# Patient Record
Sex: FEMALE | Race: WHITE | NOT HISPANIC OR LATINO | Employment: UNEMPLOYED | URBAN - METROPOLITAN AREA
[De-identification: names, ages, dates, MRNs, and addresses within clinical notes are randomized per-mention and may not be internally consistent; named-entity substitution may affect disease eponyms.]

---

## 2017-10-25 ENCOUNTER — TELEPHONE (OUTPATIENT)
Dept: FAMILY MEDICINE | Facility: CLINIC | Age: 62
End: 2017-10-25

## 2017-10-25 DIAGNOSIS — Z00.00 ROUTINE GENERAL MEDICAL EXAMINATION AT A HEALTH CARE FACILITY: Primary | ICD-10-CM

## 2017-10-25 NOTE — TELEPHONE ENCOUNTER
----- Message from Marya Torre sent at 10/25/2017  1:29 PM CDT -----  Contact: fyi  Doctor appointment and lab have been scheduled.  Please link lab orders to the lab appointment.  Date of doctor appointment:  12/16/18  Physical or EP:  epp  Date of lab appointment:  12/12/18  Comments:

## 2018-03-26 ENCOUNTER — TELEPHONE (OUTPATIENT)
Dept: FAMILY MEDICINE | Facility: CLINIC | Age: 63
End: 2018-03-26

## 2018-03-26 DIAGNOSIS — Z00.00 ROUTINE GENERAL MEDICAL EXAMINATION AT A HEALTH CARE FACILITY: Primary | ICD-10-CM

## 2018-03-26 NOTE — TELEPHONE ENCOUNTER
----- Message from Jinny Light sent at 3/26/2018  1:55 PM CDT -----  Contact: SELF/  Doctor appointment and lab have been scheduled.  Please link lab orders to the lab appointment.  Date of doctor appointment:  4/18/18  Physical or EP:  PHYS  Date of lab appointment:  4/11/18  Comments:

## 2018-04-11 ENCOUNTER — LAB VISIT (OUTPATIENT)
Dept: LAB | Facility: HOSPITAL | Age: 63
End: 2018-04-11
Payer: COMMERCIAL

## 2018-04-11 DIAGNOSIS — Z00.00 ROUTINE GENERAL MEDICAL EXAMINATION AT A HEALTH CARE FACILITY: ICD-10-CM

## 2018-04-11 LAB
ALBUMIN SERPL BCP-MCNC: 3.9 G/DL
ALP SERPL-CCNC: 80 U/L
ALT SERPL W/O P-5'-P-CCNC: 19 U/L
ANION GAP SERPL CALC-SCNC: 6 MMOL/L
AST SERPL-CCNC: 17 U/L
BASOPHILS # BLD AUTO: 0.06 K/UL
BASOPHILS NFR BLD: 0.9 %
BILIRUB SERPL-MCNC: 0.5 MG/DL
BUN SERPL-MCNC: 11 MG/DL
CALCIUM SERPL-MCNC: 9.1 MG/DL
CHLORIDE SERPL-SCNC: 106 MMOL/L
CHOLEST SERPL-MCNC: 197 MG/DL
CHOLEST/HDLC SERPL: 2.9 {RATIO}
CO2 SERPL-SCNC: 30 MMOL/L
CREAT SERPL-MCNC: 0.8 MG/DL
DIFFERENTIAL METHOD: NORMAL
EOSINOPHIL # BLD AUTO: 0.2 K/UL
EOSINOPHIL NFR BLD: 3 %
ERYTHROCYTE [DISTWIDTH] IN BLOOD BY AUTOMATED COUNT: 13.1 %
EST. GFR  (AFRICAN AMERICAN): >60 ML/MIN/1.73 M^2
EST. GFR  (NON AFRICAN AMERICAN): >60 ML/MIN/1.73 M^2
GLUCOSE SERPL-MCNC: 105 MG/DL
HCT VFR BLD AUTO: 42.7 %
HDLC SERPL-MCNC: 67 MG/DL
HDLC SERPL: 34 %
HGB BLD-MCNC: 13.9 G/DL
LDLC SERPL CALC-MCNC: 107 MG/DL
LYMPHOCYTES # BLD AUTO: 2.8 K/UL
LYMPHOCYTES NFR BLD: 40 %
MCH RBC QN AUTO: 28.1 PG
MCHC RBC AUTO-ENTMCNC: 32.6 G/DL
MCV RBC AUTO: 86 FL
MONOCYTES # BLD AUTO: 0.5 K/UL
MONOCYTES NFR BLD: 7.4 %
NEUTROPHILS # BLD AUTO: 3.4 K/UL
NEUTROPHILS NFR BLD: 48.6 %
NONHDLC SERPL-MCNC: 130 MG/DL
PLATELET # BLD AUTO: 288 K/UL
PMV BLD AUTO: 10.4 FL
POTASSIUM SERPL-SCNC: 4.5 MMOL/L
PROT SERPL-MCNC: 6.6 G/DL
RBC # BLD AUTO: 4.95 M/UL
SODIUM SERPL-SCNC: 142 MMOL/L
TRIGL SERPL-MCNC: 115 MG/DL
WBC # BLD AUTO: 6.92 K/UL

## 2018-04-11 PROCEDURE — 80053 COMPREHEN METABOLIC PANEL: CPT

## 2018-04-11 PROCEDURE — 85025 COMPLETE CBC W/AUTO DIFF WBC: CPT

## 2018-04-11 PROCEDURE — 80061 LIPID PANEL: CPT

## 2018-04-11 PROCEDURE — 36415 COLL VENOUS BLD VENIPUNCTURE: CPT

## 2018-04-18 ENCOUNTER — OFFICE VISIT (OUTPATIENT)
Dept: FAMILY MEDICINE | Facility: CLINIC | Age: 63
End: 2018-04-18
Payer: COMMERCIAL

## 2018-04-18 VITALS
TEMPERATURE: 98 F | WEIGHT: 142.63 LBS | RESPIRATION RATE: 16 BRPM | BODY MASS INDEX: 23.76 KG/M2 | DIASTOLIC BLOOD PRESSURE: 80 MMHG | SYSTOLIC BLOOD PRESSURE: 118 MMHG | HEIGHT: 65 IN

## 2018-04-18 DIAGNOSIS — N95.0 POSTMENOPAUSAL VAGINAL BLEEDING: ICD-10-CM

## 2018-04-18 DIAGNOSIS — L71.9 ROSACEA: ICD-10-CM

## 2018-04-18 DIAGNOSIS — B00.1 FEVER BLISTER: ICD-10-CM

## 2018-04-18 DIAGNOSIS — G89.29 CHRONIC LEFT SHOULDER PAIN: ICD-10-CM

## 2018-04-18 DIAGNOSIS — Z00.00 ROUTINE GENERAL MEDICAL EXAMINATION AT A HEALTH CARE FACILITY: Primary | ICD-10-CM

## 2018-04-18 DIAGNOSIS — M47.819 SERONEGATIVE SPONDYLOARTHROPATHY: ICD-10-CM

## 2018-04-18 DIAGNOSIS — M25.512 CHRONIC LEFT SHOULDER PAIN: ICD-10-CM

## 2018-04-18 PROCEDURE — 99999 PR PBB SHADOW E&M-EST. PATIENT-LVL III: CPT | Mod: PBBFAC,,, | Performed by: FAMILY MEDICINE

## 2018-04-18 PROCEDURE — 99396 PREV VISIT EST AGE 40-64: CPT | Mod: S$GLB,,, | Performed by: FAMILY MEDICINE

## 2018-04-18 RX ORDER — PROGESTERONE 100 MG/1
100 CAPSULE ORAL DAILY
Refills: 0 | COMMUNITY
Start: 2018-01-30 | End: 2019-10-30 | Stop reason: DRUGHIGH

## 2018-04-18 RX ORDER — VALACYCLOVIR HYDROCHLORIDE 1 G/1
TABLET, FILM COATED ORAL
Qty: 4 TABLET | Refills: 3 | Status: SHIPPED | OUTPATIENT
Start: 2018-04-18 | End: 2019-04-25 | Stop reason: SDUPTHER

## 2018-04-18 RX ORDER — FLUTICASONE PROPIONATE 50 MCG
SPRAY, SUSPENSION (ML) NASAL
Refills: 0 | COMMUNITY
Start: 2018-03-13 | End: 2022-04-05 | Stop reason: SINTOL

## 2018-04-18 RX ORDER — METRONIDAZOLE 7.5 MG/G
CREAM TOPICAL 2 TIMES DAILY
COMMUNITY
End: 2021-03-22

## 2018-04-18 NOTE — PATIENT INSTRUCTIONS
Follow with GYN    She follows with     RECOMMENDATIONS FOR FEMALES    Prevent the #1 cause of death- cardiovascular disease (HEART ATTACK & STROKE) by checking for normal blood pressure, cholesterol, sugars, & by not smoking.     VACCINES: Yearly FLU shot, ONE PNEUMONIA shot after 65,  SHINGLES shot after 60    Screening colonoscopy at AGE  50 & every 10 years to check for COLON CANCER,  one of the most common & preventable cancers. Or FIT z12.11, Repeat in 3 years if POLYP found     I recommend  high fiber (5 fresh fruits or vegetables daily), low fat diet and aerobic  exercise (huffing/ puffing/ sweating for 20 min straight at least 4 days a week)    Follow up yearly with fasting lipids, CMP, CBC, TSH prior.   ==============================================================

## 2018-04-18 NOTE — PROGRESS NOTES
Subjective:      Patient ID: Caitlyn Grace is a 62 y.o. female.    Chief Complaint: Annual Exam    Here today for general exam.     She had cholestectomy & appendectomy in  Dec 2017, feeling much better    She follows with nurse practitioner nutritionist Kadi Andersen on the [place], receives labs every 6 months treated with vitamins and supplements.  She states that she feels great    She gets occasional fever blisters and requesting refill of acyclovir    She was treated with Humira for ankylosing spondylitis in the past, but she feels well controlled with minimal lower back pain with treatment by chiropractor, nutritionist    Denies any chest pain, shortness of breath, nausea vomiting constipation diarrhea, blood in stool, heartburn    The 10-year ASCVD risk score (Ravindra BURROWS Jr., et al., 2013) is: 2.4%    Values used to calculate the score:      Age: 62 years      Sex: Female      Is Non- : No      Diabetic: No      Tobacco smoker: No      Systolic Blood Pressure: 118 mmHg      Is BP treated: No      HDL Cholesterol: 67 mg/dL      Total Cholesterol: 197 mg/dL    No results found for: HGBA1C  No results found for: MICALBCREAT  Lab Results   Component Value Date    LDLCALC 107.0 04/11/2018    LDLCALC 123.0 04/08/2014    CHOL 197 04/11/2018    HDL 67 04/11/2018    TRIG 115 04/11/2018       Lab Results   Component Value Date     04/11/2018    K 4.5 04/11/2018     04/11/2018    CO2 30 (H) 04/11/2018     04/11/2018    BUN 11 04/11/2018    CREATININE 0.8 04/11/2018    CALCIUM 9.1 04/11/2018    PROT 6.6 04/11/2018    ALBUMIN 3.9 04/11/2018    BILITOT 0.5 04/11/2018    ALKPHOS 80 04/11/2018    AST 17 04/11/2018    ALT 19 04/11/2018    ANIONGAP 6 (L) 04/11/2018    ESTGFRAFRICA >60 04/11/2018    EGFRNONAA >60 04/11/2018    WBC 6.92 04/11/2018    HGB 13.9 04/11/2018    HCT 42.7 04/11/2018    MCV 86 04/11/2018     04/11/2018    TSH 1.816 05/05/2016         Current  "Outpatient Prescriptions on File Prior to Visit   Medication Sig    acyclovir (ZOVIRAX) 800 MG Tab     MINIVELLE 0.05 mg/24 hr 1 patch twice a week.     Past Medical History:   Diagnosis Date    Abnormal ECG 2016: Anterior T wave inversion. Normal stress echo  Dr Grossman    Allergic rhinitis     ENT DR Meade, flonase bid    Chronic left shoulder pain 2018    Fever blister 2018    Gallbladder sludge 2014    [facility]    Gastric polyps     EGD  Dr Brian    Postmenopausal vaginal bleeding 2018    Endometrial biopsy & tx by GYN Marv    Rosacea 2018    Topical metronidazole    Seronegative spondyloarthropathy 2016    10/2015: Diagnosed.     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 She appears well-developed and well-nourished.   HENT:   Head: Normocephalic and atraumatic.   Right Ear: External ear normal.   Left Ear: External ear normal.   Nose: Nose normal.   Mouth/Throat: Oropharynx is clear and moist.   Eyes: EOM are normal. Pupils are equal, round, and reactive to light.   Neck: Neck supple. No " thyromegaly present.   Cardiovascular: Normal rate, regular rhythm, normal heart sounds and intact distal pulses.    No murmur heard.  Pulmonary/Chest: Effort normal and breath sounds normal. She has no wheezes.   Abdominal: Soft. Bowel sounds are normal. She exhibits no distension and no mass. There is no tenderness. There is no rebound and no guarding.   Musculoskeletal: She exhibits no edema.   Lymphadenopathy:     She has no cervical adenopathy.   Neurological: She is alert and oriented to person, place, and time.   Skin: Skin is warm and dry.   Psychiatric: She has a normal mood and affect. Her behavior is normal.     Assessment:     1. Routine general medical examination at a health care facility    2. Rosacea    3. Seronegative spondyloarthropathy    4. Chronic left shoulder pain    5. Fever blister    6. Postmenopausal vaginal bleeding      Plan:     Orders Placed This Encounter    valACYclovir (VALTREX) 1000 MG tablet       Patient Instructions   Follow with GYN    She follows with     RECOMMENDATIONS FOR FEMALES    Prevent the #1 cause of death- cardiovascular disease (HEART ATTACK & STROKE) by checking for normal blood pressure, cholesterol, sugars, & by not smoking.     VACCINES: Yearly FLU shot, ONE PNEUMONIA shot after 65,  SHINGLES shot after 60    Screening colonoscopy at AGE  50 & every 10 years to check for COLON CANCER,  one of the most common & preventable cancers. Or FIT z12.11, Repeat in 3 years if POLYP found     I recommend  high fiber (5 fresh fruits or vegetables daily), low fat diet and aerobic  exercise (huffing/ puffing/ sweating for 20 min straight at least 4 days a week)    Follow up yearly with fasting lipids, CMP, CBC, TSH prior.   ==============================================================                                  Answers for HPI/ROS submitted by the patient on 4/16/2018   activity change: No  unexpected weight change: No  neck pain: No  hearing loss: No  rhinorrhea:  No  trouble swallowing: No  eye discharge: No  visual disturbance: No  chest tightness: No  wheezing: No  chest pain: No  palpitations: No  blood in stool: No  constipation: No  vomiting: No  diarrhea: No  polydipsia: No  polyuria: No  difficulty urinating: No  hematuria: No  menstrual problem: No  dysuria: No  joint swelling: Yes  arthralgias: Yes  headaches: No  weakness: No  confusion: No  dysphoric mood: No

## 2019-01-22 ENCOUNTER — TELEPHONE (OUTPATIENT)
Dept: FAMILY MEDICINE | Facility: CLINIC | Age: 64
End: 2019-01-22

## 2019-01-22 DIAGNOSIS — Z00.00 ANNUAL PHYSICAL EXAM: Primary | ICD-10-CM

## 2019-01-22 NOTE — TELEPHONE ENCOUNTER
----- Message from Khushboo Pulliam sent at 1/22/2019 11:59 AM CST -----  Doctor appointment and lab have been scheduled.  Please link lab orders to the lab appointment.  Date of doctor appointment:  04/23/19  Physical or EP:  Physical  Date of lab appointment:  04/18/19  Comments:

## 2019-04-04 ENCOUNTER — PATIENT OUTREACH (OUTPATIENT)
Dept: ADMINISTRATIVE | Facility: HOSPITAL | Age: 64
End: 2019-04-04

## 2019-04-15 ENCOUNTER — LAB VISIT (OUTPATIENT)
Dept: LAB | Facility: HOSPITAL | Age: 64
End: 2019-04-15
Attending: FAMILY MEDICINE
Payer: COMMERCIAL

## 2019-04-15 DIAGNOSIS — Z00.00 ANNUAL PHYSICAL EXAM: ICD-10-CM

## 2019-04-15 LAB
ALBUMIN SERPL BCP-MCNC: 4.2 G/DL (ref 3.5–5.2)
ALP SERPL-CCNC: 72 U/L (ref 55–135)
ALT SERPL W/O P-5'-P-CCNC: 13 U/L (ref 10–44)
ANION GAP SERPL CALC-SCNC: 7 MMOL/L (ref 8–16)
AST SERPL-CCNC: 13 U/L (ref 10–40)
BASOPHILS # BLD AUTO: 0.06 K/UL (ref 0–0.2)
BASOPHILS NFR BLD: 0.9 % (ref 0–1.9)
BILIRUB SERPL-MCNC: 0.5 MG/DL (ref 0.1–1)
BUN SERPL-MCNC: 16 MG/DL (ref 8–23)
CALCIUM SERPL-MCNC: 9.9 MG/DL (ref 8.7–10.5)
CHLORIDE SERPL-SCNC: 103 MMOL/L (ref 95–110)
CHOLEST SERPL-MCNC: 208 MG/DL (ref 120–199)
CHOLEST/HDLC SERPL: 4.2 {RATIO} (ref 2–5)
CO2 SERPL-SCNC: 29 MMOL/L (ref 23–29)
CREAT SERPL-MCNC: 0.8 MG/DL (ref 0.5–1.4)
DIFFERENTIAL METHOD: NORMAL
EOSINOPHIL # BLD AUTO: 0.1 K/UL (ref 0–0.5)
EOSINOPHIL NFR BLD: 2.2 % (ref 0–8)
ERYTHROCYTE [DISTWIDTH] IN BLOOD BY AUTOMATED COUNT: 12.6 % (ref 11.5–14.5)
EST. GFR  (AFRICAN AMERICAN): >60 ML/MIN/1.73 M^2
EST. GFR  (NON AFRICAN AMERICAN): >60 ML/MIN/1.73 M^2
GLUCOSE SERPL-MCNC: 97 MG/DL (ref 70–110)
HCT VFR BLD AUTO: 43.7 % (ref 37–48.5)
HDLC SERPL-MCNC: 49 MG/DL (ref 40–75)
HDLC SERPL: 23.6 % (ref 20–50)
HGB BLD-MCNC: 14.2 G/DL (ref 12–16)
LDLC SERPL CALC-MCNC: 134.8 MG/DL (ref 63–159)
LYMPHOCYTES # BLD AUTO: 2.5 K/UL (ref 1–4.8)
LYMPHOCYTES NFR BLD: 38.3 % (ref 18–48)
MCH RBC QN AUTO: 28.4 PG (ref 27–31)
MCHC RBC AUTO-ENTMCNC: 32.5 G/DL (ref 32–36)
MCV RBC AUTO: 87 FL (ref 82–98)
MONOCYTES # BLD AUTO: 0.5 K/UL (ref 0.3–1)
MONOCYTES NFR BLD: 8.1 % (ref 4–15)
NEUTROPHILS # BLD AUTO: 3.2 K/UL (ref 1.8–7.7)
NEUTROPHILS NFR BLD: 50.3 % (ref 38–73)
NONHDLC SERPL-MCNC: 159 MG/DL
PLATELET # BLD AUTO: 293 K/UL (ref 150–350)
PMV BLD AUTO: 10.1 FL (ref 9.2–12.9)
POTASSIUM SERPL-SCNC: 4.4 MMOL/L (ref 3.5–5.1)
PROT SERPL-MCNC: 7.1 G/DL (ref 6–8.4)
RBC # BLD AUTO: 5 M/UL (ref 4–5.4)
SODIUM SERPL-SCNC: 139 MMOL/L (ref 136–145)
TRIGL SERPL-MCNC: 121 MG/DL (ref 30–150)
WBC # BLD AUTO: 6.42 K/UL (ref 3.9–12.7)

## 2019-04-15 PROCEDURE — 80061 LIPID PANEL: CPT

## 2019-04-15 PROCEDURE — 80053 COMPREHEN METABOLIC PANEL: CPT

## 2019-04-15 PROCEDURE — 36415 COLL VENOUS BLD VENIPUNCTURE: CPT

## 2019-04-15 PROCEDURE — 85025 COMPLETE CBC W/AUTO DIFF WBC: CPT

## 2019-04-25 ENCOUNTER — OFFICE VISIT (OUTPATIENT)
Dept: FAMILY MEDICINE | Facility: CLINIC | Age: 64
End: 2019-04-25
Payer: COMMERCIAL

## 2019-04-25 VITALS
DIASTOLIC BLOOD PRESSURE: 74 MMHG | SYSTOLIC BLOOD PRESSURE: 110 MMHG | WEIGHT: 140.44 LBS | HEIGHT: 64 IN | BODY MASS INDEX: 23.98 KG/M2 | HEART RATE: 69 BPM | TEMPERATURE: 98 F

## 2019-04-25 DIAGNOSIS — M47.819 SERONEGATIVE SPONDYLOARTHROPATHY: ICD-10-CM

## 2019-04-25 DIAGNOSIS — J30.1 SEASONAL ALLERGIC RHINITIS DUE TO POLLEN: ICD-10-CM

## 2019-04-25 DIAGNOSIS — N95.0 POSTMENOPAUSAL VAGINAL BLEEDING: ICD-10-CM

## 2019-04-25 DIAGNOSIS — Z00.00 ANNUAL PHYSICAL EXAM: Primary | ICD-10-CM

## 2019-04-25 DIAGNOSIS — M21.611 BUNION OF RIGHT FOOT: ICD-10-CM

## 2019-04-25 DIAGNOSIS — M54.9 MID BACK PAIN: ICD-10-CM

## 2019-04-25 DIAGNOSIS — B00.1 FEVER BLISTER: ICD-10-CM

## 2019-04-25 PROCEDURE — 99396 PR PREVENTIVE VISIT,EST,40-64: ICD-10-PCS | Mod: S$GLB,,, | Performed by: FAMILY MEDICINE

## 2019-04-25 PROCEDURE — 99999 PR PBB SHADOW E&M-EST. PATIENT-LVL III: CPT | Mod: PBBFAC,,, | Performed by: FAMILY MEDICINE

## 2019-04-25 PROCEDURE — 99999 PR PBB SHADOW E&M-EST. PATIENT-LVL III: ICD-10-PCS | Mod: PBBFAC,,, | Performed by: FAMILY MEDICINE

## 2019-04-25 PROCEDURE — 99396 PREV VISIT EST AGE 40-64: CPT | Mod: S$GLB,,, | Performed by: FAMILY MEDICINE

## 2019-04-25 RX ORDER — AZELASTINE 1 MG/ML
1 SPRAY, METERED NASAL 2 TIMES DAILY
Qty: 30 ML | Refills: 12 | Status: SHIPPED | OUTPATIENT
Start: 2019-04-25 | End: 2020-04-27 | Stop reason: SDUPTHER

## 2019-04-25 RX ORDER — VALACYCLOVIR HYDROCHLORIDE 1 G/1
TABLET, FILM COATED ORAL
Qty: 12 TABLET | Refills: 3 | Status: SHIPPED | OUTPATIENT
Start: 2019-04-25 | End: 2022-04-05 | Stop reason: SDUPTHER

## 2019-04-25 NOTE — PATIENT INSTRUCTIONS
Nasal sprays - flonase in am, astelin in pm    Can add Claritin nightly (can make you tired)    If still with symptoms, let me know & I can send Singulair to try    ==================  You can call the [facility], PHYSICAL THERAPY center for an appointment    [address] - not far from the [address] [place] [address], right past [facility]  [phone]    They will help diagnose the cause of your FOOT  problem.     Let me know if your symptoms persist  ==================    Follow with GYN for female health & cancer prevention    ==============================  RECOMMENDATIONS FOR FEMALES  ==============================  Your #1 MEDICINE is DAILY EXERCISE - 15-20 minutes of huffing & puffing EVERY DAY.     Prevent the #1 cause of death- cardiovascular disease (HEART ATTACK & STROKE) by checking for normal blood pressure, cholesterol, sugars, & by not smoking.     VACCINES: Yearly FLU shot, PNEUMONIA shot after 65,  SHINGLES shot after 50    Screening colonoscopy at AGE  50 & every 10 years to check for COLON CANCER,  one of the most common & preventable cancers (Or FIT kit yearly) Repeat in 3 years if POLYP found     I recommend  high fiber (5 fresh fruits or vegetables daily), low fat diet and aerobic  exercise (huffing/ puffing/ sweating for 20 min straight at least 4 days a week)    Follow up yearly with mammogram, fasting lipids, CMP, CBC prior.   ==============================================================

## 2019-04-25 NOTE — PROGRESS NOTES
Subjective:      Patient ID: Caitlyn Grace is a 63 y.o. female.    Chief Complaint: Annual Exam; Medication Refill (valtrex); and Back Pain    Here today for general exam.     She uses Valtrex prn fever blister & is requesting refills.     She has history of seronegative spondyloarthropathy & has flare ups of back pain. About a month ago she had significant left-sided mid back pain. Never had pain like this before.  It reoccurred a few times since but has resolved in the past 2 weeks.  She did not get evaluated    .  No history of kidney stone, no blood in urine she has a long history of sinusitis, allergies.  Recently with more sinus postnasal drip, sore throat, ears crackling, occasional ringing in ears.  She sometimes feels like she is hearing under water.  She saw ENT doctor hip come in the past to give steroid injection and antibiotics, but she was to avoid this.  She states that audiologic testing showed hearing loss, fluid in the ear.  He treated with Flonase once daily, uses nasal saline.  She does not like to take antihistamine regularly.  Years ago she saw allergist and said you're allergic to everything.  Never prescribed Astelin or Singulair before    Her right foot has pain at throat great toe, mild swelling, better with specific Fit Flops, but it's more tender recently.     She follows with GYN for postmenopausal hormone therapy.     She follows with nurse practitioner nutritionist Kadi Andersen on the [facility] labs every 6 months treated with vitamins  D, iron, hormones , thyroid and supplements.    Denies any chest pain, shortness of breath, nausea vomiting constipation diarrhea, blood in stool, heartburn      Current Outpatient Medications:     fluticasone (FLONASE) 50 mcg/actuation nasal spray, , Disp: , Rfl: 0    MINIVELLE 0.05 mg/24 hr, 1 patch twice a week., Disp: , Rfl: 0    NATURE-THROID 65 mg Tab, Take 65 mg by mouth once daily., Disp: , Rfl: 1    progesterone (PROMETRIUM) 100  MG capsule, Take 100 mg by mouth once daily. , Disp: , Rfl: 0    azelastine (ASTELIN) 137 mcg (0.1 %) nasal spray, 1 spray (137 mcg total) by Nasal route 2 (two) times daily., Disp: 30 mL, Rfl: 12    metronidazole 0.75% (METROCREAM) 0.75 % Crea, Apply topically 2 (two) times daily., Disp: , Rfl:     valACYclovir (VALTREX) 1000 MG tablet, 1 po bid x 2 d at onset of fever blister, Disp: 12 tablet, Rfl: 3    No results found for: HGBA1C  No results found for: MICALBCREAT  Lab Results   Component Value Date    LDLCALC 134.8 04/15/2019    LDLCALC 107.0 04/11/2018    CHOL 208 (H) 04/15/2019    HDL 49 04/15/2019    TRIG 121 04/15/2019       Lab Results   Component Value Date     04/15/2019    K 4.4 04/15/2019     04/15/2019    CO2 29 04/15/2019    GLU 97 04/15/2019    BUN 16 04/15/2019    CREATININE 0.8 04/15/2019    CALCIUM 9.9 04/15/2019    PROT 7.1 04/15/2019    ALBUMIN 4.2 04/15/2019    BILITOT 0.5 04/15/2019    ALKPHOS 72 04/15/2019    AST 13 04/15/2019    ALT 13 04/15/2019    ANIONGAP 7 (L) 04/15/2019    ESTGFRAFRICA >60 04/15/2019    EGFRNONAA >60 04/15/2019    WBC 6.42 04/15/2019    HGB 14.2 04/15/2019    HGB 13.9 04/11/2018    HCT 43.7 04/15/2019    MCV 87 04/15/2019     04/15/2019    TSH 1.816 05/05/2016    HEPCAB Negative 05/05/2016       Past Medical History:   Diagnosis Date    Abnormal ECG 05/02/2016 5/2/2016: Anterior T wave inversion. Normal stress echo 2016 Dr Grossman    Allergic rhinitis     ENT DR Meade, flonase bid    Bunion of right foot 4/25/2019    MTP    Chronic left shoulder pain 4/18/2018    Fever blister 4/18/2018    Gallbladder sludge April 2014    general surgeon Dr Maxx Paige at [facility]    Gastric polyps     EGD 2015 Dr Bulat    Postmenopausal vaginal bleeding 4/18/2018    Endometrial biopsy & tx by GYN Marv Smith 4/18/2018    Topical metronidazole    Seasonal allergic rhinitis due to pollen 4/25/2019    flonase daily, allergy testing in past,  "GEORGE Esposito    Seronegative spondyloarthropathy 2016    10/2015: Diagnosed.     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,  , exercises on glider, 5 children, ETOH socially, colonoscopy with diverticulosis per pt , GYN Marv     Family History   Problem Relation Age of Onset    Cancer Father         pancreatic,  81    Diabetes Father     Heart failure Father     Cancer Mother 80        CLL    Alzheimer's disease Mother 83    Atrial fibrillation Mother     Hypertension Mother     Fibromyalgia Daughter      Vitals:    19 1259   BP: 110/74   Pulse: 69   Temp: 98.2 °F (36.8 °C)   TempSrc: Oral   Weight: 63.7 kg (140 lb 6.9 oz)   Height: 5' 4" (1.626 m)   PainSc: 0-No pain     Objective:   Physical Exam   Constitutional: She is oriented to person, place, and time. She appears well-developed and well-nourished.   HENT:   Head: Normocephalic and atraumatic.   Right Ear: External ear normal. Tympanic membrane is retracted.   Left Ear: External ear normal. Tympanic membrane is retracted.   Nose: Nose normal.   Mouth/Throat: Oropharynx is clear and moist.   Eyes: Pupils are equal, round, and reactive to light. EOM are normal.   Neck: Neck supple. No thyromegaly present.   Cardiovascular: Normal rate, regular rhythm, normal heart sounds and intact distal pulses.   No murmur heard.  Pulmonary/Chest: Effort normal and breath sounds normal. She has no wheezes.   Abdominal: Soft. Bowel sounds are normal. She exhibits no distension and no mass. There is no tenderness. There is no rebound and no guarding.   Musculoskeletal: She exhibits no edema.   r foot MTP join mildly swollen, mild erythema, no warmth, full flexion, normal gait   Lymphadenopathy:     She " has no cervical adenopathy.   Neurological: She is alert and oriented to person, place, and time.   Skin: Skin is warm and dry.   Psychiatric: She has a normal mood and affect. Her behavior is normal.     Assessment:     1. Annual physical exam    2. Fever blister    3. Seronegative spondyloarthropathy    4. Postmenopausal vaginal bleeding    5. Seasonal allergic rhinitis due to pollen    6. Mid back pain    7. Bunion of right foot      Plan:     Orders Placed This Encounter    azelastine (ASTELIN) 137 mcg (0.1 %) nasal spray    valACYclovir (VALTREX) 1000 MG tablet     Let me know if back pain recurs and consider checking for kidney stone, evaluate if this is muscle strain or #3    Patient Instructions   Nasal sprays - flonase in am, astelin in pm    Can add Claritin nightly (can make you tired)    If still with symptoms, let me know & I can send Singulair to try    ==================  You can call the [facility], PHYSICAL THERAPY center for an appointment    [address] [facility] - not far from the [address] [place] [address], right past [facility]  [phone]    They will help diagnose the cause of your FOOT  problem.     Let me know if your symptoms persist  ==================    Follow with GYN for female health & cancer prevention    ==============================  RECOMMENDATIONS FOR FEMALES  ==============================  Your #1 MEDICINE is DAILY EXERCISE - 15-20 minutes of huffing & puffing EVERY DAY.     Prevent the #1 cause of death- cardiovascular disease (HEART ATTACK & STROKE) by checking for normal blood pressure, cholesterol, sugars, & by not smoking.     VACCINES: Yearly FLU shot, PNEUMONIA shot after 65,  SHINGLES shot after 50    Screening colonoscopy at AGE  50 & every 10 years to check for COLON CANCER,  one of the most common & preventable cancers (Or FIT kit yearly) Repeat in 3 years if POLYP found     I recommend  high fiber (5 fresh fruits or vegetables daily), low fat diet and  aerobic  exercise (huffing/ puffing/ sweating for 20 min straight at least 4 days a week)    Follow up yearly with mammogram, fasting lipids, CMP, CBC prior.   ==============================================================                                  Answers for HPI/ROS submitted by the patient on 4/25/2019   activity change: No  unexpected weight change: No  neck pain: Yes  hearing loss: Yes  rhinorrhea: Yes  trouble swallowing: No  eye discharge: No  visual disturbance: No  chest tightness: No  wheezing: No  chest pain: No  palpitations: No  blood in stool: No  constipation: No  vomiting: No  diarrhea: No  polydipsia: No  polyuria: No  difficulty urinating: No  hematuria: No  menstrual problem: No  dysuria: No  joint swelling: No  arthralgias: Yes  headaches: No  weakness: No  confusion: Yes  dysphoric mood: No

## 2019-10-30 ENCOUNTER — LAB VISIT (OUTPATIENT)
Dept: LAB | Facility: HOSPITAL | Age: 64
End: 2019-10-30
Attending: FAMILY MEDICINE
Payer: COMMERCIAL

## 2019-10-30 ENCOUNTER — OFFICE VISIT (OUTPATIENT)
Dept: PRIMARY CARE CLINIC | Facility: CLINIC | Age: 64
End: 2019-10-30
Payer: COMMERCIAL

## 2019-10-30 DIAGNOSIS — Z01.810 PREOP CARDIOVASCULAR EXAM: ICD-10-CM

## 2019-10-30 DIAGNOSIS — Z01.810 PREOP CARDIOVASCULAR EXAM: Primary | ICD-10-CM

## 2019-10-30 DIAGNOSIS — M21.611 BUNION OF GREAT TOE OF RIGHT FOOT: ICD-10-CM

## 2019-10-30 LAB
ALBUMIN SERPL BCP-MCNC: 4.2 G/DL (ref 3.5–5.2)
ALP SERPL-CCNC: 84 U/L (ref 55–135)
ALT SERPL W/O P-5'-P-CCNC: 16 U/L (ref 10–44)
ANION GAP SERPL CALC-SCNC: 8 MMOL/L (ref 8–16)
AST SERPL-CCNC: 16 U/L (ref 10–40)
BASOPHILS # BLD AUTO: 0.05 K/UL (ref 0–0.2)
BASOPHILS NFR BLD: 0.7 % (ref 0–1.9)
BILIRUB SERPL-MCNC: 0.6 MG/DL (ref 0.1–1)
BUN SERPL-MCNC: 13 MG/DL (ref 8–23)
CALCIUM SERPL-MCNC: 9.1 MG/DL (ref 8.7–10.5)
CHLORIDE SERPL-SCNC: 102 MMOL/L (ref 95–110)
CO2 SERPL-SCNC: 29 MMOL/L (ref 23–29)
CREAT SERPL-MCNC: 0.8 MG/DL (ref 0.5–1.4)
DIFFERENTIAL METHOD: ABNORMAL
EOSINOPHIL # BLD AUTO: 0.1 K/UL (ref 0–0.5)
EOSINOPHIL NFR BLD: 1.3 % (ref 0–8)
ERYTHROCYTE [DISTWIDTH] IN BLOOD BY AUTOMATED COUNT: 13.1 % (ref 11.5–14.5)
EST. GFR  (AFRICAN AMERICAN): >60 ML/MIN/1.73 M^2
EST. GFR  (NON AFRICAN AMERICAN): >60 ML/MIN/1.73 M^2
GLUCOSE SERPL-MCNC: 101 MG/DL (ref 70–110)
HCT VFR BLD AUTO: 43.4 % (ref 37–48.5)
HGB BLD-MCNC: 13.4 G/DL (ref 12–16)
IMM GRANULOCYTES # BLD AUTO: 0.01 K/UL (ref 0–0.04)
IMM GRANULOCYTES NFR BLD AUTO: 0.1 % (ref 0–0.5)
LYMPHOCYTES # BLD AUTO: 1.7 K/UL (ref 1–4.8)
LYMPHOCYTES NFR BLD: 25.1 % (ref 18–48)
MCH RBC QN AUTO: 28 PG (ref 27–31)
MCHC RBC AUTO-ENTMCNC: 30.9 G/DL (ref 32–36)
MCV RBC AUTO: 91 FL (ref 82–98)
MONOCYTES # BLD AUTO: 0.5 K/UL (ref 0.3–1)
MONOCYTES NFR BLD: 7.5 % (ref 4–15)
NEUTROPHILS # BLD AUTO: 4.5 K/UL (ref 1.8–7.7)
NEUTROPHILS NFR BLD: 65.3 % (ref 38–73)
NRBC BLD-RTO: 0 /100 WBC
PLATELET # BLD AUTO: 298 K/UL (ref 150–350)
PMV BLD AUTO: 10.7 FL (ref 9.2–12.9)
POTASSIUM SERPL-SCNC: 4 MMOL/L (ref 3.5–5.1)
PROT SERPL-MCNC: 7 G/DL (ref 6–8.4)
RBC # BLD AUTO: 4.79 M/UL (ref 4–5.4)
SODIUM SERPL-SCNC: 139 MMOL/L (ref 136–145)
WBC # BLD AUTO: 6.82 K/UL (ref 3.9–12.7)

## 2019-10-30 PROCEDURE — 93005 ELECTROCARDIOGRAM TRACING: CPT | Mod: S$GLB,,, | Performed by: FAMILY MEDICINE

## 2019-10-30 PROCEDURE — 36415 COLL VENOUS BLD VENIPUNCTURE: CPT | Mod: PN

## 2019-10-30 PROCEDURE — 93005 EKG 12-LEAD: ICD-10-PCS | Mod: S$GLB,,, | Performed by: FAMILY MEDICINE

## 2019-10-30 PROCEDURE — 93010 ELECTROCARDIOGRAM REPORT: CPT | Mod: S$GLB,,, | Performed by: INTERNAL MEDICINE

## 2019-10-30 PROCEDURE — 85025 COMPLETE CBC W/AUTO DIFF WBC: CPT

## 2019-10-30 PROCEDURE — 99999 PR PBB SHADOW E&M-EST. PATIENT-LVL III: CPT | Mod: PBBFAC,,, | Performed by: FAMILY MEDICINE

## 2019-10-30 PROCEDURE — 99999 PR PBB SHADOW E&M-EST. PATIENT-LVL III: ICD-10-PCS | Mod: PBBFAC,,, | Performed by: FAMILY MEDICINE

## 2019-10-30 PROCEDURE — 93010 EKG 12-LEAD: ICD-10-PCS | Mod: S$GLB,,, | Performed by: INTERNAL MEDICINE

## 2019-10-30 PROCEDURE — 80053 COMPREHEN METABOLIC PANEL: CPT

## 2019-10-30 PROCEDURE — 99214 OFFICE O/P EST MOD 30 MIN: CPT | Mod: S$GLB,,, | Performed by: FAMILY MEDICINE

## 2019-10-30 PROCEDURE — 99214 PR OFFICE/OUTPT VISIT, EST, LEVL IV, 30-39 MIN: ICD-10-PCS | Mod: S$GLB,,, | Performed by: FAMILY MEDICINE

## 2019-10-30 RX ORDER — PROGESTERONE 200 MG/1
100 CAPSULE ORAL DAILY
Refills: 1 | Status: ON HOLD | COMMUNITY
Start: 2019-08-21 | End: 2024-02-28 | Stop reason: HOSPADM

## 2019-10-30 NOTE — PROGRESS NOTES
Subjective:      Patient ID: Caitlyn Grace is a 63 y.o. female.    Chief Complaint: Pre-op Exam (right foot, big toe)    Here today for surgery R great toe, bunion with her Podiatrist . Can't exercise due to pain.  She denies any problems with anesthesia in the past, although Percocet did make her nauseated.  She has a high pain threshold.  For several years she has had pain and swelling of the right great toe metatarsophalangeal joint, there is some arthritis, bunion.  She was able to tolerate wearing certain flip flops, but now it's too painful & is ready for surgical intervention.       Current Outpatient Medications:     azelastine (ASTELIN) 137 mcg (0.1 %) nasal spray, 1 spray (137 mcg total) by Nasal route 2 (two) times daily., Disp: 30 mL, Rfl: 12    fluticasone (FLONASE) 50 mcg/actuation nasal spray, , Disp: , Rfl: 0    metronidazole 0.75% (METROCREAM) 0.75 % Crea, Apply topically 2 (two) times daily., Disp: , Rfl:     MINIVELLE 0.05 mg/24 hr, 1 patch twice a week., Disp: , Rfl: 0    NATURE-THROID 97.5 mg Tab, Take 97.5 mg by mouth once daily., Disp: , Rfl: 1    progesterone (PROMETRIUM) 200 MG capsule, Take 200 mg by mouth once daily., Disp: , Rfl: 1    valACYclovir (VALTREX) 1000 MG tablet, 1 po bid x 2 d at onset of fever blister, Disp: 12 tablet, Rfl: 3    No results found for: HGBA1C  No results found for: MICALBCREAT  Lab Results   Component Value Date    LDLCALC 134.8 04/15/2019    LDLCALC 107.0 04/11/2018    CHOL 208 (H) 04/15/2019    HDL 49 04/15/2019    TRIG 121 04/15/2019       Lab Results   Component Value Date     10/30/2019    K 4.0 10/30/2019     10/30/2019    CO2 29 10/30/2019     10/30/2019    BUN 13 10/30/2019    CREATININE 0.8 10/30/2019    CALCIUM 9.1 10/30/2019    PROT 7.0 10/30/2019    ALBUMIN 4.2 10/30/2019    BILITOT 0.6 10/30/2019    ALKPHOS 84 10/30/2019    AST 16 10/30/2019    ALT 16 10/30/2019    ANIONGAP 8 10/30/2019    ESTGFRAFRICA >60.0  "10/30/2019    EGFRNONAA >60.0 10/30/2019    WBC 6.82 10/30/2019    HGB 13.4 10/30/2019    HGB 14.2 04/15/2019    HCT 43.4 10/30/2019    MCV 91 10/30/2019     10/30/2019    TSH 1.816 2016    HEPCAB Negative 2016       Lab Results   Component Value Date    FSH 27 05/15/2008    ESTRADIOL 111 2008         Past Medical History:   Diagnosis Date    Abnormal ECG 2016: Anterior T wave inversion. Normal stress echo  Dr Grossman    Allergic rhinitis     ENT DR Meade, flonase bid    Bunion of right foot 2019    MTP    Chronic left shoulder pain 2018    Fever blister 2018    Gallbladder sludge 2014    [facility]x [facility]    Gastric polyps     EGD  Dr Brian    Postmenopausal vaginal bleeding 2018    Endometrial biopsy & tx by GYN Marv Smith 2018    Topical metronidazole    Seasonal allergic rhinitis due to pollen 2019    flonase daily, allergy testing in past, ENT Vaibhav    Seronegative spondyloarthropathy 2016    10/2015: Diagnosed.     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,  , exercises on glider, 5 children, ETOH socially, colonoscopy with diverticulosis per pt , GYN Marv     Family History   Problem Relation Age of Onset    Cancer Father         pancreatic,  81    Diabetes Father     Heart failure Father     Cancer Mother 80        CLL    Alzheimer's disease Mother 83    Atrial fibrillation Mother     Hypertension Mother     Fibromyalgia Daughter      Vitals:    10/30/19 1021   BP: (P) 105/70   Pulse: (P) 60   Temp: (P) 98 °F (36.7 °C)   Weight: (P) 62.7 kg (138 lb 3.7 oz)   Height: (P) 5' 4" (1.626 m)     Objective: "   Physical Exam   Constitutional: She is oriented to person, place, and time. She appears well-developed and well-nourished.   HENT:   Head: Normocephalic and atraumatic.   Right Ear: External ear normal.   Left Ear: External ear normal.   Nose: Nose normal.   Mouth/Throat: Oropharynx is clear and moist.   Eyes: Pupils are equal, round, and reactive to light. EOM are normal.   Neck: Neck supple. No thyromegaly present.   Cardiovascular: Normal rate, regular rhythm, normal heart sounds and intact distal pulses.   No murmur heard.  Pulmonary/Chest: Effort normal and breath sounds normal. She has no wheezes.   Abdominal: Soft. Bowel sounds are normal. She exhibits no distension and no mass. There is no tenderness. There is no rebound and no guarding.   Musculoskeletal: She exhibits no edema.   Swollen enlarged R 1st MTP joint, tender to palpation,  2 + pulses, Less than 2 second capillary refill     Lymphadenopathy:     She has no cervical adenopathy.   Neurological: She is alert and oriented to person, place, and time.   Skin: Skin is warm and dry.   Psychiatric: She has a normal mood and affect. Her behavior is normal.        EKG - NSR  Assessment:     1. Preop cardiovascular exam    2. Bunion of great toe of right foot      Plan:     Orders Placed This Encounter    CBC auto differential    Comprehensive metabolic panel    EKG 12-lead     ---------------------------  EKG - NSR    CBC BMP - normal    STOP ALL NSAIDS ( like Advil (Ibuprofen)  , Alleve (naproxen) , Mobic (meloxicam), Celebrex, Aspirin, Plavix, Omega Fatty Acids/ Fish Oil )  One week prior to surgery so you don't bleed excessively    She is cardiovascularly cleared for bunion surgery    There are no Patient Instructions on file for this visit.

## 2019-10-31 ENCOUNTER — TELEPHONE (OUTPATIENT)
Dept: PRIMARY CARE CLINIC | Facility: CLINIC | Age: 64
End: 2019-10-31

## 2019-10-31 NOTE — TELEPHONE ENCOUNTER
Preop clearance and supporting documentation faxed to [facility] [phone].  Fax confirm rec'd.  Pt advised.

## 2019-12-20 DIAGNOSIS — Z12.39 BREAST CANCER SCREENING: ICD-10-CM

## 2020-04-27 RX ORDER — AZELASTINE 1 MG/ML
1 SPRAY, METERED NASAL 2 TIMES DAILY
Qty: 30 ML | Refills: 2 | Status: SHIPPED | OUTPATIENT
Start: 2020-04-27 | End: 2022-04-05 | Stop reason: SINTOL

## 2020-10-09 ENCOUNTER — PATIENT MESSAGE (OUTPATIENT)
Dept: ADMINISTRATIVE | Facility: HOSPITAL | Age: 65
End: 2020-10-09

## 2020-12-23 ENCOUNTER — PATIENT OUTREACH (OUTPATIENT)
Dept: ADMINISTRATIVE | Facility: HOSPITAL | Age: 65
End: 2020-12-23

## 2021-01-04 ENCOUNTER — PATIENT MESSAGE (OUTPATIENT)
Dept: ADMINISTRATIVE | Facility: HOSPITAL | Age: 66
End: 2021-01-04

## 2021-01-06 ENCOUNTER — TELEPHONE (OUTPATIENT)
Dept: PRIMARY CARE CLINIC | Facility: CLINIC | Age: 66
End: 2021-01-06

## 2021-03-12 ENCOUNTER — HOSPITAL ENCOUNTER (OUTPATIENT)
Dept: RADIOLOGY | Facility: HOSPITAL | Age: 66
Discharge: HOME | End: 2021-03-12
Attending: INTERNAL MEDICINE
Payer: COMMERCIAL

## 2021-03-12 ENCOUNTER — OFFICE VISIT (OUTPATIENT)
Dept: PRIMARY CARE CLINIC | Facility: CLINIC | Age: 66
End: 2021-03-12
Payer: COMMERCIAL

## 2021-03-12 VITALS
OXYGEN SATURATION: 98 % | SYSTOLIC BLOOD PRESSURE: 140 MMHG | DIASTOLIC BLOOD PRESSURE: 92 MMHG | TEMPERATURE: 98 F | HEART RATE: 91 BPM | WEIGHT: 142.63 LBS | HEIGHT: 64 IN | BODY MASS INDEX: 24.35 KG/M2

## 2021-03-12 DIAGNOSIS — R07.9 CHEST PAIN, UNSPECIFIED TYPE: Primary | ICD-10-CM

## 2021-03-12 DIAGNOSIS — R03.0 ELEVATED BLOOD-PRESSURE READING WITHOUT DIAGNOSIS OF HYPERTENSION: ICD-10-CM

## 2021-03-12 DIAGNOSIS — M54.6 ACUTE MIDLINE THORACIC BACK PAIN: ICD-10-CM

## 2021-03-12 DIAGNOSIS — R52 GENERALIZED BODY ACHES: ICD-10-CM

## 2021-03-12 DIAGNOSIS — R07.9 CHEST PAIN, UNSPECIFIED TYPE: ICD-10-CM

## 2021-03-12 DIAGNOSIS — Z86.16 HISTORY OF COVID-19: ICD-10-CM

## 2021-03-12 LAB
BILIRUB UR QL STRIP: NEGATIVE
CLARITY UR REFRACT.AUTO: CLEAR
COLOR UR AUTO: NORMAL
GLUCOSE UR QL STRIP: NEGATIVE
HGB UR QL STRIP: NEGATIVE
KETONES UR QL STRIP: NEGATIVE
LEUKOCYTE ESTERASE UR QL STRIP: NEGATIVE
NITRITE UR QL STRIP: NEGATIVE
PH UR STRIP: 6 [PH] (ref 5–8)
PROT UR QL STRIP: NEGATIVE
SP GR UR STRIP: 1 (ref 1–1.03)
URN SPEC COLLECT METH UR: NORMAL

## 2021-03-12 PROCEDURE — 99214 OFFICE O/P EST MOD 30 MIN: CPT | Mod: S$GLB,,, | Performed by: INTERNAL MEDICINE

## 2021-03-12 PROCEDURE — 71046 X-RAY EXAM CHEST 2 VIEWS: CPT | Mod: 26,,, | Performed by: RADIOLOGY

## 2021-03-12 PROCEDURE — 71046 XR CHEST PA AND LATERAL: ICD-10-PCS | Mod: 26,,, | Performed by: RADIOLOGY

## 2021-03-12 PROCEDURE — 93005 EKG 12-LEAD: ICD-10-PCS | Mod: S$GLB,,, | Performed by: INTERNAL MEDICINE

## 2021-03-12 PROCEDURE — 1125F PR PAIN SEVERITY QUANTIFIED, PAIN PRESENT: ICD-10-PCS | Mod: S$GLB,,, | Performed by: INTERNAL MEDICINE

## 2021-03-12 PROCEDURE — 99999 PR PBB SHADOW E&M-EST. PATIENT-LVL IV: CPT | Mod: PBBFAC,,, | Performed by: INTERNAL MEDICINE

## 2021-03-12 PROCEDURE — 72070 XR THORACIC SPINE AP LATERAL: ICD-10-PCS | Mod: 26,,, | Performed by: RADIOLOGY

## 2021-03-12 PROCEDURE — 93010 ELECTROCARDIOGRAM REPORT: CPT | Mod: S$GLB,,, | Performed by: INTERNAL MEDICINE

## 2021-03-12 PROCEDURE — 3008F BODY MASS INDEX DOCD: CPT | Mod: CPTII,S$GLB,, | Performed by: INTERNAL MEDICINE

## 2021-03-12 PROCEDURE — 93010 EKG 12-LEAD: ICD-10-PCS | Mod: S$GLB,,, | Performed by: INTERNAL MEDICINE

## 2021-03-12 PROCEDURE — 93005 ELECTROCARDIOGRAM TRACING: CPT | Mod: S$GLB,,, | Performed by: INTERNAL MEDICINE

## 2021-03-12 PROCEDURE — 71046 X-RAY EXAM CHEST 2 VIEWS: CPT | Mod: TC,PN

## 2021-03-12 PROCEDURE — 99999 PR PBB SHADOW E&M-EST. PATIENT-LVL IV: ICD-10-PCS | Mod: PBBFAC,,, | Performed by: INTERNAL MEDICINE

## 2021-03-12 PROCEDURE — 72070 X-RAY EXAM THORAC SPINE 2VWS: CPT | Mod: 26,,, | Performed by: RADIOLOGY

## 2021-03-12 PROCEDURE — 99214 PR OFFICE/OUTPT VISIT, EST, LEVL IV, 30-39 MIN: ICD-10-PCS | Mod: S$GLB,,, | Performed by: INTERNAL MEDICINE

## 2021-03-12 PROCEDURE — 81003 URINALYSIS AUTO W/O SCOPE: CPT | Performed by: INTERNAL MEDICINE

## 2021-03-12 PROCEDURE — 3008F PR BODY MASS INDEX (BMI) DOCUMENTED: ICD-10-PCS | Mod: CPTII,S$GLB,, | Performed by: INTERNAL MEDICINE

## 2021-03-12 PROCEDURE — 72070 X-RAY EXAM THORAC SPINE 2VWS: CPT | Mod: TC,PN

## 2021-03-12 PROCEDURE — 1125F AMNT PAIN NOTED PAIN PRSNT: CPT | Mod: S$GLB,,, | Performed by: INTERNAL MEDICINE

## 2021-03-17 ENCOUNTER — TELEPHONE (OUTPATIENT)
Dept: PRIMARY CARE CLINIC | Facility: CLINIC | Age: 66
End: 2021-03-17

## 2021-03-17 DIAGNOSIS — U07.1 COVID-19 VIRUS INFECTION: ICD-10-CM

## 2021-03-17 DIAGNOSIS — R07.9 CHEST PAIN, UNSPECIFIED TYPE: Primary | ICD-10-CM

## 2021-03-17 DIAGNOSIS — R94.31 ABNORMAL EKG: ICD-10-CM

## 2021-03-22 ENCOUNTER — OFFICE VISIT (OUTPATIENT)
Dept: CARDIOLOGY | Facility: CLINIC | Age: 66
End: 2021-03-22
Payer: COMMERCIAL

## 2021-03-22 VITALS
SYSTOLIC BLOOD PRESSURE: 130 MMHG | BODY MASS INDEX: 24.31 KG/M2 | DIASTOLIC BLOOD PRESSURE: 86 MMHG | HEART RATE: 76 BPM | OXYGEN SATURATION: 97 % | WEIGHT: 141.63 LBS

## 2021-03-22 DIAGNOSIS — R94.31 ABNORMAL EKG: ICD-10-CM

## 2021-03-22 DIAGNOSIS — R07.9 CHEST PAIN, UNSPECIFIED TYPE: ICD-10-CM

## 2021-03-22 DIAGNOSIS — U07.1 COVID-19 VIRUS INFECTION: ICD-10-CM

## 2021-03-22 PROCEDURE — 3008F BODY MASS INDEX DOCD: CPT | Mod: CPTII,S$GLB,, | Performed by: INTERNAL MEDICINE

## 2021-03-22 PROCEDURE — 99203 PR OFFICE/OUTPT VISIT, NEW, LEVL III, 30-44 MIN: ICD-10-PCS | Mod: S$GLB,,, | Performed by: INTERNAL MEDICINE

## 2021-03-22 PROCEDURE — 99203 OFFICE O/P NEW LOW 30 MIN: CPT | Mod: S$GLB,,, | Performed by: INTERNAL MEDICINE

## 2021-03-22 PROCEDURE — 99999 PR PBB SHADOW E&M-EST. PATIENT-LVL IV: CPT | Mod: PBBFAC,,, | Performed by: INTERNAL MEDICINE

## 2021-03-22 PROCEDURE — 1126F AMNT PAIN NOTED NONE PRSNT: CPT | Mod: S$GLB,,, | Performed by: INTERNAL MEDICINE

## 2021-03-22 PROCEDURE — 1126F PR PAIN SEVERITY QUANTIFIED, NO PAIN PRESENT: ICD-10-PCS | Mod: S$GLB,,, | Performed by: INTERNAL MEDICINE

## 2021-03-22 PROCEDURE — 99999 PR PBB SHADOW E&M-EST. PATIENT-LVL IV: ICD-10-PCS | Mod: PBBFAC,,, | Performed by: INTERNAL MEDICINE

## 2021-03-22 PROCEDURE — 3008F PR BODY MASS INDEX (BMI) DOCUMENTED: ICD-10-PCS | Mod: CPTII,S$GLB,, | Performed by: INTERNAL MEDICINE

## 2021-03-22 RX ORDER — ESTRADIOL 0.07 MG/D
1 FILM, EXTENDED RELEASE TRANSDERMAL WEEKLY
Status: ON HOLD | COMMUNITY
End: 2024-02-28 | Stop reason: HOSPADM

## 2021-03-23 ENCOUNTER — PATIENT MESSAGE (OUTPATIENT)
Dept: CARDIOLOGY | Facility: CLINIC | Age: 66
End: 2021-03-23

## 2021-03-23 ENCOUNTER — PATIENT MESSAGE (OUTPATIENT)
Dept: PRIMARY CARE CLINIC | Facility: CLINIC | Age: 66
End: 2021-03-23

## 2021-04-16 ENCOUNTER — PATIENT OUTREACH (OUTPATIENT)
Dept: ADMINISTRATIVE | Facility: HOSPITAL | Age: 66
End: 2021-04-16

## 2021-04-16 ENCOUNTER — PATIENT MESSAGE (OUTPATIENT)
Dept: RESEARCH | Facility: HOSPITAL | Age: 66
End: 2021-04-16

## 2022-02-10 LAB — BCS RECOMMENDATION EXT: NORMAL

## 2022-03-09 ENCOUNTER — TELEPHONE (OUTPATIENT)
Dept: PRIMARY CARE CLINIC | Facility: CLINIC | Age: 67
End: 2022-03-09
Payer: COMMERCIAL

## 2022-03-09 DIAGNOSIS — Z00.00 ROUTINE GENERAL MEDICAL EXAMINATION AT A HEALTH CARE FACILITY: Primary | ICD-10-CM

## 2022-03-09 NOTE — TELEPHONE ENCOUNTER
----- Message from Aby Koenig sent at 3/9/2022 10:48 AM CST -----  Contact: Pt [phone]  Pt called and stated that her daughter and son in law tested positive for covid and the Pt is haa a stuffy nose and headaches and she would like to know if she should come in for her appt tomorrow. She said that it feels like she has a bad sinus infection.     Please call and advise

## 2022-03-09 NOTE — TELEPHONE ENCOUNTER
Orders in and linked. Patient was scheduled with Dr. Crawford rescheduled with Dr. Shahid due to Covid concerns.

## 2022-03-29 ENCOUNTER — LAB VISIT (OUTPATIENT)
Dept: LAB | Facility: HOSPITAL | Age: 67
End: 2022-03-29
Attending: UROLOGY
Payer: COMMERCIAL

## 2022-03-29 DIAGNOSIS — Z00.00 ROUTINE GENERAL MEDICAL EXAMINATION AT A HEALTH CARE FACILITY: ICD-10-CM

## 2022-03-29 LAB
ALBUMIN SERPL BCP-MCNC: 4.1 G/DL (ref 3.5–5.2)
ALP SERPL-CCNC: 82 U/L (ref 55–135)
ALT SERPL W/O P-5'-P-CCNC: 22 U/L (ref 10–44)
ANION GAP SERPL CALC-SCNC: 7 MMOL/L (ref 8–16)
AST SERPL-CCNC: 16 U/L (ref 10–40)
BASOPHILS # BLD AUTO: 0.07 K/UL (ref 0–0.2)
BASOPHILS NFR BLD: 1 % (ref 0–1.9)
BILIRUB SERPL-MCNC: 0.4 MG/DL (ref 0.1–1)
BUN SERPL-MCNC: 15 MG/DL (ref 8–23)
CALCIUM SERPL-MCNC: 9.4 MG/DL (ref 8.7–10.5)
CHLORIDE SERPL-SCNC: 101 MMOL/L (ref 95–110)
CHOLEST SERPL-MCNC: 210 MG/DL (ref 120–199)
CHOLEST/HDLC SERPL: 4.1 {RATIO} (ref 2–5)
CO2 SERPL-SCNC: 29 MMOL/L (ref 23–29)
CREAT SERPL-MCNC: 0.8 MG/DL (ref 0.5–1.4)
DIFFERENTIAL METHOD: ABNORMAL
EOSINOPHIL # BLD AUTO: 0.2 K/UL (ref 0–0.5)
EOSINOPHIL NFR BLD: 3.2 % (ref 0–8)
ERYTHROCYTE [DISTWIDTH] IN BLOOD BY AUTOMATED COUNT: 12.2 % (ref 11.5–14.5)
EST. GFR  (AFRICAN AMERICAN): >60 ML/MIN/1.73 M^2
EST. GFR  (NON AFRICAN AMERICAN): >60 ML/MIN/1.73 M^2
GLUCOSE SERPL-MCNC: 103 MG/DL (ref 70–110)
HCT VFR BLD AUTO: 43.9 % (ref 37–48.5)
HDLC SERPL-MCNC: 51 MG/DL (ref 40–75)
HDLC SERPL: 24.3 % (ref 20–50)
HGB BLD-MCNC: 13.9 G/DL (ref 12–16)
IMM GRANULOCYTES # BLD AUTO: 0.02 K/UL (ref 0–0.04)
IMM GRANULOCYTES NFR BLD AUTO: 0.3 % (ref 0–0.5)
LDLC SERPL CALC-MCNC: 138 MG/DL (ref 63–159)
LYMPHOCYTES # BLD AUTO: 2.6 K/UL (ref 1–4.8)
LYMPHOCYTES NFR BLD: 36.6 % (ref 18–48)
MCH RBC QN AUTO: 28.6 PG (ref 27–31)
MCHC RBC AUTO-ENTMCNC: 31.7 G/DL (ref 32–36)
MCV RBC AUTO: 90 FL (ref 82–98)
MONOCYTES # BLD AUTO: 0.5 K/UL (ref 0.3–1)
MONOCYTES NFR BLD: 6.8 % (ref 4–15)
NEUTROPHILS # BLD AUTO: 3.7 K/UL (ref 1.8–7.7)
NEUTROPHILS NFR BLD: 52.1 % (ref 38–73)
NONHDLC SERPL-MCNC: 159 MG/DL
NRBC BLD-RTO: 0 /100 WBC
PLATELET # BLD AUTO: 353 K/UL (ref 150–450)
PMV BLD AUTO: 10.7 FL (ref 9.2–12.9)
POTASSIUM SERPL-SCNC: 3.9 MMOL/L (ref 3.5–5.1)
PROT SERPL-MCNC: 7.2 G/DL (ref 6–8.4)
RBC # BLD AUTO: 4.86 M/UL (ref 4–5.4)
SODIUM SERPL-SCNC: 137 MMOL/L (ref 136–145)
TRIGL SERPL-MCNC: 105 MG/DL (ref 30–150)
WBC # BLD AUTO: 7.11 K/UL (ref 3.9–12.7)

## 2022-03-29 PROCEDURE — 80061 LIPID PANEL: CPT | Performed by: FAMILY MEDICINE

## 2022-03-29 PROCEDURE — 80053 COMPREHEN METABOLIC PANEL: CPT | Performed by: FAMILY MEDICINE

## 2022-03-29 PROCEDURE — 85025 COMPLETE CBC W/AUTO DIFF WBC: CPT | Performed by: FAMILY MEDICINE

## 2022-03-29 PROCEDURE — 36415 COLL VENOUS BLD VENIPUNCTURE: CPT | Performed by: FAMILY MEDICINE

## 2022-04-05 ENCOUNTER — PATIENT OUTREACH (OUTPATIENT)
Dept: ADMINISTRATIVE | Facility: HOSPITAL | Age: 67
End: 2022-04-05
Payer: COMMERCIAL

## 2022-04-05 ENCOUNTER — OFFICE VISIT (OUTPATIENT)
Dept: PRIMARY CARE CLINIC | Facility: CLINIC | Age: 67
End: 2022-04-05
Payer: COMMERCIAL

## 2022-04-05 VITALS
WEIGHT: 143.06 LBS | HEART RATE: 75 BPM | BODY MASS INDEX: 24.42 KG/M2 | OXYGEN SATURATION: 98 % | HEIGHT: 64 IN | SYSTOLIC BLOOD PRESSURE: 120 MMHG | DIASTOLIC BLOOD PRESSURE: 80 MMHG | TEMPERATURE: 98 F

## 2022-04-05 DIAGNOSIS — Z79.890 HORMONE REPLACEMENT THERAPY: ICD-10-CM

## 2022-04-05 DIAGNOSIS — M79.642 BILATERAL HAND PAIN: ICD-10-CM

## 2022-04-05 DIAGNOSIS — K25.4 CHRONIC GASTRIC ULCER WITH HEMORRHAGE: ICD-10-CM

## 2022-04-05 DIAGNOSIS — Z00.00 ROUTINE GENERAL MEDICAL EXAMINATION AT A HEALTH CARE FACILITY: Primary | ICD-10-CM

## 2022-04-05 DIAGNOSIS — B00.1 FEVER BLISTER: ICD-10-CM

## 2022-04-05 DIAGNOSIS — M79.641 BILATERAL HAND PAIN: ICD-10-CM

## 2022-04-05 DIAGNOSIS — E02 SUBCLINICAL IODINE-DEFICIENCY HYPOTHYROIDISM: ICD-10-CM

## 2022-04-05 PROCEDURE — 3008F PR BODY MASS INDEX (BMI) DOCUMENTED: ICD-10-PCS | Mod: CPTII,S$GLB,, | Performed by: FAMILY MEDICINE

## 2022-04-05 PROCEDURE — 3079F PR MOST RECENT DIASTOLIC BLOOD PRESSURE 80-89 MM HG: ICD-10-PCS | Mod: CPTII,S$GLB,, | Performed by: FAMILY MEDICINE

## 2022-04-05 PROCEDURE — 1159F PR MEDICATION LIST DOCUMENTED IN MEDICAL RECORD: ICD-10-PCS | Mod: CPTII,S$GLB,, | Performed by: FAMILY MEDICINE

## 2022-04-05 PROCEDURE — 3079F DIAST BP 80-89 MM HG: CPT | Mod: CPTII,S$GLB,, | Performed by: FAMILY MEDICINE

## 2022-04-05 PROCEDURE — 3008F BODY MASS INDEX DOCD: CPT | Mod: CPTII,S$GLB,, | Performed by: FAMILY MEDICINE

## 2022-04-05 PROCEDURE — 99396 PR PREVENTIVE VISIT,EST,40-64: ICD-10-PCS | Mod: S$GLB,,, | Performed by: FAMILY MEDICINE

## 2022-04-05 PROCEDURE — 1159F MED LIST DOCD IN RCRD: CPT | Mod: CPTII,S$GLB,, | Performed by: FAMILY MEDICINE

## 2022-04-05 PROCEDURE — 3074F SYST BP LT 130 MM HG: CPT | Mod: CPTII,S$GLB,, | Performed by: FAMILY MEDICINE

## 2022-04-05 PROCEDURE — 1160F RVW MEDS BY RX/DR IN RCRD: CPT | Mod: CPTII,S$GLB,, | Performed by: FAMILY MEDICINE

## 2022-04-05 PROCEDURE — 99999 PR PBB SHADOW E&M-EST. PATIENT-LVL IV: CPT | Mod: PBBFAC,,, | Performed by: FAMILY MEDICINE

## 2022-04-05 PROCEDURE — 99999 PR PBB SHADOW E&M-EST. PATIENT-LVL IV: ICD-10-PCS | Mod: PBBFAC,,, | Performed by: FAMILY MEDICINE

## 2022-04-05 PROCEDURE — 99396 PREV VISIT EST AGE 40-64: CPT | Mod: S$GLB,,, | Performed by: FAMILY MEDICINE

## 2022-04-05 PROCEDURE — 1160F PR REVIEW ALL MEDS BY PRESCRIBER/CLIN PHARMACIST DOCUMENTED: ICD-10-PCS | Mod: CPTII,S$GLB,, | Performed by: FAMILY MEDICINE

## 2022-04-05 PROCEDURE — 3074F PR MOST RECENT SYSTOLIC BLOOD PRESSURE < 130 MM HG: ICD-10-PCS | Mod: CPTII,S$GLB,, | Performed by: FAMILY MEDICINE

## 2022-04-05 RX ORDER — VALACYCLOVIR HYDROCHLORIDE 1 G/1
TABLET, FILM COATED ORAL
Qty: 12 TABLET | Refills: 3 | Status: SHIPPED | OUTPATIENT
Start: 2022-04-05 | End: 2024-02-26

## 2022-04-05 NOTE — PROGRESS NOTES
"Subjective:      Patient ID: Caitlyn Grace is a 66 y.o. female.    Chief Complaint: Annual Exam    Here today for general exam.     She requests refill valtrex for fever blister flare ups.     She has bilateral hand pain , no specific swelling for years & even stopped crocheting bc of pain. Not better even 7 month later. Some nodules on distal hand joints. GM with arthritis. No family hx of Rheumatoid. Pain does not change throughout day. Not worse in am. Can ache when I grasp, I feel it down my thumb into wrist. Some numbness & tingling into L fingertips when chopping. I do awaken with R hand numb in am.      Mammogram 2022 q yr [facility], sees  Dr Kadi Andersen functional med HRT patch. She noted anemia on her lab work. She was prescribed Hemantinic Iron elemental 200 qd since Nov (4 months), but stopped due to darkened stool. her labs today do not show anemia     Rarely takes NSAIDS - only 3 times in the past year for R leg pain when sleeping (occurred after COVID).     She does have hx  gastric ulcer in the past due to NSAIDS for L shoulder & sacroiliitis. Not nauseated, not vomiting, no blood in stool, but it is dark.     Had cholecystectomy &  appendectomy with removal of "something nearby also with scarring", about 5 years ago. I dont' have those results. No hx of abd surgeyr.     Denies any chest pain, shortness of breath, nausea vomiting constipation diarrhea,  heartburn    Current Outpatient Medications   Medication Instructions    beta-carotene,A,-vits C,E/mins (OCUVITE ORAL) Oral    estradioL (VIVELLE-DOT) 0.075 mg/24 hr 1 patch, Transdermal, Weekly    progesterone (PROMETRIUM) 200 mg, Oral, Daily    thyroid,pork (ARMOUR THYROID ORAL) 90 mg, Oral, Daily    valACYclovir (VALTREX) 1000 MG tablet 1 po bid x 2 d at onset of fever blister       No results found for: HGBA1C  No results found for: MICALBCREAT  Lab Results   Component Value Date    LDLCALC 138.0 03/29/2022    LDLCALC 134.8 04/15/2019    CHOL " 210 (H) 03/29/2022    HDL 51 03/29/2022    TRIG 105 03/29/2022       Lab Results   Component Value Date     03/29/2022    K 3.9 03/29/2022     03/29/2022    CO2 29 03/29/2022     03/29/2022    BUN 15 03/29/2022    CREATININE 0.8 03/29/2022    CALCIUM 9.4 03/29/2022    PROT 7.2 03/29/2022    ALBUMIN 4.1 03/29/2022    BILITOT 0.4 03/29/2022    ALKPHOS 82 03/29/2022    AST 16 03/29/2022    ALT 22 03/29/2022    ANIONGAP 7 (L) 03/29/2022    ESTGFRAFRICA >60.0 03/29/2022    EGFRNONAA >60.0 03/29/2022    WBC 7.11 03/29/2022    HGB 13.9 03/29/2022    HGB 13.9 03/12/2021    HCT 43.9 03/29/2022    MCV 90 03/29/2022     03/29/2022    TSH 1.816 05/05/2016    HEPCAB Negative 05/05/2016       Lab Results   Component Value Date    FSH 27 05/15/2008    ESTRADIOL 111 05/20/2008         Past Medical History:   Diagnosis Date    Abnormal ECG 05/02/2016 5/2/2016: Anterior T wave inversion. Normal stress echo 2016 Dr Grossman    Allergic rhinitis     ENT DR Meade, flonase bid    Bilateral hand pain 4/5/2022    Bunion of right foot 04/25/2019    MTP R 1st, [place]    Chronic gastric ulcer with hemorrhage 4/5/2022    Due to NSAIDS for L shoudler , sacroiliitis    Chronic left shoulder pain 4/18/2018    Fever blister 4/18/2018    Gallbladder sludge April 2014    general surgeon Dr [facility]x [facility] at [facility]    Gastric polyps     EGD 2015 Dr Brian    Hormone replacement therapy 4/5/2022    Postmenopausal vaginal bleeding 4/18/2018    Endometrial biopsy & tx by GYN Mavr Smith 4/18/2018    Topical metronidazole    Seasonal allergic rhinitis due to pollen 4/25/2019    flonase daily, allergy testing in past, ENT Vaibhav    Seronegative spondyloarthropathy 5/2/2016    10/2015: Diagnosed.   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,  , exercises on glider, 5 children, ETOH socially, colonoscopy with diverticulosis per pt 2010, GYN Marv     Family History   Problem Relation Age of Onset    Cancer Father         pancreatic,  81    Diabetes Father     Heart failure Father     Cancer Mother 80        CLL    Alzheimer's disease Mother 83    Atrial fibrillation Mother     Hypertension Mother     Fibromyalgia Daughter      Vitals:    22 0920 22 0945   BP: (!) 140/96 120/80   Pulse: 75    Temp: 97.7 °F (36.5 °C)    SpO2: 98%    Weight: 64.9 kg (143 lb 1.3 oz)    Height: 5' 4" (1.626 m)    PainSc:   4      Objective:   Physical Exam  Constitutional:       Appearance: She is well-developed.   HENT:      Head: Normocephalic and atraumatic.      Nose:      Comments: Wearing mask due to current COVID 19 pandemic, Nose & mouth exam deferred  Eyes:      Pupils: Pupils are equal, round, and reactive to light.   Neck:      Thyroid: No thyromegaly.   Cardiovascular:      Rate and Rhythm: Normal rate and regular rhythm.      Heart sounds: Normal heart sounds. No murmur heard.  Pulmonary:      Effort: Pulmonary effort is normal.      Breath sounds: Normal breath sounds. No wheezing.   Abdominal:      General: Bowel sounds are normal. There is no distension.      Palpations: Abdomen is soft. There is no mass.      Tenderness: There is no abdominal tenderness. There is no guarding or rebound.   Genitourinary:     Rectum: Normal. Guaiac result negative.   Musculoskeletal:      Cervical back: Neck supple.      Comments: no swelling, no erythema, no rash  no thenar or hypothenar atrophy  normal sensation thenar, hypothenar regions  2+ pulses equal bilateral, < 2 second capillary refill   5/5 hand , hand extension, thumbs up and finger extension against resistance , no numbness, tingling, neg " Tinels     Lymphadenopathy:      Cervical: No cervical adenopathy.   Skin:     General: Skin is warm and dry.   Neurological:      Mental Status: She is alert and oriented to person, place, and time.   Psychiatric:         Behavior: Behavior normal.       Assessment:     1. Routine general medical examination at a health care facility    2. Subclinical iodine-deficiency hypothyroidism    3. Hormone replacement therapy    4. Chronic gastric ulcer with hemorrhage    5. Fever blister    6. Bilateral hand pain      Plan:     Orders Placed This Encounter    valACYclovir (VALTREX) 1000 MG tablet     Follow with GYN for female health & cancer prevention    BP recheck nml    Try one tylenol daily    Try carpal tunnel splints at night.     If not better, let me know & consider referral to hand doctor    ==============================  RECOMMENDATIONS FOR FEMALES  ==============================  Your #1 MEDICINE is DAILY EXERCISE - 15-20 minutes of huffing & puffing EVERY DAY.     Prevent the #1 cause of death- cardiovascular disease (HEART ATTACK & STROKE) by checking for normal blood pressure, cholesterol, sugars, & by not smoking.     VACCINES: Yearly FLU shot, PNEUMONIA shot after 65,  SHINGLES shot after 50    COLON CANCER screening colonoscopy starting at 44 yo &  every 10 years (or Cologuard kit every 3 yrs) , repeat test sooner if POLYP is found    I recommend  high fiber (5 fresh fruits or vegetables daily), low fat diet and aerobic  exercise (huffing/ puffing/ sweating for 20 min straight at least 4 days a week)    Follow up yearly with mammogram, fasting lipids, CMP, CBC prior.   ==============================================================      Patient Instructions     Please call   [facility], [phone]  Erum Leonardo [place]ente or [place]  in [place]  They [place] [facility] [place]       ==============================================  FOR ELEVATED BLOOD  PRESSURE -------------    Blood pressure stable < 140/90    Try to stop these things that can elevate blood pressure: hormone medication, salt, caffeine, energy drinks, diet pills, sudafed, alcohol , taking NSAIDS daily (advil, alleve, ibuprofen, naproxen)     DECREASE ALCOHOL CONSUMPTION    DECREASE SALT (fast foods, frozen, canned, processed foods, ham, turkey, fried foods, chips, crackers, etc) & drink 8 glasses of water a day with minimal caffeine ( 1 cup a day)   ==============================================

## 2022-04-05 NOTE — PROGRESS NOTES
Patient due for the following    HIV Screening     Shingles Vaccine (1 of 2)    TETANUS VACCINE     Influenza Vaccine (1)    COVID-19 Vaccine (3 - Booster for Pfizer series)      Received mammogram records.  Scanned to media.  updated    Immunizations: reviewed and updated  Care Everywhere: triggered  Care Teams: up to date  Outreach: none needed

## 2022-04-05 NOTE — PATIENT INSTRUCTIONS
Please call   [facility], [phone]  Dr Baker, Erum, Jero or [place]  in [place] [employer]  They are [place] [facility] [place]       ==============================================  FOR ELEVATED BLOOD PRESSURE -------------    Blood pressure stable < 140/90    Try to stop these things that can elevate blood pressure: hormone medication, salt, caffeine, energy drinks, diet pills, sudafed, alcohol , taking NSAIDS daily (advil, alleve, ibuprofen, naproxen)     DECREASE ALCOHOL CONSUMPTION    DECREASE SALT (fast foods, frozen, canned, processed foods, ham, turkey, fried foods, chips, crackers, etc) & drink 8 glasses of water a day with minimal caffeine ( 1 cup a day)   ==============================================

## 2023-03-01 DIAGNOSIS — Z12.31 OTHER SCREENING MAMMOGRAM: ICD-10-CM

## 2023-03-06 ENCOUNTER — PATIENT MESSAGE (OUTPATIENT)
Dept: ADMINISTRATIVE | Facility: HOSPITAL | Age: 68
End: 2023-03-06
Payer: COMMERCIAL

## 2023-05-11 LAB
BCS RECOMMENDATION EXT: NORMAL
BMD RECOMMENDATION EXT: NORMAL

## 2023-06-06 ENCOUNTER — PATIENT OUTREACH (OUTPATIENT)
Dept: ADMINISTRATIVE | Facility: HOSPITAL | Age: 68
End: 2023-06-06
Payer: COMMERCIAL

## 2023-06-06 LAB — CRC RECOMMENDATION EXT: NORMAL

## 2023-06-06 NOTE — PROGRESS NOTES
Received [facility]-scope records.  Scanned to media.  updated      Retrieved mammogram and dexa records from [facility].  Scanned to media. HM updated

## 2023-06-16 ENCOUNTER — TELEPHONE (OUTPATIENT)
Dept: PRIMARY CARE CLINIC | Facility: CLINIC | Age: 68
End: 2023-06-16
Payer: COMMERCIAL

## 2023-06-16 NOTE — TELEPHONE ENCOUNTER
Pt states that she did follow up with Dr Kadi Andersen and Dr Andersen did blood test to check if cancer markers were elevated and they were not. Dr Andersen feel like that would be too much radiation to take another test right now. She is ok with waiting to do another test.

## 2023-06-16 NOTE — TELEPHONE ENCOUNTER
----- Message from Kristan Shahid MD sent at 6/15/2023  1:58 PM CDT -----  Hello. I received your mammogram that Dr Kadi Andersen ordered. They recommend further imaging. If you haven't scheduled this yet, please call Dr Andersen . She's following this with your hormone replacement

## 2023-10-13 ENCOUNTER — TELEPHONE (OUTPATIENT)
Dept: PRIMARY CARE CLINIC | Facility: CLINIC | Age: 68
End: 2023-10-13
Payer: MEDICARE

## 2023-10-13 NOTE — TELEPHONE ENCOUNTER
----- Message from Florencia Devon sent at 10/13/2023 12:50 PM CDT -----  Contact: Pt @[phone]  Caller is requesting an earlier appointment then we can schedule.  Caller is requesting a message be sent to the provider.  If this is for urgent care symptoms, did you offer other providers at this location, providers at other locations, or [facility]? (yes, no, n/a):    If this is for the patients physical, did you offer to schedule next available and put on wait list, or to see NP or PA for their physical?  (yes, no, n/a):        When is the next available appointment with their provider:  n/a    Reason for the appointment:  annual     Patient preference of timeframe to be scheduled:  January     Would the patient like a call back, or a response through their [facility] portal?:   call back     Comments:   Pt was scheduled for 11.28.23 but will be out of town. She is requesting a call back to advise another appt.

## 2024-01-16 ENCOUNTER — TELEPHONE (OUTPATIENT)
Dept: PRIMARY CARE CLINIC | Facility: CLINIC | Age: 69
End: 2024-01-16
Payer: MEDICARE

## 2024-01-17 ENCOUNTER — OFFICE VISIT (OUTPATIENT)
Dept: PRIMARY CARE CLINIC | Facility: CLINIC | Age: 69
End: 2024-01-17
Payer: MEDICARE

## 2024-01-17 VITALS
TEMPERATURE: 98 F | BODY MASS INDEX: 24.96 KG/M2 | HEART RATE: 68 BPM | OXYGEN SATURATION: 98 % | WEIGHT: 146.19 LBS | HEIGHT: 64 IN | DIASTOLIC BLOOD PRESSURE: 82 MMHG | SYSTOLIC BLOOD PRESSURE: 132 MMHG

## 2024-01-17 DIAGNOSIS — N95.0 POSTMENOPAUSAL VAGINAL BLEEDING: Primary | ICD-10-CM

## 2024-01-17 DIAGNOSIS — E02 SUBCLINICAL IODINE-DEFICIENCY HYPOTHYROIDISM: ICD-10-CM

## 2024-01-17 DIAGNOSIS — E78.49 OTHER HYPERLIPIDEMIA: ICD-10-CM

## 2024-01-17 DIAGNOSIS — R73.09 ELEVATED GLUCOSE: ICD-10-CM

## 2024-01-17 DIAGNOSIS — Z79.890 HORMONE REPLACEMENT THERAPY: ICD-10-CM

## 2024-01-17 DIAGNOSIS — N95.0 POST-MENOPAUSAL BLEEDING: ICD-10-CM

## 2024-01-17 PROCEDURE — 99214 OFFICE O/P EST MOD 30 MIN: CPT | Mod: S$PBB,,, | Performed by: FAMILY MEDICINE

## 2024-01-17 PROCEDURE — 99999 PR PBB SHADOW E&M-EST. PATIENT-LVL V: CPT | Mod: PBBFAC,,, | Performed by: FAMILY MEDICINE

## 2024-01-17 PROCEDURE — 99215 OFFICE O/P EST HI 40 MIN: CPT | Mod: PBBFAC,PN | Performed by: FAMILY MEDICINE

## 2024-01-17 RX ORDER — BUDESONIDE 0.5 MG/2ML
INHALANT ORAL
COMMUNITY
Start: 2023-09-15 | End: 2024-01-18

## 2024-01-17 RX ORDER — CEFDINIR 300 MG/1
300 CAPSULE ORAL
COMMUNITY
End: 2024-01-18

## 2024-01-17 NOTE — PROGRESS NOTES
Assessment:     1. Postmenopausal vaginal bleeding    2. Hormone replacement therapy    3. Subclinical iodine-deficiency hypothyroidism    4. Other hyperlipidemia    5. Elevated glucose          Plan:     Postmenopausal vaginal bleeding  Endometrial biopsy & tx by GYN Marv in past , who retired  Transvag US as we discussed differential including endometrial cancer  Refer to GYN     Hormone replacement therapy  Stable, but may be causing her postmen vag spotting. Follow w Her Wellness doctor Dr Kadi Andersen who prescribed Estradiol patch & progesterone    Elevated glucose  Recommended stop daily [employer] Caramel Macchiato & daily bowl chips  Hga1c 5.1 - no diabetes now    Subclinical iodine-deficiency hypothyroidism  Stable, continue med per wellness MD DR Kadi Andersen     Other hyperlipidemia  Stop Starbucks sugar coffee daily & bowl of chips daily  Move more, sit less  High fiber, good fat (avocado, olive oil, nuts) foods  Try to eat 5 fresh COLORS a day      Follow with her Wellness doctor Dr Kadi Andersen who checks labs q 6 mo & she brings results to me. Dr Andersen orders her Mammograms at [facility]  Follow w GYN for female health & cancer prevention  Move more, High fiber, good fat (avocado, olive oil, nuts) foods  Eat more food grown from the earth (picked from trees or out of the ground)  Colon cancer screening at 44 yo  Follow up yearly with LABS ONE WEEK PRIOR so we can discuss at your visit        CHIEF COMPLAINT: follow up labs    HPI: Caitlyn Grace is a 68 y.o. female with is here today for follow up labs     We reivewd outside labs from Dr Andersen, wellness doctor whom she sees q 6 mo, scanned to chart    Denies chest pain, shortness of breath    Current Outpatient Medications   Medication Instructions    beta-carotene,A,-vits C,E/mins (OCUVITE ORAL) Oral    estradioL (VIVELLE-DOT) 0.075 mg/24 hr 1 patch, Transdermal, Weekly    progesterone (PROMETRIUM) 100 mg, Oral, Daily    thyroid,pork (ARMOUR THYROID  "ORAL) 90 mg, Oral, Daily    UNABLE TO FIND 120 mg, Oral, Daily, NP Thyroid    valACYclovir (VALTREX) 1000 MG tablet 1 po bid x 2 d at onset of fever blister       No results found for: "HGBA1C"  No results found for: "MICALBCREAT"  Lab Results   Component Value Date    LDLCALC 138.0 03/29/2022    LDLCALC 134.8 04/15/2019    CHOL 210 (H) 03/29/2022    HDL 51 03/29/2022    TRIG 105 03/29/2022       Lab Results   Component Value Date     03/29/2022    K 3.9 03/29/2022     03/29/2022    CO2 29 03/29/2022     03/29/2022    BUN 15 03/29/2022    CREATININE 0.8 03/29/2022    CALCIUM 9.4 03/29/2022    PROT 7.2 03/29/2022    ALBUMIN 4.1 03/29/2022    BILITOT 0.4 03/29/2022    ALKPHOS 82 03/29/2022    AST 16 03/29/2022    ALT 22 03/29/2022    ANIONGAP 7 (L) 03/29/2022    ESTGFRAFRICA >60.0 03/29/2022    EGFRNONAA >60.0 03/29/2022    WBC 7.11 03/29/2022    HGB 13.9 03/29/2022    HGB 13.9 03/12/2021    HCT 43.9 03/29/2022    MCV 90 03/29/2022     03/29/2022    TSH 1.816 05/05/2016    HEPCAB Negative 05/05/2016       Lab Results   Component Value Date    FSH 27 05/15/2008    ESTRADIOL 111 05/20/2008         Past Medical History:   Diagnosis Date    Abnormal ECG 05/02/2016 5/2/2016: Anterior T wave inversion. Normal stress echo 2016 Dr Grossman    Allergic rhinitis     ENT DR Meade, flonase bid    Bilateral hand pain 4/5/2022    Bunion of right foot 04/25/2019    MTP R 1st, [place]    Chronic gastric ulcer with hemorrhage 4/5/2022    Due to NSAIDS for L shoudler , sacroiliitis    Chronic left shoulder pain 4/18/2018    Fever blister 4/18/2018    Gallbladder sludge April 2014    general surgeon Dr Maxx Paige at [facility]    Gastric polyps     EGD 2015 Dr Brian    Hormone replacement therapy 4/5/2022    Postmenopausal vaginal bleeding 4/18/2018    Endometrial biopsy & tx by GYN Marv Smith 4/18/2018    Topical metronidazole    Seasonal allergic rhinitis due to pollen 4/25/2019    flonase " "daily, allergy testing in past, ENT [facility]    Seronegative spondyloarthropathy 5/2/2016    10/2015: Diagnosed.    Subclinical iodine-deficiency hypothyroidism 4/5/2022     Past Surgical History:   Procedure Laterality Date    APPENDECTOMY      CHOLECYSTECTOMY      SALIVARY GLAND SURGERY      R parotid, August 2012, Dr Meade ENT     Vitals:    01/17/24 1306   BP: 132/82   Pulse: 68   Temp: 98.3 °F (36.8 °C)   TempSrc: Oral   SpO2: 98%   Weight: 66.3 kg (146 lb 2.6 oz)   Height: 5' 4" (1.626 m)   PainSc: 0-No pain     Wt Readings from Last 5 Encounters:   01/17/24 66.3 kg (146 lb 2.6 oz)   04/05/22 64.9 kg (143 lb 1.3 oz)   03/22/21 64.3 kg (141 lb 10.3 oz)   03/12/21 64.7 kg (142 lb 10.2 oz)   10/30/19 (P) 62.7 kg (138 lb 3.7 oz)     Objective:   Physical Exam  Constitutional:       Appearance: She is well-developed.   Eyes:      Pupils: Pupils are equal, round, and reactive to light.   Cardiovascular:      Rate and Rhythm: Normal rate and regular rhythm.      Heart sounds: Normal heart sounds. No murmur heard.  Pulmonary:      Effort: Pulmonary effort is normal.      Breath sounds: Normal breath sounds. No wheezing.   Abdominal:      General: Bowel sounds are normal. There is no distension.      Palpations: Abdomen is soft. There is no mass.      Tenderness: There is no abdominal tenderness. There is no guarding or rebound.   Musculoskeletal:      Cervical back: Neck supple.   Skin:     General: Skin is warm and dry.   Neurological:      Mental Status: She is alert.   Psychiatric:         Behavior: Behavior normal.                                     "

## 2024-01-17 NOTE — ASSESSMENT & PLAN NOTE
Stable, but may be causing her postmen vag spotting. Follow w Her Wellness doctor Dr Kadi Andersen who prescribed Estradiol patch & progesterone

## 2024-01-17 NOTE — ASSESSMENT & PLAN NOTE
Recommended stop daily [employer] Caramel Macchiato & daily bowl chips  Hga1c 5.1 - no diabetes now

## 2024-01-18 NOTE — ASSESSMENT & PLAN NOTE
Stop [employer] sugar coffee daily & bowl of chips daily  Move more, sit less  High fiber, good fat (avocado, olive oil, nuts) foods  Try to eat 5 fresh COLORS a day

## 2024-02-06 ENCOUNTER — PATIENT MESSAGE (OUTPATIENT)
Dept: PRIMARY CARE CLINIC | Facility: CLINIC | Age: 69
End: 2024-02-06
Payer: MEDICARE

## 2024-02-08 ENCOUNTER — TELEPHONE (OUTPATIENT)
Dept: PRIMARY CARE CLINIC | Facility: CLINIC | Age: 69
End: 2024-02-08
Payer: MEDICARE

## 2024-02-08 ENCOUNTER — OFFICE VISIT (OUTPATIENT)
Dept: OBSTETRICS AND GYNECOLOGY | Facility: CLINIC | Age: 69
End: 2024-02-08
Payer: MEDICARE

## 2024-02-08 ENCOUNTER — PATIENT MESSAGE (OUTPATIENT)
Dept: PRIMARY CARE CLINIC | Facility: CLINIC | Age: 69
End: 2024-02-08
Payer: MEDICARE

## 2024-02-08 VITALS
WEIGHT: 143.31 LBS | DIASTOLIC BLOOD PRESSURE: 77 MMHG | BODY MASS INDEX: 24.6 KG/M2 | HEART RATE: 73 BPM | SYSTOLIC BLOOD PRESSURE: 161 MMHG

## 2024-02-08 DIAGNOSIS — N95.0 POST-MENOPAUSAL BLEEDING: ICD-10-CM

## 2024-02-08 DIAGNOSIS — Z78.0 POSTMENOPAUSAL: Primary | ICD-10-CM

## 2024-02-08 DIAGNOSIS — R93.89 THICKENED ENDOMETRIUM: ICD-10-CM

## 2024-02-08 PROCEDURE — 88341 IMHCHEM/IMCYTCHM EA ADD ANTB: CPT | Performed by: PATHOLOGY

## 2024-02-08 PROCEDURE — 99213 OFFICE O/P EST LOW 20 MIN: CPT | Mod: PBBFAC,PN | Performed by: OBSTETRICS & GYNECOLOGY

## 2024-02-08 PROCEDURE — 88342 IMHCHEM/IMCYTCHM 1ST ANTB: CPT | Mod: 26,,, | Performed by: PATHOLOGY

## 2024-02-08 PROCEDURE — 88342 IMHCHEM/IMCYTCHM 1ST ANTB: CPT | Performed by: PATHOLOGY

## 2024-02-08 PROCEDURE — 88305 TISSUE EXAM BY PATHOLOGIST: CPT | Performed by: PATHOLOGY

## 2024-02-08 PROCEDURE — 58100 BIOPSY OF UTERUS LINING: CPT | Mod: S$PBB,,, | Performed by: OBSTETRICS & GYNECOLOGY

## 2024-02-08 PROCEDURE — 88341 IMHCHEM/IMCYTCHM EA ADD ANTB: CPT | Mod: 26,,, | Performed by: PATHOLOGY

## 2024-02-08 PROCEDURE — 58100 BIOPSY OF UTERUS LINING: CPT | Mod: PBBFAC,PN | Performed by: OBSTETRICS & GYNECOLOGY

## 2024-02-08 PROCEDURE — 99999 PR PBB SHADOW E&M-EST. PATIENT-LVL III: CPT | Mod: PBBFAC,,, | Performed by: OBSTETRICS & GYNECOLOGY

## 2024-02-08 PROCEDURE — 99214 OFFICE O/P EST MOD 30 MIN: CPT | Mod: 25,S$PBB,, | Performed by: OBSTETRICS & GYNECOLOGY

## 2024-02-08 PROCEDURE — 88305 TISSUE EXAM BY PATHOLOGIST: CPT | Mod: 26,,, | Performed by: PATHOLOGY

## 2024-02-08 NOTE — TELEPHONE ENCOUNTER
----- Message from Nicholas Gutierrez, LPN sent at 2/7/2024  4:40 PM CST -----  Regarding: US needed  Can this patient's ultrasound be scanned into her chart? I'm sorry to send this request as urgent, however, I'm adding the patient on for tomorrow as her vaginal bleeding has transitioned to more than spotting (per patient).

## 2024-02-08 NOTE — PROGRESS NOTES
CC: thickened endometrium  Postmenopausal bleeding    Caitlyn Grace is a 68 y.o. female  presents for postmenopausal bleeding.  She had a thickened endometrium-  1.2 cm  US 7.8 X 4.5 cm X 4.6 cm  ( See [facility] report)    Past Medical History:   Diagnosis Date    Abnormal ECG 2016: Anterior T wave inversion. Normal stress echo  Dr Grossman    Allergic rhinitis     ENT DR Meade, flonase bid    Bilateral hand pain 2022    Bunion of right foot 2019    MTP R 1st, [place]    Chronic gastric ulcer with hemorrhage 2022    Due to NSAIDS for L shoudler , sacroiliitis    Chronic left shoulder pain 2018    Fever blister 2018    Gallbladder sludge 2014    [facility] surgeon Dr [facility]x [facility]    Gastric polyps     EGD  Dr Brian    Hormone replacement therapy 2022    Postmenopausal vaginal bleeding 2018    Endometrial biopsy & tx by GYN Marv Smith 2018    Topical metronidazole    Seasonal allergic rhinitis due to pollen 2019    flonase daily, allergy testing in past, ENT [facility]    Seronegative spondyloarthropathy 2016    10/2015: Diagnosed.    Subclinical iodine-deficiency hypothyroidism 2022       Past Surgical History:   Procedure Laterality Date    APPENDECTOMY      CHOLECYSTECTOMY      SALIVARY GLAND SURGERY      R parotid, 2012, Dr Meade ENT       OB History    Para Term  AB Living   3 3 3     3   SAB IAB Ectopic Multiple Live Births                  # Outcome Date GA Lbr Augusto/2nd Weight Sex Delivery Anes PTL Lv   3 Term     F Vag-Spont      2 Term     F Vag-Spont      1 Term     M Vag-Spont          Family History   Problem Relation Age of Onset    Cancer Father         pancreatic,  81    Diabetes Father     Heart failure Father     Cancer Mother 80        CLL    Alzheimer's disease Mother 83    Atrial fibrillation Mother     Hypertension Mother     Fibromyalgia Daughter        Social  History     Tobacco Use    Smoking status: Never    Smokeless tobacco: Never   Substance Use Topics    Alcohol use: Yes     Comment: social    Drug use: No       BP (!) 161/77   Pulse 73   Wt 65 kg (143 lb 4.8 oz)   LMP 02/10/2014   BMI 24.60 kg/m²     ROS:  GENERAL: Denies weight gain or weight loss. Feeling well overall.   SKIN: Denies rash or lesions.   HEAD: Denies head injury or headache.   NODES: Denies enlarged lymph nodes.   CHEST: Denies chest pain or shortness of breath.   CARDIOVASCULAR: Denies palpitations or left sided chest pain.   ABDOMEN: No abdominal pain, constipation, diarrhea, nausea, vomiting or rectal bleeding.   URINARY: No frequency, dysuria, hematuria, or burning on urination.  REPRODUCTIVE: See HPI.   BREASTS: The patient performs breast self-examination and denies pain, lumps, or nipple discharge.   HEMATOLOGIC: No easy bruisability or excessive bleeding.  MUSCULOSKELETAL: Denies joint pain or swelling.   NEUROLOGIC: Denies syncope or weakness.   PSYCHIATRIC: Denies depression, anxiety or mood swings.    Physical Exam:    APPEARANCE: Well nourished, well developed, in no acute distress.  AFFECT: WNL, alert and oriented x 3  SKIN: No acne or hirsutism  NECK: Neck symmetric without masses or thyromegaly  NODES: No inguinal, cervical, axillary, or femoral lymph node enlargement  CHEST: Good respiratory effect  ABDOMEN: Soft.  No tenderness or masses.  No hepatosplenomegaly.  No hernias.  PELVIC: Normal external genitalia without lesions.  Normal hair distribution.  Adequate perineal body, normal urethral meatus.  Vagina moist and well rugated without lesions or discharge.  Cervix pink, without lesions, discharge or tenderness.  No significant cystocele or rectocele.  Bimanual exam shows uterus to be normal size, regular, mobile and nontender.  Adnexa without masses or tenderness.    EXTREMITIES: No edema.  Here for endometrial biopsy  Risks, benefits and alternatives to procedure  discussed.        Patient presents for endometrial biopsy.      The risks, benefits and alternatives to procedure was discussed, and will also determine the plan of care, treatments available, the minimal risk of bleeding and infection with endometrial biopsy,and she agrees to proceed.      UPT is negative    ENDOMETRIAL BIOPSY     The cervix was swabbed with betadine times 3.  The anterior lip of the cervix was grasped with a single toothed tenaculum.  A uterine pipelle was inserted into the uterus to a level of 7cm.  The patient tolerated with above procedure WELL.     All collected specimens sent to pathology for histologic analysis.    Post-biopsy counseling:  The patient was instructed to manage post endometrial biopsy cramping with NSAIDs or Tylenol, or with a prescription per the medication card.  Avoid intercourse, douching, or tampons in the vagina for at least 2-3 days.  .  Report heavy bleeding, worsening pain or pain that does not respond to above medications, or foul-smelling vaginal discharge.   Importance of follow-up stressed.      Follow up based on endometrial biopsy results.      ASSESSMENT AND PLAN  1. Postmenopausal  Specimen to Pathology, Ob/Gyn      2. Thickened endometrium            Patient was counseled today on A.C.S. Pap guidelines and recommendations for yearly pelvic exams, mammograms and monthly self breast exams; to see her PCP for other health maintenance.     Consider possible RA TLH, BSO    Follow up if symptoms worsen or fail to improve.

## 2024-02-08 NOTE — TELEPHONE ENCOUNTER
----- Message from Kristan Shahid MD sent at 2/8/2024  1:29 PM CST -----  Regarding: FW: US needed  I sent her a message to take a picture of her [facility] Imaging Female Utrasound report & [facility]  so the GYN department can see it before her appt.   Please call pt & ask her to take a picture. thanks  ----- Message -----  From: Nicholas Gutierrez LPN  Sent: 2/7/2024   4:42 PM CST  To: Kristan Shahid MD; [facility] Staff  Subject: US needed                                        Can this patient's ultrasound be scanned into her chart? I'm sorry to send this request as urgent, however, I'm adding the patient on for tomorrow as her vaginal bleeding has transitioned to more than spotting (per patient).

## 2024-02-14 LAB
FINAL PATHOLOGIC DIAGNOSIS: NORMAL
GROSS: NORMAL
Lab: NORMAL

## 2024-02-15 ENCOUNTER — PATIENT MESSAGE (OUTPATIENT)
Dept: OBSTETRICS AND GYNECOLOGY | Facility: CLINIC | Age: 69
End: 2024-02-15
Payer: MEDICARE

## 2024-02-15 ENCOUNTER — TELEPHONE (OUTPATIENT)
Dept: OBSTETRICS AND GYNECOLOGY | Facility: CLINIC | Age: 69
End: 2024-02-15
Payer: MEDICARE

## 2024-02-15 DIAGNOSIS — C54.1 ENDOMETRIAL CANCER: Primary | ICD-10-CM

## 2024-02-15 NOTE — TELEPHONE ENCOUNTER
Called the patient to discuss her endometrial cancer on biopsy  Referral is in for GYN ONC  Please send patient message with GYN ONC number since she will  call them as well  Patient is aware next step is appt with GYN ONC   Nell LEONARDO

## 2024-02-15 NOTE — TELEPHONE ENCOUNTER
please review and respond to patient's MyChart message, Thanks:     Good morning Dr. Ga,  I saw the biopsy results, not sure I understand it all but it does not look good.  What are my next steps?     The spotting/bleeding stopped yesterday; however, strong cramps continue.  Seems to be cramps/discomfort/ pressure on the right side.       Appreciate your help,  Caitlyn [email or web address]  [phone] - cell

## 2024-02-19 ENCOUNTER — TELEPHONE (OUTPATIENT)
Dept: GYNECOLOGIC ONCOLOGY | Facility: CLINIC | Age: 69
End: 2024-02-19

## 2024-02-19 ENCOUNTER — OFFICE VISIT (OUTPATIENT)
Dept: GYNECOLOGIC ONCOLOGY | Facility: CLINIC | Age: 69
End: 2024-02-19
Payer: MEDICARE

## 2024-02-19 ENCOUNTER — HOSPITAL ENCOUNTER (OUTPATIENT)
Dept: RADIOLOGY | Facility: HOSPITAL | Age: 69
Discharge: HOME | End: 2024-02-19
Attending: OBSTETRICS & GYNECOLOGY
Payer: MEDICARE

## 2024-02-19 VITALS
BODY MASS INDEX: 23.88 KG/M2 | HEART RATE: 86 BPM | DIASTOLIC BLOOD PRESSURE: 81 MMHG | HEIGHT: 65 IN | WEIGHT: 143.31 LBS | SYSTOLIC BLOOD PRESSURE: 172 MMHG

## 2024-02-19 DIAGNOSIS — C54.1 ENDOMETRIAL CANCER: Primary | ICD-10-CM

## 2024-02-19 DIAGNOSIS — C54.1 ENDOMETRIAL CANCER: ICD-10-CM

## 2024-02-19 DIAGNOSIS — C54.1 ENDOMETRIAL CANCER DETERMINED BY UTERINE BIOPSY: ICD-10-CM

## 2024-02-19 DIAGNOSIS — C54.1 ENDOMETRIAL CANCER DETERMINED BY UTERINE BIOPSY: Primary | ICD-10-CM

## 2024-02-19 PROCEDURE — 99213 OFFICE O/P EST LOW 20 MIN: CPT | Mod: PBBFAC | Performed by: OBSTETRICS & GYNECOLOGY

## 2024-02-19 PROCEDURE — 74177 CT ABD & PELVIS W/CONTRAST: CPT | Mod: TC

## 2024-02-19 PROCEDURE — 71260 CT THORAX DX C+: CPT | Mod: 26,,, | Performed by: RADIOLOGY

## 2024-02-19 PROCEDURE — 25500020 PHARM REV CODE 255: Performed by: OBSTETRICS & GYNECOLOGY

## 2024-02-19 PROCEDURE — 74177 CT ABD & PELVIS W/CONTRAST: CPT | Mod: 26,,, | Performed by: RADIOLOGY

## 2024-02-19 PROCEDURE — 99999 PR PBB SHADOW E&M-EST. PATIENT-LVL III: CPT | Mod: PBBFAC,,, | Performed by: OBSTETRICS & GYNECOLOGY

## 2024-02-19 PROCEDURE — 99215 OFFICE O/P EST HI 40 MIN: CPT | Mod: S$PBB,,, | Performed by: OBSTETRICS & GYNECOLOGY

## 2024-02-19 RX ADMIN — IOHEXOL 75 ML: 350 INJECTION, SOLUTION INTRAVENOUS at 05:02

## 2024-02-19 NOTE — H&P (VIEW-ONLY)
"GYN ONC CONSULT     SUBJECTIVE:     Chief Complaint: Endometrial Cancer      History of Present Illness:  68 yr old para 3 referred from Dr. Ga for recently diagnosed endometrial cancer. Evaluated for postmenopausal bleeding that has been intermittent for years on HRT but more frequent and heavier since the fall of . Biopsy resulted as Grade 2 endometrioid endometrial cancer.     Pelvic US  Uterus 7.8 X 4.5 cm X 4.6 cm, ES 1.2 cm    EMBx  endometrial endometrioid carcinoma FIGO grade 2     LMP unsure, HRT (estrogen patch/progesterone) until recently   Last pap smear 2023, normal per pt. Had "laser cone" in her 20s and normal paps since.  MMG/colonoscopy UTD  Medical co morbidities include chronic gastric ulcer, subclinical hypothyroid. BMI 24  Prior appendectomy & cholecystectomy (done at same time).  x 3  Family history for mother - CLL. Father - pancreatic cancer. No breast/gyn/colon cancers.    Review of patient's allergies indicates:   Allergen Reactions    Clindamycin Rash    Doxycycline     Sulfa (sulfonamide antibiotics) Rash       Past Medical History:   Diagnosis Date    Abnormal ECG 2016: Anterior T wave inversion. Normal stress echo  Dr Grossman    Allergic rhinitis     ENT DR Meade, flonase bid    Bilateral hand pain 2022    Bunion of right foot 2019    MTP R 1st, [place]    Chronic gastric ulcer with hemorrhage 2022    Due to NSAIDS for L shoudler , sacroiliitis    Chronic left shoulder pain 2018    Fever blister 2018    Gallbladder sludge 2014    general surgeon Dr Maxx Paige at [facility]    Gastric polyps     EGD  Dr Brian    Hormone replacement therapy 2022    Postmenopausal vaginal bleeding 2018    Endometrial biopsy & tx by GYN Marv Smith 2018    Topical metronidazole    Seasonal allergic rhinitis due to pollen 2019    flonase daily, allergy testing in past, ENT [facility]    Seronegative " spondyloarthropathy 2016    10/2015: Diagnosed.    Subclinical iodine-deficiency hypothyroidism 2022     Past Surgical History:   Procedure Laterality Date    APPENDECTOMY      laparoscopic same time as breezy    CHOLECYSTECTOMY      SALIVARY GLAND SURGERY      R parotid, 2012, Dr Meade ENT     OB History          3    Para   3    Term   3            AB        Living   3         SAB        IAB        Ectopic        Multiple        Live Births                   Family History   Problem Relation Age of Onset    Cancer Mother 80        CLL    Alzheimer's disease Mother 83    Atrial fibrillation Mother     Hypertension Mother     Cancer Father         pancreatic,  81    Diabetes Father     Heart failure Father     Fibromyalgia Daughter     Ovarian cancer Neg Hx     Uterine cancer Neg Hx     Breast cancer Neg Hx     Colon cancer Neg Hx      Social History     Tobacco Use    Smoking status: Never    Smokeless tobacco: Never   Substance Use Topics    Alcohol use: Yes     Comment: social    Drug use: No       Current Outpatient Medications   Medication Sig    beta-carotene,A,-vits C,E/mins (OCUVITE ORAL) Take by mouth.    estradioL (VIVELLE-DOT) 0.075 mg/24 hr Place 1 patch onto the skin once a week.    progesterone (PROMETRIUM) 200 MG capsule Take 100 mg by mouth once daily.    thyroid,pork (ARMOUR THYROID ORAL) Take 90 mg by mouth once daily.    UNABLE TO FIND Take 120 mg by mouth once daily. NP Thyroid    valACYclovir (VALTREX) 1000 MG tablet 1 po bid x 2 d at onset of fever blister (Patient not taking: Reported on 2024)     No current facility-administered medications for this visit.     Review of Systems:  Review of Systems   Constitutional:  Positive for fatigue. Negative for activity change, appetite change and unexpected weight change.   Respiratory:  Negative for shortness of breath.    Cardiovascular:  Negative for chest pain and leg swelling.   Gastrointestinal:   "Positive for nausea. Negative for abdominal pain, constipation, diarrhea and vomiting.   Endocrine: Positive for hypothyroidism. Negative for diabetes.   Genitourinary:  Positive for pelvic pain, vaginal bleeding and postmenopausal bleeding. Negative for dysuria, frequency, hematuria and menstrual problem.   Neurological:  Positive for headaches.   Psychiatric/Behavioral:  The patient is nervous/anxious.       BP (!) 172/81   Pulse 86   Ht 5' 5" (1.651 m)   Wt 65 kg (143 lb 4.8 oz)   LMP 02/10/2014   BMI 23.85 kg/m²     OBJECTIVE:     Physical Exam:  Physical Exam  Vitals and nursing note reviewed. Exam conducted with a chaperone present.   Constitutional:       General: She is not in acute distress.     Appearance: Normal appearance. She is well-developed and normal weight. She is not diaphoretic.   HENT:      Head: Normocephalic and atraumatic.   Eyes:      General: No scleral icterus.  Pulmonary:      Effort: Pulmonary effort is normal. No respiratory distress.   Abdominal:      General: There is no distension.      Palpations: Abdomen is soft. There is no mass.      Tenderness: There is no abdominal tenderness.   Genitourinary:     General: Normal vulva.      Labia:         Right: No rash, tenderness or lesion.         Left: No rash, tenderness or lesion.       Vagina: Normal. No vaginal discharge or bleeding.      Cervix: Normal.      Uterus: Normal.       Adnexa: Right adnexa normal and left adnexa normal.        Right: No mass, tenderness or fullness.          Left: No mass, tenderness or fullness.     Musculoskeletal:         General: Normal range of motion.      Cervical back: Normal range of motion.      Right lower leg: No edema.      Left lower leg: No edema.   Skin:     General: Skin is warm and dry.      Coloration: Skin is not pale.      Findings: No rash.   Neurological:      Mental Status: She is alert and oriented to person, place, and time.   Psychiatric:         Mood and Affect: Mood normal. "         Behavior: Behavior normal.       ASSESSMENT:       ICD-10-CM ICD-9-CM    1. Endometrial cancer determined by uterine biopsy  C54.1 182.0 CT Chest Abdomen Pelvis W W/O Contrast (XPD)      [facility] ONCOLOGY      COMPREHENSIVE METABOLIC PANEL      2. Endometrial cancer  C54.1 182.0 Ambulatory referral/consult to Gynecologic Oncology      CT Chest Abdomen Pelvis W W/O Contrast (XPD)      [facility] ONCOLOGY      COMPREHENSIVE METABOLIC PANEL         Plan:   -    I have reveiwed the referring providers documentation, imaging report, procedure note and pathology report.   -   Today the patients diagnosis of endometrial adenocarcinoma was discussed with her in detail using the assistance of visual aids. We discussed that primary treatment for endometrial cancer is surgery. Adjuvant therapy will depend upon final pathology and staging. We discussed recommendations to proceed with RATLH/BSO/SLNB.We reviewed risks of surgery including bleeding, need for transfusion, infection, trauma to surrounding structures (blood vessels, nerves, organs including bowel, bladder, ureters), cardiac events, VTE and even death. She understands and agrees to proceed. Consents signed.  - Pre procedure anesthesia appt   - Pre op CT CAP for metastatic evaluation given long interval of reported vaginal bleeding     Jodie Velazquez MD  Gynecologic Oncology       A total of 60 minutes were spent on encounter including record review, imaging review, counseling patient, coordinating future care.

## 2024-02-19 NOTE — PROGRESS NOTES
"GYN ONC CONSULT     SUBJECTIVE:     Chief Complaint: Endometrial Cancer      History of Present Illness:  68 yr old para 3 referred from Dr. Ga for recently diagnosed endometrial cancer. Evaluated for postmenopausal bleeding that has been intermittent for years on HRT but more frequent and heavier since the fall of . Biopsy resulted as Grade 2 endometrioid endometrial cancer.     Pelvic US  Uterus 7.8 X 4.5 cm X 4.6 cm, ES 1.2 cm    EMBx  endometrial endometrioid carcinoma FIGO grade 2     LMP unsure, HRT (estrogen patch/progesterone) until recently   Last pap smear 2023, normal per pt. Had "laser cone" in her 20s and normal paps since.  MMG/colonoscopy UTD  Medical co morbidities include chronic gastric ulcer, subclinical hypothyroid. BMI 24  Prior appendectomy & cholecystectomy (done at same time).  x 3  Family history for mother - CLL. Father - pancreatic cancer. No breast/gyn/colon cancers.    Review of patient's allergies indicates:   Allergen Reactions    Clindamycin Rash    Doxycycline     Sulfa (sulfonamide antibiotics) Rash       Past Medical History:   Diagnosis Date    Abnormal ECG 2016: Anterior T wave inversion. Normal stress echo  Dr Grossman    Allergic rhinitis     ENT DR Meade, flonase bid    Bilateral hand pain 2022    Bunion of right foot 2019    MTP R 1st, [place]    Chronic gastric ulcer with hemorrhage 2022    Due to NSAIDS for L shoudler , sacroiliitis    Chronic left shoulder pain 2018    Fever blister 2018    Gallbladder sludge 2014    general surgeon Dr [facility]x [facility]ige at [facility]    Gastric polyps     EGD  Dr Brian    Hormone replacement therapy 2022    Postmenopausal vaginal bleeding 2018    Endometrial biopsy & tx by GYN Marv Smith 2018    Topical metronidazole    Seasonal allergic rhinitis due to pollen 2019    flonase daily, allergy testing in past, ENT [facility]    Seronegative " spondyloarthropathy 2016    10/2015: Diagnosed.    Subclinical iodine-deficiency hypothyroidism 2022     Past Surgical History:   Procedure Laterality Date    APPENDECTOMY      laparoscopic same time as breezy    CHOLECYSTECTOMY      SALIVARY GLAND SURGERY      R parotid, 2012, Dr Meade ENT     OB History          3    Para   3    Term   3            AB        Living   3         SAB        IAB        Ectopic        Multiple        Live Births                   Family History   Problem Relation Age of Onset    Cancer Mother 80        CLL    Alzheimer's disease Mother 83    Atrial fibrillation Mother     Hypertension Mother     Cancer Father         pancreatic,  81    Diabetes Father     Heart failure Father     Fibromyalgia Daughter     Ovarian cancer Neg Hx     Uterine cancer Neg Hx     Breast cancer Neg Hx     Colon cancer Neg Hx      Social History     Tobacco Use    Smoking status: Never    Smokeless tobacco: Never   Substance Use Topics    Alcohol use: Yes     Comment: social    Drug use: No       Current Outpatient Medications   Medication Sig    beta-carotene,A,-vits C,E/mins (OCUVITE ORAL) Take by mouth.    estradioL (VIVELLE-DOT) 0.075 mg/24 hr Place 1 patch onto the skin once a week.    progesterone (PROMETRIUM) 200 MG capsule Take 100 mg by mouth once daily.    thyroid,pork (ARMOUR THYROID ORAL) Take 90 mg by mouth once daily.    UNABLE TO FIND Take 120 mg by mouth once daily. NP Thyroid    valACYclovir (VALTREX) 1000 MG tablet 1 po bid x 2 d at onset of fever blister (Patient not taking: Reported on 2024)     No current facility-administered medications for this visit.     Review of Systems:  Review of Systems   Constitutional:  Positive for fatigue. Negative for activity change, appetite change and unexpected weight change.   Respiratory:  Negative for shortness of breath.    Cardiovascular:  Negative for chest pain and leg swelling.   Gastrointestinal:   "Positive for nausea. Negative for abdominal pain, constipation, diarrhea and vomiting.   Endocrine: Positive for hypothyroidism. Negative for diabetes.   Genitourinary:  Positive for pelvic pain, vaginal bleeding and postmenopausal bleeding. Negative for dysuria, frequency, hematuria and menstrual problem.   Neurological:  Positive for headaches.   Psychiatric/Behavioral:  The patient is nervous/anxious.       BP (!) 172/81   Pulse 86   Ht 5' 5" (1.651 m)   Wt 65 kg (143 lb 4.8 oz)   LMP 02/10/2014   BMI 23.85 kg/m²     OBJECTIVE:     Physical Exam:  Physical Exam  Vitals and nursing note reviewed. Exam conducted with a chaperone present.   Constitutional:       General: She is not in acute distress.     Appearance: Normal appearance. She is well-developed and normal weight. She is not diaphoretic.   HENT:      Head: Normocephalic and atraumatic.   Eyes:      General: No scleral icterus.  Pulmonary:      Effort: Pulmonary effort is normal. No respiratory distress.   Abdominal:      General: There is no distension.      Palpations: Abdomen is soft. There is no mass.      Tenderness: There is no abdominal tenderness.   Genitourinary:     General: Normal vulva.      Labia:         Right: No rash, tenderness or lesion.         Left: No rash, tenderness or lesion.       Vagina: Normal. No vaginal discharge or bleeding.      Cervix: Normal.      Uterus: Normal.       Adnexa: Right adnexa normal and left adnexa normal.        Right: No mass, tenderness or fullness.          Left: No mass, tenderness or fullness.     Musculoskeletal:         General: Normal range of motion.      Cervical back: Normal range of motion.      Right lower leg: No edema.      Left lower leg: No edema.   Skin:     General: Skin is warm and dry.      Coloration: Skin is not pale.      Findings: No rash.   Neurological:      Mental Status: She is alert and oriented to person, place, and time.   Psychiatric:         Mood and Affect: Mood normal. "         Behavior: Behavior normal.       ASSESSMENT:       ICD-10-CM ICD-9-CM    1. Endometrial cancer determined by uterine biopsy  C54.1 182.0 CT Chest Abdomen Pelvis W W/O Contrast (XPD)      [facility] ONCOLOGY      COMPREHENSIVE METABOLIC PANEL      2. Endometrial cancer  C54.1 182.0 Ambulatory referral/consult to Gynecologic Oncology      CT Chest Abdomen Pelvis W W/O Contrast (XPD)      [facility] ONCOLOGY      COMPREHENSIVE METABOLIC PANEL         Plan:   -    I have reveiwed the referring providers documentation, imaging report, procedure note and pathology report.   -   Today the patients diagnosis of endometrial adenocarcinoma was discussed with her in detail using the assistance of visual aids. We discussed that primary treatment for endometrial cancer is surgery. Adjuvant therapy will depend upon final pathology and staging. We discussed recommendations to proceed with RATLH/BSO/SLNB.We reviewed risks of surgery including bleeding, need for transfusion, infection, trauma to surrounding structures (blood vessels, nerves, organs including bowel, bladder, ureters), cardiac events, VTE and even death. She understands and agrees to proceed. Consents signed.  - Pre procedure anesthesia appt   - Pre op CT CAP for metastatic evaluation given long interval of reported vaginal bleeding     Jodie Velazquez MD  Gynecologic Oncology       A total of 60 minutes were spent on encounter including record review, imaging review, counseling patient, coordinating future care.

## 2024-02-26 ENCOUNTER — HOSPITAL ENCOUNTER (OUTPATIENT)
Dept: PREADMISSION TESTING | Facility: OTHER | Age: 69
Discharge: HOME | DRG: 741 | End: 2024-02-26
Attending: OBSTETRICS & GYNECOLOGY
Payer: MEDICARE

## 2024-02-26 ENCOUNTER — ANESTHESIA EVENT (OUTPATIENT)
Dept: SURGERY | Facility: OTHER | Age: 69
DRG: 741 | End: 2024-02-26
Payer: MEDICARE

## 2024-02-26 VITALS
WEIGHT: 143.31 LBS | DIASTOLIC BLOOD PRESSURE: 81 MMHG | BODY MASS INDEX: 23.88 KG/M2 | OXYGEN SATURATION: 98 % | SYSTOLIC BLOOD PRESSURE: 155 MMHG | HEIGHT: 65 IN | TEMPERATURE: 98 F | HEART RATE: 71 BPM

## 2024-02-26 DIAGNOSIS — Z01.818 PRE-OP TESTING: Primary | ICD-10-CM

## 2024-02-26 LAB
ABO + RH BLD: NORMAL
BLD GP AB SCN CELLS X3 SERPL QL: NORMAL
OHS QRS DURATION: 72 MS
OHS QTC CALCULATION: 410 MS
SPECIMEN OUTDATE: NORMAL

## 2024-02-26 PROCEDURE — 93010 ELECTROCARDIOGRAM REPORT: CPT | Mod: ,,, | Performed by: INTERNAL MEDICINE

## 2024-02-26 PROCEDURE — 36415 COLL VENOUS BLD VENIPUNCTURE: CPT | Performed by: ANESTHESIOLOGY

## 2024-02-26 PROCEDURE — 93005 ELECTROCARDIOGRAM TRACING: CPT

## 2024-02-26 PROCEDURE — 86901 BLOOD TYPING SEROLOGIC RH(D): CPT | Performed by: ANESTHESIOLOGY

## 2024-02-26 RX ORDER — LIDOCAINE HYDROCHLORIDE 10 MG/ML
0.5 INJECTION, SOLUTION EPIDURAL; INFILTRATION; INTRACAUDAL; PERINEURAL ONCE
Status: CANCELLED | OUTPATIENT
Start: 2024-02-26 | End: 2024-02-26

## 2024-02-26 RX ORDER — SODIUM CHLORIDE, SODIUM LACTATE, POTASSIUM CHLORIDE, CALCIUM CHLORIDE 600; 310; 30; 20 MG/100ML; MG/100ML; MG/100ML; MG/100ML
INJECTION, SOLUTION INTRAVENOUS CONTINUOUS
Status: CANCELLED | OUTPATIENT
Start: 2024-02-26

## 2024-02-26 NOTE — ANESTHESIA PREPROCEDURE EVALUATION
02/26/2024  Caitlyn Grace is a 68 y.o., female.      Pre-op Assessment    I have reviewed the Patient Summary Reports.     I have reviewed the Nursing Notes. I have reviewed the NPO Status.   I have reviewed the Medications.     Review of Systems  Anesthesia Hx:  No previous Anesthesia   History of prior surgery of interest to airway management or planning:          Denies Family Hx of Anesthesia complications.    Denies Personal Hx of Anesthesia complications.                    Social:  Non-Smoker, Social Alcohol Use       Hematology/Oncology:  Hematology Normal                     Current/Recent Cancer.         surgery   Oncology Comments: Endometrial cancer     EENT/Dental:  chronic allergic rhinitis           Cardiovascular:                hyperlipidemia   ECG has been reviewed. Seen by cardiology in past for T wave inversion,told normal for her after echo by Dr Cazares                         Pulmonary:  Pulmonary Normal                       Renal/:  Renal/ Normal                 Hepatic/GI:   PUD,               Musculoskeletal:  Arthritis               Neurological:  Neurology Normal                                      Endocrine:   Hypothyroidism          Dermatological:  Skin Normal    Psych:  Psychiatric Normal                    Physical Exam  General: Cooperative, Alert, Oriented and Anxious    Airway:  Mallampati: II   Mouth Opening: Normal  Neck ROM: Normal ROM    Dental:  Caps / Implants, Intact      Anesthesia Plan  Type of Anesthesia, risks & benefits discussed:    Anesthesia Type: Gen ETT  Intra-op Monitoring Plan: Standard ASA Monitors  Post Op Pain Control Plan: multimodal analgesia  Induction:  IV  Airway Plan: Video  Informed Consent: Informed consent signed with the Patient and all parties understand the risks and agree with anesthesia plan.  All questions answered.   ASA  Score: 2  Anesthesia Plan Notes: Labs in epic,T and S today w EKG    Ready For Surgery From Anesthesia Perspective.     .

## 2024-02-26 NOTE — DISCHARGE INSTRUCTIONS
Information to Prepare you for your Surgery    PRE-ADMIT TESTING -  [phone]    [address] [facility]          Your surgery has been scheduled at [facility]. We are pleased to have the opportunity to serve you. For Further Information please call [phone].    On the day of surgery please report to the Information Desk on the 1st floor.    CONTACT YOUR PHYSICIAN'S OFFICE THE DAY PRIOR TO YOUR SURGERY TO OBTAIN YOUR ARRIVAL TIME.     The evening before surgery do not eat anything after 9 p.m. ( this includes hard candy, chewing gum and mints).  You may only have GATORADE, POWERADE AND WATER  from 9 p.m. until you leave your home.   DO NOT DRINK ANY LIQUIDS ON THE WAY TO THE HOSPITAL.      Why does your anesthesiologist allow you to drink Gatorade/Powerade before surgery?  Gatorade/Powerade helps to increase your comfort before surgery and to decrease your nausea after surgery. The carbohydrates in Gatorade/Powerade help reduce your body's stress response to surgery.  If you are a diabetic-drink only water prior to surgery.    Outpatient Surgery- May allow 2 adult (18 and older) Support Persons (1 being the designated ) for all surgical/procedural patients. A breastfeeding mother will be allowed her infant and 2 adult Support Persons. No one under the age of 18 will be allowed in the building.      SPECIAL MEDICATION INSTRUCTIONS: TAKE medications checked off by the Anesthesiologist on your Medication List.    Angiogram Patients: Take medications as instructed by your physician, including aspirin.     Surgery Patients:    If you take ASPIRIN - Your PHYSICIAN/SURGEON will need to inform you IF/OR when you need to stop taking aspirin prior to your surgery.     The week prior to surgery do not ot take any medications containing IBUPROFEN or NSAIDS ( Advil, Motrin, Goodys, BC, Aleve, Naproxen etc) If you are not sure if you should take a medicine  please call your surgeon's office.  Ok to take Tylenol    Do Not Wear any make-up (especially eye make-up) to surgery. Please remove any false eyelashes or eyelash extensions. If you arrive the day of surgery with makeup/eyelashes on you will be required to remove prior to surgery. (There is a risk of corneal abrasions if eye makeup/eyelash extensions are not removed)      Leave all valuables at home.   Do Not wear any jewelry or watches, including any metal in body piercings. Jewelry must be removed prior to coming to the hospital.  There is a possibility that rings that are unable to be removed may be cut off if they are on the surgical extremity.    Please remove all hair extensions, wigs, clips and any other metal accessories/ ornaments from your hair.  These items may pose a flammable/fire risk in Surgery and must be removed.    Do not shave your surgical area at least 5 days prior to your surgery. The surgical prep will be performed at the hospital according to Infection Control regulations.    Contact Lens must be removed before surgery. Either do not wear the contact lens or bring a case and solution for storage.  Please bring a container for eyeglasses or dentures as required.  Bring any paperwork your physician has provided, such as consent forms,  history and physicals, doctor's orders, etc.   Bring comfortable clothes that are loose fitting to wear upon discharge. Take into consideration the type of surgery being performed.  Maintain your diet as advised per your physician the day prior to surgery.      Adequate rest the night before surgery is advised.   Park in the Parking lot behind the hospital or in the [place] [facility] across the street from the parking lot. Parking is complimentary.  If you will be discharged the same day as your procedure, please arrange for a responsible adult to drive you home or to accompany you if traveling by taxi.   YOU WILL NOT BE PERMITTED TO DRIVE OR TO LEAVE THE  HOSPITAL ALONE AFTER SURGERY.   If you are being discharged the same day, it is strongly recommended that you arrange for someone to remain with you for the first 24 hrs following your surgery.    The Surgeon will speak to your family/visitor after your surgery regarding the outcome of your surgery and post op care.  The Surgeon may speak to you after your surgery, but there is a possibility you may not remember the details.  Please check with your family members regarding the conversation with the Surgeon.    We strongly recommend whoever is bringing you home be present for discharge instructions.  This will ensure a thorough understanding for your post op home care.          Thank you for your cooperation.  The Staff of [facility].            Bathing Instructions with Hibiclens    Shower the evening before and morning of your procedure with Chlorhexidine (Hibiclens)  do not use Chlorhexidine on your face or genitals. Do not get in your eyes.  Wash your face with water and your regular face wash/soap  Use your regular shampoo  Apply Chlorhexidine (Hibiclens) directly on your skin or on a wet washcloth and wash gently. When showering: Move away from the shower stream when applying Chlorhexidine (Hibiclens) to avoid rinsing off too soon.  Rinse thoroughly with warm water  Do not dilute Chlorhexidine (Hibiclens)   Dry off as usual, do not use any deodorant, powder, body lotions, perfume, after shave or cologne.

## 2024-02-27 ENCOUNTER — TELEPHONE (OUTPATIENT)
Dept: GYNECOLOGIC ONCOLOGY | Facility: CLINIC | Age: 69
End: 2024-02-27
Payer: MEDICARE

## 2024-02-27 DIAGNOSIS — C54.1 ENDOMETRIAL CANCER: ICD-10-CM

## 2024-02-27 DIAGNOSIS — C54.1 ENDOMETRIAL CANCER DETERMINED BY UTERINE BIOPSY: Primary | ICD-10-CM

## 2024-02-27 RX ORDER — MUPIROCIN 20 MG/G
OINTMENT TOPICAL
Status: CANCELLED | OUTPATIENT
Start: 2024-02-27

## 2024-02-27 RX ORDER — LIDOCAINE HYDROCHLORIDE 10 MG/ML
1 INJECTION, SOLUTION EPIDURAL; INFILTRATION; INTRACAUDAL; PERINEURAL ONCE
Status: CANCELLED | OUTPATIENT
Start: 2024-02-27 | End: 2024-02-27

## 2024-02-27 RX ORDER — HEPARIN SODIUM 5000 [USP'U]/ML
5000 INJECTION, SOLUTION INTRAVENOUS; SUBCUTANEOUS ONCE
Status: CANCELLED | OUTPATIENT
Start: 2024-02-27

## 2024-02-28 ENCOUNTER — ANESTHESIA (OUTPATIENT)
Dept: SURGERY | Facility: OTHER | Age: 69
DRG: 741 | End: 2024-02-28
Payer: MEDICARE

## 2024-02-28 ENCOUNTER — HOSPITAL ENCOUNTER (INPATIENT)
Facility: OTHER | Age: 69
LOS: 1 days | Discharge: HOME | DRG: 741 | End: 2024-02-28
Attending: OBSTETRICS & GYNECOLOGY | Admitting: OBSTETRICS & GYNECOLOGY
Payer: MEDICARE

## 2024-02-28 DIAGNOSIS — C54.1 ENDOMETRIAL CANCER DETERMINED BY UTERINE BIOPSY: ICD-10-CM

## 2024-02-28 DIAGNOSIS — Z98.890 S/P ROBOT-ASSISTED SURGICAL PROCEDURE: Primary | ICD-10-CM

## 2024-02-28 LAB — POCT GLUCOSE: 104 MG/DL (ref 70–110)

## 2024-02-28 PROCEDURE — 63600175 PHARM REV CODE 636 W HCPCS: Performed by: STUDENT IN AN ORGANIZED HEALTH CARE EDUCATION/TRAINING PROGRAM

## 2024-02-28 PROCEDURE — 63600175 PHARM REV CODE 636 W HCPCS: Performed by: ANESTHESIOLOGY

## 2024-02-28 PROCEDURE — 58571 TLH W/T/O 250 G OR LESS: CPT | Mod: ,,, | Performed by: OBSTETRICS & GYNECOLOGY

## 2024-02-28 PROCEDURE — 88341 IMHCHEM/IMCYTCHM EA ADD ANTB: CPT | Mod: 26,59,, | Performed by: STUDENT IN AN ORGANIZED HEALTH CARE EDUCATION/TRAINING PROGRAM

## 2024-02-28 PROCEDURE — 88305 TISSUE EXAM BY PATHOLOGIST: CPT | Performed by: STUDENT IN AN ORGANIZED HEALTH CARE EDUCATION/TRAINING PROGRAM

## 2024-02-28 PROCEDURE — P9045 ALBUMIN (HUMAN), 5%, 250 ML: HCPCS | Mod: JZ,JG | Performed by: STUDENT IN AN ORGANIZED HEALTH CARE EDUCATION/TRAINING PROGRAM

## 2024-02-28 PROCEDURE — 88305 TISSUE EXAM BY PATHOLOGIST: CPT | Mod: 26,,, | Performed by: STUDENT IN AN ORGANIZED HEALTH CARE EDUCATION/TRAINING PROGRAM

## 2024-02-28 PROCEDURE — 0UT74ZZ RESECTION OF BILATERAL FALLOPIAN TUBES, PERCUTANEOUS ENDOSCOPIC APPROACH: ICD-10-PCS | Performed by: OBSTETRICS & GYNECOLOGY

## 2024-02-28 PROCEDURE — 8E0W4CZ ROBOTIC ASSISTED PROCEDURE OF TRUNK REGION, PERCUTANEOUS ENDOSCOPIC APPROACH: ICD-10-PCS | Performed by: OBSTETRICS & GYNECOLOGY

## 2024-02-28 PROCEDURE — 07BC4ZX EXCISION OF PELVIS LYMPHATIC, PERCUTANEOUS ENDOSCOPIC APPROACH, DIAGNOSTIC: ICD-10-PCS | Performed by: OBSTETRICS & GYNECOLOGY

## 2024-02-28 PROCEDURE — 12000002 HC ACUTE/MED SURGE SEMI-PRIVATE ROOM

## 2024-02-28 PROCEDURE — 88360 TUMOR IMMUNOHISTOCHEM/MANUAL: CPT | Performed by: STUDENT IN AN ORGANIZED HEALTH CARE EDUCATION/TRAINING PROGRAM

## 2024-02-28 PROCEDURE — 58571 TLH W/T/O 250 G OR LESS: CPT | Mod: AS,,, | Performed by: NURSE PRACTITIONER

## 2024-02-28 PROCEDURE — 88342 IMHCHEM/IMCYTCHM 1ST ANTB: CPT | Mod: 59 | Performed by: STUDENT IN AN ORGANIZED HEALTH CARE EDUCATION/TRAINING PROGRAM

## 2024-02-28 PROCEDURE — 37000009 HC ANESTHESIA EA ADD 15 MINS: Performed by: OBSTETRICS & GYNECOLOGY

## 2024-02-28 PROCEDURE — 63600175 PHARM REV CODE 636 W HCPCS: Performed by: OBSTETRICS & GYNECOLOGY

## 2024-02-28 PROCEDURE — 25000003 PHARM REV CODE 250: Performed by: OBSTETRICS & GYNECOLOGY

## 2024-02-28 PROCEDURE — 27201423 OPTIME MED/SURG SUP & DEVICES STERILE SUPPLY: Performed by: OBSTETRICS & GYNECOLOGY

## 2024-02-28 PROCEDURE — 0UT94ZZ RESECTION OF UTERUS, PERCUTANEOUS ENDOSCOPIC APPROACH: ICD-10-PCS | Performed by: OBSTETRICS & GYNECOLOGY

## 2024-02-28 PROCEDURE — 63600175 PHARM REV CODE 636 W HCPCS: Mod: JZ,JG | Performed by: ANESTHESIOLOGY

## 2024-02-28 PROCEDURE — 38900 IO MAP OF SENT LYMPH NODE: CPT | Mod: 50,,, | Performed by: OBSTETRICS & GYNECOLOGY

## 2024-02-28 PROCEDURE — 36000712 HC OR TIME LEV V 1ST 15 MIN: Performed by: OBSTETRICS & GYNECOLOGY

## 2024-02-28 PROCEDURE — 25000003 PHARM REV CODE 250: Performed by: STUDENT IN AN ORGANIZED HEALTH CARE EDUCATION/TRAINING PROGRAM

## 2024-02-28 PROCEDURE — 88112 CYTOPATH CELL ENHANCE TECH: CPT | Mod: 26,,, | Performed by: STUDENT IN AN ORGANIZED HEALTH CARE EDUCATION/TRAINING PROGRAM

## 2024-02-28 PROCEDURE — 71000016 HC POSTOP RECOV ADDL HR: Performed by: OBSTETRICS & GYNECOLOGY

## 2024-02-28 PROCEDURE — 36000713 HC OR TIME LEV V EA ADD 15 MIN: Performed by: OBSTETRICS & GYNECOLOGY

## 2024-02-28 PROCEDURE — 71000033 HC RECOVERY, INTIAL HOUR: Performed by: OBSTETRICS & GYNECOLOGY

## 2024-02-28 PROCEDURE — D9220A PRA ANESTHESIA: Mod: ANES,,, | Performed by: ANESTHESIOLOGY

## 2024-02-28 PROCEDURE — 88309 TISSUE EXAM BY PATHOLOGIST: CPT | Mod: 26,,, | Performed by: STUDENT IN AN ORGANIZED HEALTH CARE EDUCATION/TRAINING PROGRAM

## 2024-02-28 PROCEDURE — 38900 IO MAP OF SENT LYMPH NODE: CPT | Mod: 50,AS,, | Performed by: NURSE PRACTITIONER

## 2024-02-28 PROCEDURE — 51798 US URINE CAPACITY MEASURE: CPT

## 2024-02-28 PROCEDURE — 88341 IMHCHEM/IMCYTCHM EA ADD ANTB: CPT | Performed by: STUDENT IN AN ORGANIZED HEALTH CARE EDUCATION/TRAINING PROGRAM

## 2024-02-28 PROCEDURE — 37000008 HC ANESTHESIA 1ST 15 MINUTES: Performed by: OBSTETRICS & GYNECOLOGY

## 2024-02-28 PROCEDURE — 88307 TISSUE EXAM BY PATHOLOGIST: CPT | Performed by: STUDENT IN AN ORGANIZED HEALTH CARE EDUCATION/TRAINING PROGRAM

## 2024-02-28 PROCEDURE — 88360 TUMOR IMMUNOHISTOCHEM/MANUAL: CPT | Mod: 26,,, | Performed by: STUDENT IN AN ORGANIZED HEALTH CARE EDUCATION/TRAINING PROGRAM

## 2024-02-28 PROCEDURE — 38570 LAPAROSCOPY LYMPH NODE BIOP: CPT | Mod: 51,,, | Performed by: OBSTETRICS & GYNECOLOGY

## 2024-02-28 PROCEDURE — 38570 LAPAROSCOPY LYMPH NODE BIOP: CPT | Mod: AS,51,, | Performed by: NURSE PRACTITIONER

## 2024-02-28 PROCEDURE — 71000015 HC POSTOP RECOV 1ST HR: Performed by: OBSTETRICS & GYNECOLOGY

## 2024-02-28 PROCEDURE — 88307 TISSUE EXAM BY PATHOLOGIST: CPT | Mod: 26,,, | Performed by: STUDENT IN AN ORGANIZED HEALTH CARE EDUCATION/TRAINING PROGRAM

## 2024-02-28 PROCEDURE — 88309 TISSUE EXAM BY PATHOLOGIST: CPT | Performed by: STUDENT IN AN ORGANIZED HEALTH CARE EDUCATION/TRAINING PROGRAM

## 2024-02-28 PROCEDURE — 71000039 HC RECOVERY, EACH ADD'L HOUR: Performed by: OBSTETRICS & GYNECOLOGY

## 2024-02-28 PROCEDURE — 0UT24ZZ RESECTION OF BILATERAL OVARIES, PERCUTANEOUS ENDOSCOPIC APPROACH: ICD-10-PCS | Performed by: OBSTETRICS & GYNECOLOGY

## 2024-02-28 PROCEDURE — 88112 CYTOPATH CELL ENHANCE TECH: CPT | Performed by: STUDENT IN AN ORGANIZED HEALTH CARE EDUCATION/TRAINING PROGRAM

## 2024-02-28 PROCEDURE — 88342 IMHCHEM/IMCYTCHM 1ST ANTB: CPT | Mod: 26,XU,, | Performed by: STUDENT IN AN ORGANIZED HEALTH CARE EDUCATION/TRAINING PROGRAM

## 2024-02-28 PROCEDURE — 0TJB8ZZ INSPECTION OF BLADDER, VIA NATURAL OR ARTIFICIAL OPENING ENDOSCOPIC: ICD-10-PCS | Performed by: OBSTETRICS & GYNECOLOGY

## 2024-02-28 PROCEDURE — D9220A PRA ANESTHESIA: Mod: CRNA,,, | Performed by: NURSE ANESTHETIST, CERTIFIED REGISTERED

## 2024-02-28 RX ORDER — OXYCODONE HYDROCHLORIDE 5 MG/1
5 TABLET ORAL
Status: DISCONTINUED | OUTPATIENT
Start: 2024-02-28 | End: 2024-02-28 | Stop reason: HOSPADM

## 2024-02-28 RX ORDER — LIDOCAINE HYDROCHLORIDE 20 MG/ML
INJECTION INTRAVENOUS
Status: DISCONTINUED | OUTPATIENT
Start: 2024-02-28 | End: 2024-02-28

## 2024-02-28 RX ORDER — ONDANSETRON 4 MG/1
4 TABLET, ORALLY DISINTEGRATING ORAL EVERY 6 HOURS PRN
Qty: 15 TABLET | Refills: 0 | Status: SHIPPED | OUTPATIENT
Start: 2024-02-28 | End: 2024-05-24

## 2024-02-28 RX ORDER — DEXAMETHASONE SODIUM PHOSPHATE 4 MG/ML
INJECTION, SOLUTION INTRA-ARTICULAR; INTRALESIONAL; INTRAMUSCULAR; INTRAVENOUS; SOFT TISSUE
Status: DISCONTINUED | OUTPATIENT
Start: 2024-02-28 | End: 2024-02-28

## 2024-02-28 RX ORDER — ONDANSETRON HYDROCHLORIDE 2 MG/ML
INJECTION, SOLUTION INTRAVENOUS
Status: DISCONTINUED | OUTPATIENT
Start: 2024-02-28 | End: 2024-02-28

## 2024-02-28 RX ORDER — LIDOCAINE HYDROCHLORIDE 10 MG/ML
0.5 INJECTION, SOLUTION EPIDURAL; INFILTRATION; INTRACAUDAL; PERINEURAL ONCE
Status: DISCONTINUED | OUTPATIENT
Start: 2024-02-28 | End: 2024-02-28 | Stop reason: HOSPADM

## 2024-02-28 RX ORDER — AMOXICILLIN 250 MG
1 CAPSULE ORAL DAILY
Qty: 30 TABLET | Refills: 1 | Status: SHIPPED | OUTPATIENT
Start: 2024-02-28 | End: 2024-04-06 | Stop reason: ALTCHOICE

## 2024-02-28 RX ORDER — FENTANYL CITRATE 50 UG/ML
INJECTION, SOLUTION INTRAMUSCULAR; INTRAVENOUS
Status: DISCONTINUED | OUTPATIENT
Start: 2024-02-28 | End: 2024-02-28

## 2024-02-28 RX ORDER — DEXTROMETHORPHAN HYDROBROMIDE, GUAIFENESIN 5; 100 MG/5ML; MG/5ML
650 LIQUID ORAL EVERY 6 HOURS
Qty: 30 TABLET | Refills: 1 | Status: SHIPPED | OUTPATIENT
Start: 2024-02-28 | End: 2024-05-24

## 2024-02-28 RX ORDER — METHYLENE BLUE 5 MG/ML
INJECTION INTRAVENOUS
Status: DISCONTINUED | OUTPATIENT
Start: 2024-02-28 | End: 2024-02-28

## 2024-02-28 RX ORDER — LIDOCAINE HYDROCHLORIDE 10 MG/ML
1 INJECTION, SOLUTION EPIDURAL; INFILTRATION; INTRACAUDAL; PERINEURAL ONCE
Status: DISCONTINUED | OUTPATIENT
Start: 2024-02-28 | End: 2024-02-28 | Stop reason: HOSPADM

## 2024-02-28 RX ORDER — CEFAZOLIN SODIUM 1 G/3ML
2 INJECTION, POWDER, FOR SOLUTION INTRAMUSCULAR; INTRAVENOUS
Status: COMPLETED | OUTPATIENT
Start: 2024-02-28 | End: 2024-02-28

## 2024-02-28 RX ORDER — KETAMINE HCL IN 0.9 % NACL 50 MG/5 ML
SYRINGE (ML) INTRAVENOUS
Status: DISCONTINUED | OUTPATIENT
Start: 2024-02-28 | End: 2024-02-28

## 2024-02-28 RX ORDER — ALBUMIN HUMAN 50 G/1000ML
SOLUTION INTRAVENOUS
Status: DISCONTINUED | OUTPATIENT
Start: 2024-02-28 | End: 2024-02-28

## 2024-02-28 RX ORDER — MIDAZOLAM HYDROCHLORIDE 1 MG/ML
INJECTION INTRAMUSCULAR; INTRAVENOUS
Status: DISCONTINUED | OUTPATIENT
Start: 2024-02-28 | End: 2024-02-28

## 2024-02-28 RX ORDER — TRAMADOL HYDROCHLORIDE 50 MG/1
50 TABLET ORAL EVERY 6 HOURS PRN
Qty: 15 TABLET | Refills: 0 | Status: SHIPPED | OUTPATIENT
Start: 2024-02-28 | End: 2024-04-06 | Stop reason: ALTCHOICE

## 2024-02-28 RX ORDER — ROCURONIUM BROMIDE 10 MG/ML
INJECTION, SOLUTION INTRAVENOUS
Status: DISCONTINUED | OUTPATIENT
Start: 2024-02-28 | End: 2024-02-28

## 2024-02-28 RX ORDER — MEPERIDINE HYDROCHLORIDE 25 MG/ML
12.5 INJECTION INTRAMUSCULAR; INTRAVENOUS; SUBCUTANEOUS ONCE AS NEEDED
Status: DISCONTINUED | OUTPATIENT
Start: 2024-02-28 | End: 2024-02-28 | Stop reason: HOSPADM

## 2024-02-28 RX ORDER — HYDROMORPHONE HYDROCHLORIDE 2 MG/ML
0.4 INJECTION, SOLUTION INTRAMUSCULAR; INTRAVENOUS; SUBCUTANEOUS EVERY 5 MIN PRN
Status: DISCONTINUED | OUTPATIENT
Start: 2024-02-28 | End: 2024-02-28 | Stop reason: HOSPADM

## 2024-02-28 RX ORDER — SODIUM CHLORIDE 0.9 % (FLUSH) 0.9 %
3 SYRINGE (ML) INJECTION
Status: DISCONTINUED | OUTPATIENT
Start: 2024-02-28 | End: 2024-02-28 | Stop reason: HOSPADM

## 2024-02-28 RX ORDER — BUPIVACAINE HYDROCHLORIDE 2.5 MG/ML
INJECTION, SOLUTION EPIDURAL; INFILTRATION; INTRACAUDAL
Status: DISCONTINUED | OUTPATIENT
Start: 2024-02-28 | End: 2024-02-28 | Stop reason: HOSPADM

## 2024-02-28 RX ORDER — PROPOFOL 10 MG/ML
VIAL (ML) INTRAVENOUS
Status: DISCONTINUED | OUTPATIENT
Start: 2024-02-28 | End: 2024-02-28

## 2024-02-28 RX ORDER — ACETAMINOPHEN 10 MG/ML
INJECTION, SOLUTION INTRAVENOUS
Status: DISCONTINUED | OUTPATIENT
Start: 2024-02-28 | End: 2024-02-28

## 2024-02-28 RX ORDER — PROCHLORPERAZINE EDISYLATE 5 MG/ML
5 INJECTION INTRAMUSCULAR; INTRAVENOUS EVERY 30 MIN PRN
Status: DISCONTINUED | OUTPATIENT
Start: 2024-02-28 | End: 2024-02-28 | Stop reason: HOSPADM

## 2024-02-28 RX ORDER — INDOCYANINE GREEN AND WATER 25 MG
KIT INJECTION
Status: DISCONTINUED | OUTPATIENT
Start: 2024-02-28 | End: 2024-02-28 | Stop reason: HOSPADM

## 2024-02-28 RX ORDER — HEPARIN SODIUM 5000 [USP'U]/ML
5000 INJECTION, SOLUTION INTRAVENOUS; SUBCUTANEOUS ONCE
Status: COMPLETED | OUTPATIENT
Start: 2024-02-28 | End: 2024-02-28

## 2024-02-28 RX ORDER — MUPIROCIN 20 MG/G
OINTMENT TOPICAL
Status: DISCONTINUED | OUTPATIENT
Start: 2024-02-28 | End: 2024-02-28 | Stop reason: HOSPADM

## 2024-02-28 RX ORDER — SODIUM CHLORIDE, SODIUM LACTATE, POTASSIUM CHLORIDE, CALCIUM CHLORIDE 600; 310; 30; 20 MG/100ML; MG/100ML; MG/100ML; MG/100ML
INJECTION, SOLUTION INTRAVENOUS CONTINUOUS
Status: DISCONTINUED | OUTPATIENT
Start: 2024-02-28 | End: 2024-02-28 | Stop reason: HOSPADM

## 2024-02-28 RX ORDER — OXYCODONE HYDROCHLORIDE 5 MG/1
5 TABLET ORAL EVERY 4 HOURS PRN
Qty: 10 TABLET | Refills: 0 | Status: SHIPPED | OUTPATIENT
Start: 2024-02-28 | End: 2024-02-28 | Stop reason: HOSPADM

## 2024-02-28 RX ADMIN — HEPARIN SODIUM 5000 UNITS: 5000 INJECTION INTRAVENOUS; SUBCUTANEOUS at 07:02

## 2024-02-28 RX ADMIN — METHYLENE BLUE 50 MG: 5 INJECTION INTRAVENOUS at 11:02

## 2024-02-28 RX ADMIN — FENTANYL CITRATE 25 MCG: 50 INJECTION, SOLUTION INTRAMUSCULAR; INTRAVENOUS at 08:02

## 2024-02-28 RX ADMIN — DEXAMETHASONE SODIUM PHOSPHATE 4 MG: 4 INJECTION, SOLUTION INTRAMUSCULAR; INTRAVENOUS at 08:02

## 2024-02-28 RX ADMIN — ROCURONIUM BROMIDE 10 MG: 10 INJECTION, SOLUTION INTRAVENOUS at 10:02

## 2024-02-28 RX ADMIN — MIDAZOLAM HYDROCHLORIDE 2 MG: 1 INJECTION, SOLUTION INTRAMUSCULAR; INTRAVENOUS at 08:02

## 2024-02-28 RX ADMIN — ROCURONIUM BROMIDE 20 MG: 10 INJECTION, SOLUTION INTRAVENOUS at 08:02

## 2024-02-28 RX ADMIN — LIDOCAINE HYDROCHLORIDE 40 MG: 20 INJECTION, SOLUTION INTRAVENOUS at 08:02

## 2024-02-28 RX ADMIN — ACETAMINOPHEN 1000 MG: 10 INJECTION, SOLUTION INTRAVENOUS at 11:02

## 2024-02-28 RX ADMIN — MUPIROCIN: 20 OINTMENT TOPICAL at 07:02

## 2024-02-28 RX ADMIN — FENTANYL CITRATE 50 MCG: 50 INJECTION, SOLUTION INTRAMUSCULAR; INTRAVENOUS at 08:02

## 2024-02-28 RX ADMIN — HYDROMORPHONE HYDROCHLORIDE 0.4 MG: 2 INJECTION INTRAMUSCULAR; INTRAVENOUS; SUBCUTANEOUS at 12:02

## 2024-02-28 RX ADMIN — Medication 10 MG: at 08:02

## 2024-02-28 RX ADMIN — ALBUMIN (HUMAN) 500 ML: 12.5 SOLUTION INTRAVENOUS at 10:02

## 2024-02-28 RX ADMIN — ROCURONIUM BROMIDE 20 MG: 10 INJECTION, SOLUTION INTRAVENOUS at 09:02

## 2024-02-28 RX ADMIN — PROCHLORPERAZINE EDISYLATE 5 MG: 5 INJECTION INTRAMUSCULAR; INTRAVENOUS at 12:02

## 2024-02-28 RX ADMIN — Medication 10 MG: at 10:02

## 2024-02-28 RX ADMIN — ROCURONIUM BROMIDE 10 MG: 10 INJECTION, SOLUTION INTRAVENOUS at 11:02

## 2024-02-28 RX ADMIN — SODIUM CHLORIDE, SODIUM LACTATE, POTASSIUM CHLORIDE, AND CALCIUM CHLORIDE: 600; 310; 30; 20 INJECTION, SOLUTION INTRAVENOUS at 10:02

## 2024-02-28 RX ADMIN — ROCURONIUM BROMIDE 50 MG: 10 INJECTION, SOLUTION INTRAVENOUS at 08:02

## 2024-02-28 RX ADMIN — Medication 20 MG: at 08:02

## 2024-02-28 RX ADMIN — SODIUM CHLORIDE, SODIUM LACTATE, POTASSIUM CHLORIDE, AND CALCIUM CHLORIDE: 600; 310; 30; 20 INJECTION, SOLUTION INTRAVENOUS at 07:02

## 2024-02-28 RX ADMIN — ONDANSETRON HYDROCHLORIDE 4 MG: 2 INJECTION INTRAMUSCULAR; INTRAVENOUS at 11:02

## 2024-02-28 RX ADMIN — PROPOFOL 50 MG: 10 INJECTION, EMULSION INTRAVENOUS at 10:02

## 2024-02-28 RX ADMIN — SUGAMMADEX 200 MG: 100 INJECTION, SOLUTION INTRAVENOUS at 11:02

## 2024-02-28 RX ADMIN — PROPOFOL 175 MG: 10 INJECTION, EMULSION INTRAVENOUS at 08:02

## 2024-02-28 RX ADMIN — CEFAZOLIN 2 G: 330 INJECTION, POWDER, FOR SOLUTION INTRAMUSCULAR; INTRAVENOUS at 08:02

## 2024-02-28 NOTE — ANESTHESIA PROCEDURE NOTES
Intubation    Date/Time: 2/28/2024 8:12 AM    Performed by: Sherry Abbott CRNA  Authorized by: Gino Crawford MD    Intubation:     Induction:  Intravenous    Intubated:  Postinduction    Mask Ventilation:  Easy with oral airway    Attempts:  1    Attempted By:  CRNA    Method of Intubation:  Video laryngoscopy    Blade:  Raygoza 3    Laryngeal View Grade: Grade I - full view of cords      Difficult Airway Encountered?: No      Complications:  None    Airway Device:  Oral endotracheal tube    Airway Device Size:  7.5    Style/Cuff Inflation:  Cuffed    Tube secured:  20    Secured at:  The lips    Placement Verified By:  Capnometry    Complicating Factors:  None    Findings Post-Intubation:  BS equal bilateral and atraumatic/condition of teeth unchanged

## 2024-02-28 NOTE — DISCHARGE INSTRUCTIONS
Discharge Instructions for Laparoscopic Hysterectomy  You had a procedure called laparoscopic hysterectomy. A surgeon removed your uterus using instruments inserted through small incisions in your abdomen. These incisions may be tender or sore. You may also have pain in your upper back or shoulders. This is from the gas used to enlarge your abdomen to allow your doctor to see inside your pelvis and perform the procedure. This pain usually goes away in a day or two. It usually takes from 1-4 weeks to recover from laparoscopic hysterectomy. Remember, though, that recovery time varies from woman to woman. Here's what you can do to speed your recovery following surgery.    Home Care   Continue the coughing and deep breathing exercises that you learned in the hospital.  Take your medications exactly as directed by your doctor.  Avoid constipation.  Eat fruits, vegetables, and whole grains.  Drink 6-8 glasses of water a day, unless told to do otherwise.  Use a laxative or a mild stool softener if your doctor says it's okay.    You may shower in 24 hours and get your incisions wet. Dab your incisions dry with a clean towel.  It is OK if the pieces of tape come off, (steri strips). If they are still in place 1 week after surgery, you should take them off.  You should call your doctor if the incisions become reddened or have foul smelling or bloody drainage. Avoid baths, swimming pools and hot tubs until seen by your physician for a post-op follow up.    Don't use oils, powders, or lotions on your incisions.  You should not have any sexual activity for six weeks.  This can cause severe bleeding. You should not insert anything into the vagina for six weeks, no tampons or douching.  If you had both ovaries removed, report hot flashes, mood swings, and irritability to your doctor. There may be medications that can help you.    Activity  Ask your doctor when you can start driving again. It's usually okay to drive as soon  as you are free of pain and able to move comfortably from side to side. Don't drive while you are still taking narcotic pain medications.  Ask others to help with chores and errands while you recover.  Dont lift anything heavier than 10 pounds for 4 weeks.  Dont vacuum or do other strenuous activities until the doctor says it's okay.  Walk as often as you feel able.  Climb stairs slowly and pause after every few steps.    Follow-Up  Make a follow-up appointment as directed by our staff.     When to Call Your Doctor  Call your doctor right away if you have any of the following:  Fever above 100.4°F (38°C) or chills  Bright red vaginal bleeding or vaginal bleeding that soaks more than one sanitary pad per hour  A foul smelling discharge from the vagina  Trouble urinating or burning when you urinate  Severe pain or bloating in your abdomen  Redness, swelling, or drainage at your incision sites  Shortness of breath or chest pain       Anesthesia: After Your Surgery  Youve just had surgery. During surgery, you received medication called anesthesia to keep you comfortable and pain-free. After surgery, you may experience some pain or nausea. This is common. Here are some tips for feeling better and recovering after surgery.    Going home  Your doctor or nurse will show you how to take care of yourself when you go home. He or she will also answer your questions. Have an adult family member or friend drive you home. For the first 24 hours after your surgery:  Do not drive or use heavy equipment.  Do not make important decisions or sign legal documents.  Avoid alcohol.  Have someone stay with you, if needed. He or she can watch for problems and help keep you safe.  Take your time getting up from a seated or lying position. You may experience dizziness for 24 hours  Be sure to keep all follow-up appointments with your doctor. And rest after your procedure for as long as your doctor tells you to.    Coping with pain  If you  have pain after surgery, pain medication will help you feel better. Take it as directed, before pain becomes severe. Also, ask your doctor or pharmacist about other ways to control pain, such as with heat, ice, and relaxation. And follow any other instructions your surgeon or nurse gives you.    URINARY RETENTION  Should you experience a decrease in your urine output or are unable to urinate following surgery, this can be due to the medications given during surgery.  We recommend you going to the nearest Emergency Department.    Tips for taking pain medication  To get the best relief possible, remember these points:  Pain medications can upset your stomach. Taking them with a little food may help.  Most pain relievers taken by mouth need at least 20 to 30 minutes to take effect.  Taking medication on a schedule can help you remember to take it. Try to time your medication so that you can take it before beginning an activity, such as dressing, walking, or sitting down for dinner.  Constipation is a common side effect of pain medications. Contact your doctor before taking any medications like laxatives or stool softeners to help relieve constipation. Also ask about any dietary restrictions, because drinking lots of fluids and eating foods like fruits and vegetables that are high in fiber can also help. Remember, dont take laxatives unless your surgeon has prescribed them.  Mixing alcohol and pain medication can cause dizziness and slow your breathing. It can even be fatal. Dont drink alcohol while taking pain medication.  Pain medication can slow your reflexes. Dont drive or operate machinery while taking pain medication.  If your health care provider tells you to take acetaminophen to help relieve your pain, ask him or her how much you are supposed to take each day. (Acetaminophen is the generic name for Tylenol and other brand-name pain relievers.) Acetaminophen or other pain relievers may interact with your  prescription medicines or other over-the-counter (OTC) drugs. Some prescription medications contain acetaminophen along with other active ingredients. Using both prescription and OTC acetaminophen for pain can cause you to overdose. The FDA recommends that you read the labels on your OTC medications carefully. This will help you to clearly understand the list of active ingredients, dosing instructions, and any warnings. It may also help you avoid taking too much acetaminophen. If you have questions or don't understand the information, ask your pharmacist or health care provider to explain it to you before you take the OTC medication.    Managing nausea  Some people have an upset stomach after surgery. This is often due to anesthesia, pain, pain medications, or the stress of surgery. The following tips will help you manage nausea and get good nutrition as you recover. If you were on a special diet before surgery, ask your doctor if you should follow it during recovery. These tips may help:  Dont push yourself to eat. Your body will tell you when to eat and how much.  Start off with clear liquids and soup. They are easier to digest.  Progress to semi-solid foods (mashed potatoes, applesauce, and gelatin) as you feel ready.  Slowly move to solid foods. Dont eat fatty, rich, or spicy foods at first.  Dont force yourself to have three large meals a day. Instead, eat smaller amounts more often.  Take pain medications with a small amount of solid food, such as crackers or toast to avoid nausea.      Call your surgeon if    You feel too sleepy, dizzy, or groggy (medication may be too strong).  You have side effects like nausea, vomiting, or skin changes (rash, itching, or hives).   © 4515-4601 [employer]. [address] [place] [address], [place] [address]. All rights reserved. This information is not intended as a substitute for professional medical care. Always follow your healthcare professional's  instructions.

## 2024-02-28 NOTE — DISCHARGE SUMMARY
[facility]  Discharge Note      Admit Date: 2/28/2024    Discharge Date: 02/28/2024    Attending Physician: Elías Velazquez MD     Surgery Date: 2/28/2024     Surgeon(s) and Role:     * Elías Velazquez MD - Primary     * Sherry Rothman MD - Resident - Assisting     * Thao Fowler MD - Resident - Assisting    Pre-op Diagnosis:  Endometrial cancer [C54.1]    Post-op Diagnosis:  Post-Op Diagnosis Codes:     * Endometrial cancer [C54.1]    Procedure(s) (LRB):  XI ROBOTIC HYSTERECTOMY (N/A)  XI ROBOTIC SALPINGO-OOPHORECTOMY (Bilateral)  MAPPING, LYMPH NODE, SENTINEL (Bilateral)  BIOPSY, LYMPH NODE (Bilateral)  CYSTOSCOPY    Anesthesia: General    Estimated Blood Loss: * No values recorded between 2/28/2024  8:25 AM and 2/28/2024 12:01 PM *         Specimens:   Specimen (24h ago, onward)       Start     Ordered    02/28/24 1143  Cytology, Fluid/Wash/Brush  Once        Question Answer Comment   Source: Peritoneal/abdominal/pelvic wash    Clinical Information: n/a    Specific Site: pelvic    Other Requests: n/a    Release to patient Immediate        02/28/24 1143    02/28/24 1143  Specimen to Pathology, Surgery Gynecology and Obstetrics  Once        Comments: Pre-op Diagnosis: Endometrial cancer [C54.1]Procedure(s):XI ROBOTIC HYSTERECTOMYXI ROBOTIC SALPINGO-OOPHORECTOMYMAPPING, LYMPH NODE, SENTINELBIOPSY, LYMPH NODE Number of specimens: 3Name of specimens: 1.Right common illac sentinel lymph node2.Left obturator lymph nodes3. Uterus, cervix, bilateral tubes, ovaries     References:    Click here for ordering Quick Tip   Question Answer Comment   Procedure Type: Gynecology and Obstetrics    Specimen Class: Known or suspected malignancy    Which provider would you like to cc? ELÍAS VELAZQUEZ    Release to patient Immediate        02/28/24 1143                    Discharge Provider: Sherry Rothman    Diagnoses:  Active Hospital Problems    Diagnosis  POA    *S/P robot-assisted TLH/BSO/SLND/cystoscopy  [Z98.890]  Not Applicable      Resolved Hospital Problems   No resolved problems to display.       Discharged Condition: good    Hospital Course:   Patient was admitted for outpatient procedure as above, and tolerated the procedure well with no complications. Please see operative report for further details. Following the procedure, the patient was awakened from anesthesia and transferred to the recovery area in stable condition. She was discharged to home once ambulating, voiding, tolerating PO intake, and pain was well-controlled. Patient was given routine post-op instructions and prescriptions for pain medication to take as needed. Patient instructed to follow up with Dr. Velazquez for post op visit.    Final Diagnoses: Same as principal problem.    Disposition: Home or Self Care    Follow up/Patient Instructions:    Medications:  Reconciled Home Medications:      Medication List        START taking these medications      acetaminophen 650 MG Tbsr  Commonly known as: TYLENOL  Take 1 tablet (650 mg total) by mouth every 6 (six) hours.     ondansetron 4 MG Tbdl  Commonly known as: ZOFRAN-ODT  Take 1 tablet (4 mg total) by mouth every 6 (six) hours as needed (nausea/vomiting).     STIMULANT LAXATIVE PLUS 8.6-50 mg per tablet  Generic drug: senna-docusate 8.6-50 mg  Take 1 tablet by mouth once daily.     traMADoL 50 mg tablet  Commonly known as: ULTRAM  Take 1 tablet (50 mg total) by mouth every 6 (six) hours as needed for Pain.            CONTINUE taking these medications      Lactobacillus rhamnosus GG 10 billion cell capsule  Commonly known as: CULTURELLE  Take 1 capsule by mouth once daily.     NUFOLA ORAL  Take by mouth once daily.     OCUVITE ORAL  Take by mouth.     UNABLE TO FIND  Take 120 mg by mouth once daily. NP Thyroid     UNABLE TO FIND  once daily. lumity     UNABLE TO FIND  once daily. dosokap     UNABLE TO FIND  once daily. omaprem     UNABLE TO FIND  once daily. adrecor     UNABLE TO FIND  once daily.  Dim evail            STOP taking these medications      estradioL 0.075 mg/24 hr  Commonly known as: VIVELLE-DOT     progesterone 200 MG capsule  Commonly known as: PROMETRIUM            Discharge Procedure Orders   Diet general     Lifting restrictions   Order Comments: No lifting greater than 15 pounds for six weeks.     Other restrictions (specify):   Order Comments: PELVIC REST (IF YOU HAD A HYSTERECTOMY):  No douching, tampons, or intercourse for 6 weeks.      DRIVING:  No driving while on narcotics. Driving may be resumed initially with a competent passenger one to two weeks after surgery if no longer taking narcotics.     EXERCISE:  For six weeks your exercise should be limited to walking. You may walk as far as you wish, as long as you increase your level of exertion gradually and avoid slippery surfaces. You may climb stairs as needed to get around, but should not use stair climbing for exercise.     Remove dressing in 24 hours   Order Comments: If you have a bandage on wound, you may remove it the day after dismissal.  If you had steri-strips remove them once they begin to peel off (usually 2 weeks).  If your steri-strips still haven't come off in 2 weeks, please remove them.  Keep incision clean and dry.  Inspect the incision daily for signs and symptoms of infection.     Wound care routine (specify)   Order Comments: WOUND CARE:  If you have a band-aid or bandage on your wound, you may remove it the day after dismissal.  You may wash the wound with mild soap and water.   You may shower at any time but should avoid immersing any abdominal incisions in water for at least two weeks after surgery or until the wound is completely healed. If given, please shower with Hibiclens soap until bottle is completely finished. Keep your wound clean and dry.  You should observe your incision for signs of infection which include redness, warmth, drainage or fever.     Call MD for:  temperature >100.4     Call MD for:   persistent nausea and vomiting     Call MD for:  severe uncontrolled pain     Call MD for:  difficulty breathing, headache or visual disturbances     Call MD for:  redness, tenderness, or signs of infection (pain, swelling, redness, odor or green/yellow discharge around incision site)     Call MD for:  hives     Call MD for:   Order Comments: inability to void,urine is ketchup colored or you have large clots, vaginal bleeding is heavier than a period.    VAGINAL DISCHARGE: You may develop a vaginal discharge and intermittent vaginal spotting after surgery and up to 6 weeks postoperatively.  The discharge may have an odor and may change in color but it is normal.  This is due to dissolving stiches.  Contact your surgical team if you develop vaginal or vulvar irritation along with a discharge.  Also contact your surgical team if you have vaginal discharge that smells like urine or stool.    CONSTIPATION REMEDIES: Patients are often constipated after surgery or with use of oral narcotic medicine. You should continue to take the stool softener, Senokot-S during the next six weeks, and consume adequate amounts of water.  If you have not had a bowel movement for 3 days after dismissal, or are uncomfortable and unable to pass stool, please try one or all of the following measures:  1.  Milk of Magnesia - 30 cc by mouth every 12 hours   2.  Dulcolax suppository - One suppository per rectum every 4-6 hours   3.  Metamucil, Fibercon or other bulk former - use as directed  4.  Fleets Enema    PAIN MEDICATIONS:     Take your pain medications as instructed. It is best to take pain medications before your pain becomes severe. This will allow you to take less medication yet have better pain relief. For the first 2 or 3 days it may be helpful to take your pain medications on a regular schedule (e.g. every 4 to 6 hours). This will help you to keep your pain under better control. You should then begin to take fewer medications each  day until you no longer need them. Do not take pain medication on an empty stomach. This may lead to nausea and vomiting.     Activity as tolerated   Order Comments: PELVIC REST:  No douching, tampons, or intercourse for 6 weeks.   DRIVING:  No driving while on narcotics. Driving may be resumed initially with a competent passenger one to two weeks after surgery if no longer taking narcotics.     EXERCISE:  For six weeks your exercise should be limited to walking. You may walk as far as you wish, as long as you increase your level of exertion gradually and avoid slippery surfaces. You may climb stairs as needed to get around, but should not use stair climbing for exercise.     Shower on day dressing removed (No bath)   Order Comments: You may shower at any time but should avoid immersing any abdominal incisions in water for at least 2 weeks after surgery or until the wound is completely healed.  If given, please shower with Hibaclens soap until bottle is completely finish      Follow-up Information       Jodie Velazquez MD. Go to.    Specialty: Gynecologic Oncology  Why: Post op visit  Contact information:  [address]  [facility] [id number] [phone]                            Sherry Rothman MD  PGY-3 OB/GYN

## 2024-02-28 NOTE — ANESTHESIA POSTPROCEDURE EVALUATION
Anesthesia Post Evaluation    Patient: Caitlyn Grace    Procedure(s) Performed: Procedure(s) (LRB):  XI ROBOTIC HYSTERECTOMY (N/A)  XI ROBOTIC SALPINGO-OOPHORECTOMY (Bilateral)  MAPPING, LYMPH NODE, SENTINEL (Bilateral)  BIOPSY, LYMPH NODE (Bilateral)  CYSTOSCOPY    Final Anesthesia Type: general      Patient location during evaluation: PACU  Patient participation: Yes- Able to Participate  Level of consciousness: awake and alert  Post-procedure vital signs: reviewed and stable  Pain management: adequate  Airway patency: patent    PONV status at discharge: No PONV  Anesthetic complications: no      Cardiovascular status: blood pressure returned to baseline  Respiratory status: unassisted, spontaneous ventilation and room air  Hydration status: euvolemic  Follow-up not needed.          Vitals Value Taken Time   BP 93/52 02/28/24 1302   Temp 36.7 °C (98 °F) 02/28/24 1205   Pulse 67 02/28/24 1315   Resp 16 02/28/24 1300   SpO2 95 % 02/28/24 1315   Vitals shown include unvalidated device data.      Event Time   Out of Recovery 13:15:43         Pain/Maria Dolores Score: Pain Rating Prior to Med Admin: 7 (2/28/2024 12:47 PM)  Pain Rating Post Med Admin: 4 (2/28/2024  1:14 PM)  Maria Dolores Score: 10 (2/28/2024  1:14 PM)

## 2024-02-28 NOTE — TRANSFER OF CARE
"Anesthesia Transfer of Care Note    Patient: Caitlyn Grace    Procedure(s) Performed: Procedure(s) (LRB):  XI ROBOTIC HYSTERECTOMY (N/A)  XI ROBOTIC SALPINGO-OOPHORECTOMY (Bilateral)  MAPPING, LYMPH NODE, SENTINEL (Bilateral)  BIOPSY, LYMPH NODE (Bilateral)  CYSTOSCOPY    Patient location: PACU    Anesthesia Type: general    Transport from OR: Transported from OR on 6-10 L/min O2 by face mask with adequate spontaneous ventilation    Post pain: adequate analgesia    Post assessment: no apparent anesthetic complications and tolerated procedure well    Post vital signs: stable    Level of consciousness: awake    Nausea/Vomiting: nausea and no vomiting    Complications: none    Transfer of care protocol was followed      Last vitals: Visit Vitals  /73   Pulse 73   Temp 36.7 °C (98 °F) (Skin)   Ht 5' 5" (1.651 m)   Wt 65 kg (143 lb 4.8 oz)   LMP 02/10/2014   SpO2 95%   Breastfeeding No   BMI 23.85 kg/m²     "

## 2024-02-28 NOTE — PLAN OF CARE
Caitlyn Grace has met all discharge criteria from Phase II. Vital Signs are stable, ambulating  without difficulty. Discharge instructions given, patient verbalized understanding. Discharged from facility via wheelchair in stable condition.

## 2024-02-28 NOTE — OP NOTE
DATE OF PROCEDURE:  02/28/2024     SURGEON:  Jodie Velazquez MD      ASSISTANTS:    Charley Ahumada who served as first assist as no qualified resident available   Thao Fowler observing at bedside  Sherry Rothman at console      PREOPERATIVE DIAGNOSES:   Grade 2 Endometrial Cancer     POSTOPERATIVE DIAGNOSES: Same      PROCEDURES:  Robotic-assisted laparoscopic hysterectomy and bilateral salpingo-oophorectomy, bilateral sentinel lymph node mapping and biopsies. Cystoscopy.     COMPLICATIONS: None     ESTIMATED BLOOD LOSS: 50 cc     URINE OUTPUT: 200cc    ANESTHESIA: GETA    INDICATIONS: Caitlyn Grace  is a 68 y.o. . female who presented to Gynecologic Oncology clinic with a new diagnosis of Grade 2 Endometrial Cancer diagnosed on  biopsy performed by her primary Gynecologist. She was counseled that the standard of care for this was surgical resection and staging and on all associated risks and agreed to proceed.      INTRAOPERATIVE FINDINGS: Normal vagina. Normal appearing cervix. 8 cm uterus with no apparent spread of disease outside of the uterus.  She had normal bilateral tubes and ovaries.  Bilateral sentinel lymph node mapping successful to left obturator node and right common iliac node.      PROCEDURE IN DETAIL: Informed consent was obtained and the patient was taken to  the Operating Suite.  General anesthesia was administered.  Once felt to be adequate, she was placed in dorsal lithotomy position with her arms tucked.  The abdomen and pelvis were prepped and draped in the usual fashion. Time out was performed. Ancef was administered as pre op antibiotic. A speculum was placed in the vagina.  The cervix was visualized, grasped with a  single-tooth tenaculum. The cervix was injected with Indocyanine green at 3 and 9 oclock at 1mm and 1cm depth. EEA sizer placed in vagina.  Cardoso catheter was placed to gravity drainage and   attention was turned to the abdominal portion of the procedure. Al's point  injected with local anesthesia and then a small incision was made with the 11 blade knife. Veress needle was placed and opening pressure was 7.  Pneumoperitoneum was obtained with carbon dioxide and once this was felt to be adequate, an 5mm laparoscope with a 8mm robotic port was placed through  incision for optiview entry. Abdomen inspected and above noted findings were present. Right and left  Lateral robotic trocars x 3 were  placed through 8 mm incisions.  A right upper quadrant 12 mm AirSeal accessory port was placed. Maldonado loaiza used to place a 0 vicryl around fascial incision and tagged for closure at the end. The patient was placed in steep Trendelenburg.  The robot was docked and instruments were passed in the operative field.  Attention was first turned to the right side where the right round ligament was transected after sacrificing with bipolar cautery.  The anterior and posterior leaflets of the broad ligament were opened.The pararectal space was developed by retracting the ureter medially. The paravesical space was developed.  At this point we transitioned to near infra red mode and noted successful mapping to common iliac artery node. Node was elevated from surrounding structures paying close attention to common iliac artery medially, ureter laterally  and vena cava inferiorly. Node was cauterized and transected as necessary. lymphatic channels were cauterized and transected. Once node was completely free from surrounding attachments it was passed through the 12 port in a endocatch bag. We then proceed to the left where the retroperitoneum was opened in an identical manner. Successful mapping was noted to the obturator node bed. Node was elevated from surrounding structures paying close attention to external iliac vein, internal iliac artery and obturator nerve. It was  cauterized and transected as necessary. Lymphatic channels were cauterized and transected. Once node was completely free from  surrounding attachments it was passed through the 12 port  in a endocatch bag. Next, I proceeded with hysterectomy. The ureter was identified again bilaterally and a window was made beneath the infundibulopelvic ligament.  This was sacrificed with  bipolar cautery and transected.  The medial fold of the peritoneum was then taken to the  level of the uterine artery.   Anteriorly, the vesicouterine peritoneum was  incised and the bladder was dissected off of cervix and upper vagina. Uterine arteries were then skeletonized bilaterally, cauterized and transected using bipolar followed by monopolar energy. Pedicles were then lateralized using bipolar followed by monopolor energy. Next, Circumferential colpotomy was completed starting anteriorly and the uterus, cervix,  bilateral tubes and ovaries were removed. The uterus was opened on back table and endometrial tumor noted.  The cuff was then closed with a 0 Vlock suture.  Cystoscopy confirmed intact bladder and efflux from bilateral ureteral orifices. The pelvis was irrigated with 1L normal saline and noted to be hemostatic. Fibrillar agent was applied in the lymph node dissection beds and along vaginal cuff.  Once hemostasis was confirmed, the instruments were removed, the robot was undocked. The  fascia of the 12mm airseal assist port using0 vicryl  that had been previously placed. The pneumoperitoneum was evacuated.  The patient was flattened.  All ports were removed and port sites were inspected and made hemostatic with electrocautery and closed with subcuticular 4-0 Monocryl suture.  Dermabond was applied and the patient was awoken and taken to Recovery Room in stable  condition.  Of note, I was present for and performed all key aspects of  procedure.        Jodie Velazquez MD  Gynecologic Oncology

## 2024-02-28 NOTE — INTERVAL H&P NOTE
The patient has been examined and the H&P has been reviewed:    I concur with the findings and no changes have occurred since H&P was written.    Surgery risks, benefits and alternative options discussed and understood by patient/family.    Caitlyn Grace is 68 y.o.  presenting for scheduled RA-TLH/BSO/SLNB.    Temp:  [98.2 °F (36.8 °C)] 98.2 °F (36.8 °C)  Pulse:  [73] 73  SpO2:  [95 %] 95 %  BP: (131)/(73) 131/73    General: NAD, alert, oriented, cooperative  HEENT: NCAT, EOM grossly intact  Lungs: Normal WOB  Heart: regular rate    Consents in chart. Pre-operative heparin given at 0718 . All questions answered and concerns addressed. To OR for planned procedure.       Sherry Rothman MD  PGY-3 OB/GYN

## 2024-02-29 VITALS
HEIGHT: 65 IN | DIASTOLIC BLOOD PRESSURE: 72 MMHG | HEART RATE: 78 BPM | BODY MASS INDEX: 23.88 KG/M2 | RESPIRATION RATE: 16 BRPM | OXYGEN SATURATION: 97 % | TEMPERATURE: 98 F | SYSTOLIC BLOOD PRESSURE: 133 MMHG | WEIGHT: 143.31 LBS

## 2024-03-04 ENCOUNTER — NURSE TRIAGE (OUTPATIENT)
Dept: ADMINISTRATIVE | Facility: CLINIC | Age: 69
End: 2024-03-04
Payer: MEDICARE

## 2024-03-04 ENCOUNTER — TELEPHONE (OUTPATIENT)
Dept: GYNECOLOGIC ONCOLOGY | Facility: CLINIC | Age: 69
End: 2024-03-04
Payer: MEDICARE

## 2024-03-04 NOTE — TELEPHONE ENCOUNTER
Spoke with pt who reports that she had recent surgery with OBGYN. States that she has been having sinus issues since before surgery, but reports that she is having HA, also reports that she noted red spot  to shin of left leg. That she reports is itchy. Advised to be seen today or tomorrow in office. Pt states she want to be sure its ok to be seen by ENT regarding sinus issue. Advised will send message to office.      Reason for Disposition   Sinus congestion (pressure, fullness) present > 10 days    Additional Information   Negative: Sounds like a life-threatening emergency to the triager   Negative: Difficulty breathing, and not from stuffy nose (e.g., not relieved by cleaning out the nose)   Negative: SEVERE headache and has fever   Negative: Patient sounds very sick or weak to the triager   Negative: SEVERE sinus pain   Negative: SEVERE headache   Negative: Redness or swelling on the cheek, forehead, or around the eye   Negative: Fever > 103 F (39.4 C)   Negative: Fever > 101 F (38.3 C) and over 60 years of age   Negative: Fever > 100.0 F (37.8 C) and has diabetes mellitus or a weak immune system (e.g., HIV positive, cancer chemotherapy, organ transplant, splenectomy, chronic steroids)   Negative: Fever > 100.0 F (37.8 C) and bedridden (e.g., CVA, chronic illness, recovering from surgery)   Negative: Fever present > 3 days (72 hours)   Negative: Fever returns after gone for over 24 hours and symptoms worse or not improved   Negative: Sinus pain (not just congestion) and fever   Negative: Earache    Protocols used: Sinus Pain or Congestion-A-[place]

## 2024-03-05 LAB
COMMENT: ABNORMAL
FINAL PATHOLOGIC DIAGNOSIS: ABNORMAL
Lab: ABNORMAL

## 2024-03-11 ENCOUNTER — TELEPHONE (OUTPATIENT)
Dept: GYNECOLOGIC ONCOLOGY | Facility: CLINIC | Age: 69
End: 2024-03-11
Payer: MEDICARE

## 2024-03-11 NOTE — TELEPHONE ENCOUNTER
Discussed results with patient.     ----- Message from Inna Bailey sent at 3/11/2024 10:29 AM CDT -----  Regarding: Test results  Type: Patient Call Back    Who called:    What is the request in detail: p/t is calling to discuss test results. Please call to assist.     Can the clinic reply by [facility]? No     Would the patient rather a call back or a response via [facility]?     Best call back number:  [phone]    Additional Information:

## 2024-03-22 ENCOUNTER — TUMOR BOARD CONFERENCE (OUTPATIENT)
Dept: GYNECOLOGIC ONCOLOGY | Facility: CLINIC | Age: 69
End: 2024-03-22
Payer: MEDICARE

## 2024-03-22 NOTE — PROGRESS NOTES
Multidisciplinary Gynecologic Oncology Cancer Conference - Evaluation and Recommendation Summary  Patient Name: Caitlyn Grace  : 1955  MRN: [id number]     1. Evaluation  HPI: 68 year old presented with grade 2 endometrioid endometrial ca =diagnosed with EMBx.    RALH BSO SLND on 2024   Pathology:   Right common iliac sentinel lymph nodes- negative (0/1)   Left obturatior sentinel lymph nodes- negative (0/3)   Uterus, bilateral fallopian tubes and ovaries-Endometrial carcinoma, figo 2. Tumor size 3.5cm.        Tumor Size Greatest Dimension (3.5cm)    Histologic Type: Endometrioid carcinoma,    Histologic Grade: FIGO grade 2   Myometrial Invasion: Not identified   Adenomyosis: present, involved by carcinoma   Uterine Serosa Involvement: Not identified   Lower Uterine Segment Involvement:  Not identified   Cervical Stromal Involvement       Not identified   Lymphovascular Invasion (LVI):       Not identified     Pathology:confirming that there is no LVSI or myometrial invasion?       2. Treatment Recommendation     Consensus opinion, after pathology review and secondary review with additional pathologists, favors a Figo 2. Final pathology sendout to [facility] confirms stage 1A, Gr 2. Recommendation is for observation/routine surveillance.

## 2024-03-28 LAB
FINAL PATHOLOGIC DIAGNOSIS: NORMAL
GROSS: NORMAL
Lab: NORMAL
SUPPLEMENTAL DIAGNOSIS: NORMAL

## 2024-04-01 ENCOUNTER — OFFICE VISIT (OUTPATIENT)
Dept: GYNECOLOGIC ONCOLOGY | Facility: CLINIC | Age: 69
End: 2024-04-01
Payer: MEDICARE

## 2024-04-01 VITALS
BODY MASS INDEX: 22.43 KG/M2 | DIASTOLIC BLOOD PRESSURE: 80 MMHG | HEIGHT: 66 IN | SYSTOLIC BLOOD PRESSURE: 158 MMHG | HEART RATE: 84 BPM | WEIGHT: 139.56 LBS

## 2024-04-01 DIAGNOSIS — N95.1 MENOPAUSAL SYMPTOM: ICD-10-CM

## 2024-04-01 DIAGNOSIS — C54.1 ENDOMETRIAL CANCER: Primary | ICD-10-CM

## 2024-04-01 DIAGNOSIS — Z98.890 S/P ROBOT-ASSISTED SURGICAL PROCEDURE: ICD-10-CM

## 2024-04-01 PROCEDURE — 99214 OFFICE O/P EST MOD 30 MIN: CPT | Mod: PBBFAC | Performed by: OBSTETRICS & GYNECOLOGY

## 2024-04-01 PROCEDURE — 99459 PELVIC EXAMINATION: CPT | Mod: PBBFAC | Performed by: OBSTETRICS & GYNECOLOGY

## 2024-04-01 PROCEDURE — 99999 PR PBB SHADOW E&M-EST. PATIENT-LVL IV: CPT | Mod: PBBFAC,,, | Performed by: OBSTETRICS & GYNECOLOGY

## 2024-04-01 PROCEDURE — 99024 POSTOP FOLLOW-UP VISIT: CPT | Mod: POP,,, | Performed by: OBSTETRICS & GYNECOLOGY

## 2024-04-01 NOTE — PROGRESS NOTES
GYN ONC     SUBJECTIVE:     Chief Complaint:  Post Op EMCA    History of Present Illness:  Caitlyn Grace is a 68 y.o. female who is now 4 weeks post op from a RATLH BSO SLNB for Grade 2 EMCA. She is doing well. Reports she is tolerating PO without issues. Having normal bowel and bladder function. Denies any abnormal vaginal bleeding, discharge or odor.      Oncology History   Endometrial cancer   2/28/2024 Surgery    RATLH BSO SLNB   Grade 2 Endometrioid Adenocarcinoma, Superficial myometrial invasion (<50%), No LVSI   MMR: intact  P53: wild type  [place] Lymph Nodes: NEGATIVE   [place] Review:  COMMENT   Thank you for the opportunity to participate in this patient's care. I agree with your diagnosis of FIGO grade 2 endometrioid adenocarcinoma of the endometrium. Your p53 immunostain demonstrates a wild type staining pattern in the tumor. The tumor   involves   adenomyosis but I believe there is evidence of superficial myoinvasion on slide 3D; the extent of myoinvasion does not appear to be sufficient to upstage the tumor. In the absence of cervical involvement or involvement of lymph nodes or other organs,   this would still be   compatible with stage IA regardless of whether there is superficial myoinvasion (less than 50% of the myometrial thickness) or not.        2/28/2024 Initial Diagnosis    Endometrial cancer        Review of Systems:  Review of Systems per HPI      OBJECTIVE:     Physical Exam:  Physical Exam  Vitals reviewed. Exam conducted with a chaperone present.   Constitutional:       Appearance: Normal appearance.   Cardiovascular:      Rate and Rhythm: Normal rate.   Pulmonary:      Effort: Pulmonary effort is normal.   Abdominal:      General: Abdomen is flat.      Palpations: Abdomen is soft.      Comments: Incisions clean dry intact well healing    Genitourinary:     Vagina: Normal.      Comments: Vaginal cuff healing well. No signs of infection or separation.  Neurological:      Mental  Status: She is alert.       ASSESSMENT:       ICD-10-CM ICD-9-CM    1. Endometrial cancer  C54.1 182.0 Ambulatory referral/consult to [facility]      Ambulatory referral/consult to Integrative Oncology      2. S/P robot-assisted surgical procedure  Z98.890 V45.89       3. Menopausal symptom  N95.1 627.2          Plan:      Post Op  Healing well. Continue pelvic rest until 8 weeks post operative.     Stage IA Grade 2 Endometroid Adenocarcinoma of the Uterus. No LVSI. Negative sentinel  lymph nodes. MMR intact.   She is low Risk by GOG 99 and PORTEC Criteria and risk of recurrence is <5%; thus, no adjuvant therapy is warranted.  Plan is for Observation. Follow up every 6 months with pelvic exams.   Discussed signs and symptoms of recurrence and when to seek care between exams. Patient expressed understanding and stated all questions had been answered.     Referral to women's wellness and integrative oncology for assistance with menopausal symptoms with goals of improving her quality of life.   RTC 6 months first surveillance visit       Jodie Velazquez MD  Gynecologic Oncology

## 2024-04-02 ENCOUNTER — TELEPHONE (OUTPATIENT)
Dept: HEMATOLOGY/ONCOLOGY | Facility: CLINIC | Age: 69
End: 2024-04-02
Payer: MEDICARE

## 2024-04-02 NOTE — TELEPHONE ENCOUNTER
Spoke to pt. in regards to scheduling integrative consultation, referral placed by Dr. Velazquez. Pt. approved and confirmed scheduling for Wednesday @2 4:00PM with Joanna Dubinsky, PA-C.  MA advised pt. That this is a consultation visit whereby the provider will review pt's overall health and all services offered by Integrative Oncology. MA informed pt that clinic is located on the 3rd floor of the [facility]. Pt acknowledged.       [place], [place] ext [phone]

## 2024-04-03 ENCOUNTER — TELEPHONE (OUTPATIENT)
Dept: HEMATOLOGY/ONCOLOGY | Facility: CLINIC | Age: 69
End: 2024-04-03

## 2024-04-03 ENCOUNTER — OFFICE VISIT (OUTPATIENT)
Dept: HEMATOLOGY/ONCOLOGY | Facility: CLINIC | Age: 69
End: 2024-04-03
Payer: MEDICARE

## 2024-04-03 VITALS
DIASTOLIC BLOOD PRESSURE: 86 MMHG | HEART RATE: 85 BPM | SYSTOLIC BLOOD PRESSURE: 136 MMHG | BODY MASS INDEX: 24.13 KG/M2 | HEIGHT: 64 IN | OXYGEN SATURATION: 96 % | WEIGHT: 141.31 LBS

## 2024-04-03 DIAGNOSIS — M79.641 BILATERAL HAND PAIN: ICD-10-CM

## 2024-04-03 DIAGNOSIS — M79.642 BILATERAL HAND PAIN: ICD-10-CM

## 2024-04-03 DIAGNOSIS — E78.5 HLD (HYPERLIPIDEMIA): ICD-10-CM

## 2024-04-03 DIAGNOSIS — C54.1 ENDOMETRIAL CANCER: ICD-10-CM

## 2024-04-03 DIAGNOSIS — R51.9 CHRONIC HEADACHES: ICD-10-CM

## 2024-04-03 DIAGNOSIS — G89.29 CHRONIC LOW BACK PAIN: ICD-10-CM

## 2024-04-03 DIAGNOSIS — M54.59 OTHER LOW BACK PAIN: ICD-10-CM

## 2024-04-03 DIAGNOSIS — M54.50 CHRONIC LOW BACK PAIN: ICD-10-CM

## 2024-04-03 DIAGNOSIS — C54.1 ENDOMETRIAL CANCER: Primary | ICD-10-CM

## 2024-04-03 DIAGNOSIS — G89.29 CHRONIC HEADACHES: ICD-10-CM

## 2024-04-03 PROCEDURE — 99214 OFFICE O/P EST MOD 30 MIN: CPT | Mod: PBBFAC | Performed by: PHYSICIAN ASSISTANT

## 2024-04-03 PROCEDURE — 99999 PR PBB SHADOW E&M-EST. PATIENT-LVL IV: CPT | Mod: PBBFAC,,, | Performed by: PHYSICIAN ASSISTANT

## 2024-04-03 PROCEDURE — 99215 OFFICE O/P EST HI 40 MIN: CPT | Mod: S$PBB,,, | Performed by: PHYSICIAN ASSISTANT

## 2024-04-03 NOTE — PROGRESS NOTES
Integrative Health and Medicine Initial Visit      Chief Complaint:  hot flashes and multiple symptoms after endometrial cancer treatment    HPI: Patient is 67 y/o FEMALE with history of endometrial cancer, referred by Dr. Velazquez after treatment to Integrative Oncology, she presents with her . No adjuvant therapy recommended.  Patient had to go off hormonal therapy when diagnosed with cancer and now has multiple symptoms.   Patient complains of insomnia, fatigue, intermittent headaches, arthritis in her hands, hip pain, low back pain and sinus issues, for which she has already seen ENT.   She does not snore, per  but sometimes he looks over to see her gasp/struggle to breathe, which they attributed to sinus issues.  She is most concerned with her sleep, waking up multiple times.  She wakes up at 7 to take her thyroid medication. She also has a history of osteopenia.  Patient has been seeing Dr. Kadi Patel at [facility]. [facility] q6m. Hx of Bio-identical hormone replacement.   Patient states right now her activity includes walking and housework, she is interested in OT/yoga.  Patient has an appointment with the Integrative Nutritionist and consult to acupuncture has already been placed.  Patient and  are going to [place] to spend time with family and will be back in a month.  Has an upcoming appointment with Dr. Galvez.  Patient shares her supplement list, many of which are not available in med list drop down.   She is taking lumity, ocuvite, AdreCor, Nufola, Probiotic XL, [id number], Probiotic XL, DosoKap, OmaPrem, Pregnenolone.  She does not have the doses on these.  She was also taking OsteoForce Supreme, but had not been taking.       Cancer/Stage/TNM:   Cancer Staging   Endometrial cancer  Staging form: Corpus Uteri - Carcinoma and Carcinosarcoma, AJCC 8th Edition  - Clinical stage from 3/20/2024: FIGO Stage IA (cT1a, cN0(sn), cM0) - Unsigned       Oncology History   Endometrial cancer    2/28/2024 Surgery    [facility] BSO SLNB   Grade 2 Endometrioid Adenocarcinoma, Superficial myometrial invasion (<50%), No LVSI   MMR: intact  P53: wild type  [place] Lymph Nodes: NEGATIVE   [place] Review:  COMMENT   Thank you for the opportunity to participate in this patient's care. I agree with your diagnosis of FIGO grade 2 endometrioid adenocarcinoma of the endometrium. Your p53 immunostain demonstrates a wild type staining pattern in the tumor. The tumor   involves   adenomyosis but I believe there is evidence of superficial myoinvasion on slide 3D; the extent of myoinvasion does not appear to be sufficient to upstage the tumor. In the absence of cervical involvement or involvement of lymph nodes or other organs,   this would still be   compatible with stage IA regardless of whether there is superficial myoinvasion (less than 50% of the myometrial thickness) or not.        2/28/2024 Initial Diagnosis    Endometrial cancer           Past Medical History:   Diagnosis Date    Abnormal ECG 05/02/2016 5/2/2016: Anterior T wave inversion. Normal stress echo 2016 Dr Grossman    Allergic rhinitis     ENT DR Meade, flonase bid    Bilateral hand pain 04/05/2022    Bunion of right foot 04/25/2019    MTP R 1st, [place]    Chronic gastric ulcer with hemorrhage 04/05/2022    Due to NSAIDS for L shoudler , sacroiliitis    Chronic left shoulder pain 04/18/2018    Fever blister 04/18/2018    Gallbladder sludge 04/2014    general surgeon Dr [facility]x Paige [facility]    Gastric polyps     EGD 2015 Dr Brian    Hormone replacement therapy 04/05/2022    Postmenopausal vaginal bleeding 04/18/2018    Endometrial biopsy & tx by GYN Marv Smith 04/18/2018    Topical metronidazole    Seasonal allergic rhinitis due to pollen 04/25/2019    flonase daily, allergy testing in past, ENT Vaibhav    Seronegative spondyloarthropathy 05/02/2016    10/2015: Diagnosed.    Subclinical iodine-deficiency hypothyroidism 04/05/2022         Current Outpatient Medications   Medication Instructions    acetaminophen (TYLENOL) 650 mg, Oral, Every 6 hours    beta-carotene,A,-vits C,E/mins (OCUVITE ORAL) Oral    Lactobacillus rhamnosus GG (CULTURELLE) 10 billion cell capsule 1 capsule, Oral, Daily    ondansetron (ZOFRAN-ODT) 4 mg, Oral, Every 6 hours PRN    senna-docusate 8.6-50 mg (SENNA WITH DOCUSATE SODIUM) 8.6-50 mg per tablet 1 tablet, Oral, Daily    traMADoL (ULTRAM) 50 mg, Oral, Every 6 hours PRN    UNABLE TO FIND 120 mg, Oral, Daily, NP Thyroid    UNABLE TO FIND Daily, lumity    UNABLE TO FIND Daily, dosokap    UNABLE TO FIND Daily, omaprem    UNABLE TO FIND Daily, adrecor    UNABLE TO FIND Daily, Dim evail    vit B6/L. mefolate/me-B12/ALA (NUFOLA ORAL) Oral, Daily        Past Surgical History:   Procedure Laterality Date    APPENDECTOMY      laparoscopic same time as breezy    CHOLECYSTECTOMY      CYSTOSCOPY  2/28/2024    Procedure: CYSTOSCOPY;  Surgeon: Jodie Velazquez MD;  Location: [facility] OR;  Service: OB/GYN;;    LYMPH NODE BIOPSY Bilateral 2/28/2024    Procedure: BIOPSY, LYMPH NODE;  Surgeon: Jodie Velazquez MD;  Location: [facility] OR;  Service: OB/GYN;  Laterality: Bilateral;    MAPPING, LYMPH NODE, SENTINEL Bilateral 2/28/2024    Procedure: MAPPING, LYMPH NODE, SENTINEL;  Surgeon: Jodie Velazquez MD;  Location: [facility] OR;  Service: OB/GYN;  Laterality: Bilateral;    ROBOT-ASSISTED LAPAROSCOPIC ABDOMINAL HYSTERECTOMY USING DA DULCE MARIA XI N/A 2/28/2024    Procedure: XI ROBOTIC HYSTERECTOMY;  Surgeon: Jodie Velazquez MD;  Location: [facility] OR;  Service: OB/GYN;  Laterality: N/A;  2 hr case    ROBOT-ASSISTED LAPAROSCOPIC SALPINGO-OOPHORECTOMY USING DA DULCE MARIA XI Bilateral 2/28/2024    Procedure: XI ROBOTIC SALPINGO-OOPHORECTOMY;  Surgeon: Jodie Velazquez MD;  Location: [facility] OR;  Service: OB/GYN;  Laterality: Bilateral;    SALIVARY GLAND SURGERY      R parotid, August 2012, Dr Meade ENT    SURGICAL REMOVAL OF BONE SPUR            Distress Score:   1    7  Pillars Assessment      Sleep  How many hours of sleep per night? 5-6 hours  Do you have trouble falling asleep, staying asleep or waking up earlier than you need to? yes  Do you have daytime fatigue? no  Do you need medication for sleep? no  Do you use any supplements or other interventions for sleep? yes    Resilience  Rate your current level of stress- high  How do you manage stress?  exercise    Purpose  Do you feel you have a vision or a life purpose? Yes    Environment  Any exposures:no known exposures    Spirituality-  not discussed this visit     Nutrition   Food allergies or sensitivities: no  Do you adhere to a particular type of diet? no  What type of diet do you follow? Low sugar  Do you have any concerns with your eating habits? no  Are you concerned with your level of alcohol intake? no    Exercise  How would you describe your physical activity level? mod  Do you work at a sedentary job? no  What do you do for physical activity? walking      Physical Exam   LMP 02/10/2014    Wt Readings from Last 3 Encounters:   04/01/24 63.3 kg (139 lb 8.8 oz)   02/26/24 65 kg (143 lb 4.8 oz)   02/26/24 65 kg (143 lb 4.8 oz)     Temp Readings from Last 3 Encounters:   02/28/24 98 °F (36.7 °C) (Skin)   02/26/24 98.4 °F (36.9 °C) (Oral)   01/17/24 98.3 °F (36.8 °C) (Oral)     BP Readings from Last 3 Encounters:   04/01/24 (!) 158/80   02/28/24 133/72   02/26/24 (!) 155/81     Pulse Readings from Last 3 Encounters:   04/01/24 84   02/28/24 78   02/26/24 71       Body mass index is There is no height or weight on file to calculate BMI.    Vitals reviewed.   Constitutional:       General: Patient is not in acute distress.     Appearance: Normal appearance.   HENT:      Head: Normocephalic and atraumatic.  Pulmonary:      Effort: Pulmonary effort is normal.       Review of Systems:   Cardiac:           No SOB, chest pain with exertion,edema, orthopnea  Distress:          No excessive sadness, no hopelessness, no anhedonia,  "+anxiety   Cognitive:        +brain fog  Fatigue:           Energy level adequate, performing ADL's, no morning fatigue                           Fatigue  3  / 10  ( Scale 0 - 10)   Hormonal:       + hot flashes, no night sweats  Pain:                Has pain,  location:                          Pain 2  / 10 (Scale 0 - 10)    Neuropathy:    No numbness, no tingling, no paresthesia   Sleep:              Difficulty falling asleep, waking up in night, no daytime sleepiness, no snoring  Altered function:          No problems with money management,  no problems with daily organization & planning  Weight:           No concerns with weight, wants to lose a little and maintain healthy        Labs:   Lab Results   Component Value Date    WBC 10.28 02/19/2024    HGB 15.3 02/19/2024    HCT 46.7 02/19/2024    MCV 86 02/19/2024     02/19/2024           No results found for: "HGBA1C"         Assessment:   Patient is a 68 y.o.female who arrived to Integrative Oncology for consult s/p endometrial cancer treatment and going off HRT.       Plan   #Nutrition: Encourage plant-forward anti-inflammatory diet with plenty of protein, discussed the importance of protein in maintaining muscle mass belinda in osteopenia, she is set up to meet with Dietician  # Sleep: Recommend 6-8 hours of restful sleep nightly; recommend strong sleep hygiene routine one hour prior to bed (no screens).  Guided Imagery.  Magnesium glycinate 200-400 mg (for sleep and hot flashes).  Also can try melatonin 1-3 mg nightly.  # Exercise: Recommend 60 minutes of gentle movement/light activity per day (cleaning, walking, cooking, gardening, stationary bike) at baseline and, if can tolerate, build up to at least 150 minutes (2 ½ hours) of moderate-intensity exercise weekly. Two to three resistance-based (strength) exercise sessions (eg: lifting weights, therabands or using own body weight) over each week if can tolerate.  Resistance exercise is important for " maintaining muscle mass and preventing advancing of osteopenia  # Acupuncture--hot flashes, pain, anxiety   # OT/yoga--hot flashes, pain, anxiety  #Offered Psychology and PT as well as classes and other resources  #Supplements--encouraged patient to resume taking Vitamin D/Calcium for the osteopenia.  Reviewed the supplements, these supplements are recommended by another provider, encouraged her to reach out to this provider to consider going off some of these to see if contributing to her side effects.  She does get labs every 6 months and will bring a copy of these labs to her follow up visit with Dr. Galvez  #Follow-Up: with Dr. Galvez as scheduled     Face to Face Time With Patient: 55 min   I spent a total of 70 minutes on the day of the visit.This includes face to face time and non-face to face time preparing to see the patient (eg, review of tests), obtaining and/or reviewing separately obtained history, documenting clinical information in the electronic or other health record, independently interpreting results and communicating results to the patient/family/caregiver, or care coordinator.

## 2024-04-03 NOTE — NURSING
TONE Anne phoned regarding Integrative Oncology referral and appointment     [facility] BSO SL[facility] for Grade 2 EMCA. No adjuvant therapy recommended.    Hopes to address current symptoms of:   + Heat Intolerance  + Insomnia  + Fatigue  + Chronic Headaches  + BL Arthritis of the Hands   Saw Rheumatologist > 5 years ago  + Hip pain  + Chronic Low Back Pain  + Sinusitis  + Osteopenia    Dr. Kadi Patel at [facility]. Labs q6m. Hx of Bio-identical hormone replacement, bringing supplement visit list to 4pm visit with IntOnc today.    Diet:    Balanced, avoids sugar, indulges seldomly   Hx HLD/ Hx Gastric Ulcer   Virtual w/Integrative Dietitian 4/24/24    Abnormal mammo 5/13/23.     Desires to schedule acupuncture for symptoms above.     Patient will address menopausal symptoms and HRT for heart/bone health w/Dr. Galvez 5/24/24. TONE Knight.

## 2024-04-04 ENCOUNTER — PATIENT MESSAGE (OUTPATIENT)
Dept: GYNECOLOGIC ONCOLOGY | Facility: CLINIC | Age: 69
End: 2024-04-04
Payer: MEDICARE

## 2024-04-04 DIAGNOSIS — R91.8 PULMONARY NODULES: Primary | ICD-10-CM

## 2024-04-04 NOTE — PROGRESS NOTES
Referral pulm for micro nodules.   Called pt, no answer but left VM detailing that I want her to see pulm for previously seen micro nodules and recommendations for following.

## 2024-04-22 NOTE — PROGRESS NOTES
The patient location is: [place] (separate state licensure not required, provider meets practice guidelines for this state)  The chief complaint leading to consultation is: hot flashes, endometrial cancer     Visit type: audiovisual    Face to Face time with patient: 64 minutes   75 minutes of total time spent on the encounter, which includes face to face time and non-face to face time preparing to see the patient (eg, review of tests), Obtaining and/or reviewing separately obtained history, Documenting clinical information in the electronic or other health record, Independently interpreting results (not separately reported) and communicating results to the patient/family/caregiver, or Care coordination (not separately reported).     Each patient to whom he or she provides medical services by telemedicine is:  (1) informed of the relationship between the physician and patient and the respective role of any other health care provider with respect to management of the patient; and (2) notified that he or she may decline to receive medical services by telemedicine and may withdraw from such care at any time.    Oncology Nutrition Assessment for Medical Nutrition Therapy  Initial Visit    Caitlyn Grace   1955    Referring Provider:  Jodie Velazquez MD      Reason for Visit: Pt in for education and nutrition counseling     PMHx:   Past Medical History:   Diagnosis Date    Abnormal ECG 05/02/2016 5/2/2016: Anterior T wave inversion. Normal stress echo 2016 Dr Grossman    Allergic rhinitis     ENT DR Meade, flonase bid    Bilateral hand pain 04/05/2022    Bunion of right foot 04/25/2019    MTP R 1st, [place]    Chronic gastric ulcer with hemorrhage 04/05/2022    Due to NSAIDS for L shoudler , sacroiliitis    Chronic left shoulder pain 04/18/2018    Fever blister 04/18/2018    Gallbladder sludge 04/2014    general surgeon Dr Maxx Paige at [facility]    Gastric polyps     EGD 2015 Dr Snehal Leung  "replacement therapy 04/05/2022    Postmenopausal vaginal bleeding 04/18/2018    Endometrial biopsy & tx by GYN Marv Smith 04/18/2018    Topical metronidazole    Seasonal allergic rhinitis due to pollen 04/25/2019    flonase daily, allergy testing in past, ENT Vaibhav    Seronegative spondyloarthropathy 05/02/2016    10/2015: Diagnosed.    Subclinical iodine-deficiency hypothyroidism 04/05/2022       Nutrition Assessment    Patient is a 68 y.o.female with Grade 2 Endometrioid Adenocarcinoma s/p [facility] BSO SLNB  on 2/28/24. No adjuvant treatment recommenced.  Referred to nutrition through integrative oncology to help with risk reduction, gut health, osteopenia.   She is trying to take a proactive approach to health. She wakes up every 2-3 hours during the night, not sleeping well since surgery. Initially had headaches (possible hormone withdrawals) but this has improved. Hot flashes were initially intense, but now she just "runs hot."  Has some muscle and joint pain. She is walking daily. Drinks a lot of water. Concerned she might not be eating enough protein. Has had issues with hypoglycemia in the past.   Breakfast (11/11:30am)- oatmeal with apple and peanut butter, cinnamon and coffee, sometimes yogurt    Afternoon- coffee with non fat milk, caramel   Sometimes a turkey cheese roll up   Dinner- chicken, starch, vegetable   9pm snack (not daily)- chips  Drinks- water, coffee, wine (1 glass with dinner but not nightly)      Weight:64 kg (141 lb)  Height:5' 4" (1.626 m)  BMI:Body mass index is 24.2 kg/m².   IBW: Ideal body weight: 54.7 kg (120 lb 9.5 oz)  Adjusted ideal body weight: 58.4 kg (128 lb 12.1 oz)    Allergies: Clindamycin, Doxycycline, Nsaids (non-steroidal anti-inflammatory drug), Percocet [oxycodone-acetaminophen], and Sulfa (sulfonamide antibiotics)    Current Medications:    Current Outpatient Medications:     acetaminophen (TYLENOL) 650 MG TbSR, Take 1 tablet (650 mg total) by mouth every 6 " (six) hours., Disp: 30 tablet, Rfl: 1    beta-carotene,A,-vits C,E/mins (OCUVITE ORAL), Take by mouth., Disp: , Rfl:     Lactobacillus acidophilus (PROBIOTIC ORAL), Take 12.5 Billion Cells by mouth Daily. 1 capsule a day., Disp: , Rfl:     ondansetron (ZOFRAN-ODT) 4 MG TbDL, Take 1 tablet (4 mg total) by mouth every 6 (six) hours as needed (nausea/vomiting). (Patient not taking: Reported on 4/3/2024), Disp: 15 tablet, Rfl: 0    PREGNENOLONE, BULK, MISC, by Misc.(Non-Drug; Combo Route) route., Disp: , Rfl:     UNABLE TO FIND, Take 120 mg by mouth once daily. NP Thyroid, Disp: , Rfl:     UNABLE TO FIND, once daily. lumity, Disp: , Rfl:     UNABLE TO FIND, once daily. dosokap, Disp: , Rfl:     UNABLE TO FIND, once daily. omaprem, Disp: , Rfl:     UNABLE TO FIND, once daily. adrecor, Disp: , Rfl:     vit B6/L. mefolate/me-B12/ALA (NUFOLA ORAL), Take by mouth once daily., Disp: , Rfl:     Vitamins/Supplements: lumity, ocuvite, AdreCor, Nufola, Probiotic XL, [id number], Probiotic XL, DosoKap, OmaPrem     Labs: Reviewed from the past 6 months     Nutrition Diagnosis    Problem: nutrition related knowledge deficit   Etiology (related to): lack of prior need for nutrition education  Signs/Symptoms (as evidenced by): new cancer diagnosis and self reported diet questions/concerns     Nutrition Intervention    Nutrition Prescription   1600 Kcals (25kcal/kg)  64 g protein (1g/kg)   7557-7759 mL fluid (25-30mL/kg)    Recommendations:  Lot of overlap with B vitamins especially B12  -review with MD that prescribed them  200mg magnesium glycinate at night   -can increase to 400mg if needed   Better protein bars   Anti-inflammatory diet   -reviewed in length   High protein snack midday   Healthier snack in the evening   -popcorn, roasted chick pea, edamame   Whole soy foods ok 1-2 times daily   May benefit from hyaluronic acid supplement     Materials Provided/Reviewed   Hot flash food triggers   Recipe sites     Nutrition Monitoring  and Evaluation    Monitor: diet tolerance  and diet education needs     Goals: improve diet quality and symptom control     Follow up Patient will follow up via portal as needed with any questions/concerns     Communication to referring provider/care team: note available in chart     Counseling time: 1 Hour    Laina Deshpande, MPH, RD, , LDN, FAND  Board Certified Specialist in Oncology Nutrition   [phone]

## 2024-04-24 ENCOUNTER — CLINICAL SUPPORT (OUTPATIENT)
Dept: HEMATOLOGY/ONCOLOGY | Facility: CLINIC | Age: 69
End: 2024-04-24
Payer: MEDICARE

## 2024-04-24 VITALS — BODY MASS INDEX: 24.07 KG/M2 | WEIGHT: 141 LBS | HEIGHT: 64 IN

## 2024-04-24 DIAGNOSIS — C54.1 ENDOMETRIAL CANCER: ICD-10-CM

## 2024-04-24 DIAGNOSIS — R23.2 HOT FLASHES: ICD-10-CM

## 2024-04-24 DIAGNOSIS — Z71.3 NUTRITIONAL COUNSELING: Primary | ICD-10-CM

## 2024-04-24 DIAGNOSIS — E78.5 HLD (HYPERLIPIDEMIA): ICD-10-CM

## 2024-04-24 PROCEDURE — 97802 MEDICAL NUTRITION INDIV IN: CPT | Mod: 95,,, | Performed by: DIETITIAN, REGISTERED

## 2024-04-25 ENCOUNTER — PATIENT MESSAGE (OUTPATIENT)
Dept: HEMATOLOGY/ONCOLOGY | Facility: CLINIC | Age: 69
End: 2024-04-25
Payer: MEDICARE

## 2024-05-06 ENCOUNTER — TELEPHONE (OUTPATIENT)
Dept: GYNECOLOGIC ONCOLOGY | Facility: CLINIC | Age: 69
End: 2024-05-06
Payer: MEDICARE

## 2024-05-06 NOTE — TELEPHONE ENCOUNTER
I attempted to return pt call and obtain more information. LM to call clinic or message through the patient portal.  Charley Ahumada, MARCOP-C, AOP  Gynecologic Oncology    ----- Message from Hellen Norman RN sent at 5/6/2024  4:06 PM CDT -----  Regarding: RE: call back  I've attempted to call on two occasions.  ----- Message -----  From: Charley Ahumada NP  Sent: 5/6/2024   3:57 PM CDT  To: Jodie Velazquez MD; Hellen Norman RN  Subject: RE: call back                                    Please call her. We need more information as to why she went to ED and why she was prescribed Eliquis.  Thanks  ----- Message -----  From: Hellen Norman RN  Sent: 5/6/2024   3:33 PM CDT  To: Charley Ahumada NP  Subject: FW: call back                                    Please advise.  I see she has a follow up scheduled in October, not sure if something sooner is required.  ----- Message -----  From: Lily Martha Amanda  Sent: 5/6/2024   3:12 PM CDT  To: [facility] Staff  Subject: call back                                        Type: Patient Call Back    Who called: pt     What is the request in detail: requesting a call to discuss an Eliquis rx she was given by ED provider, was told by staff to f/u with her oncologist regarding the medication; is currently not [place] the state, would like to schedule an appt if necessary once she returns      Can the clinic reply by [facility]?no    Would the patient rather a call back or a response via [facility]? call    Best call back number: [phone]     Additional Information: would prefer a call tomorrow if possible

## 2024-05-06 NOTE — TELEPHONE ENCOUNTER
----- Message from Charley Ahumada NP sent at 5/6/2024  3:56 PM CDT -----  Regarding: RE: call back  Please call her. We need more information as to why she went to ED and why she was prescribed Eliquis.  Thanks  ----- Message -----  From: Hellen Norman RN  Sent: 5/6/2024   3:33 PM CDT  To: Charley Ahumada NP  Subject: FW: call back                                    Please advise.  I see she has a follow up scheduled in October, not sure if something sooner is required.  ----- Message -----  From: Martha Bautista  Sent: 5/6/2024   3:12 PM CDT  To: [facility] Staff  Subject: call back                                        Type: Patient Call Back    Who called: pt     What is the request in detail: requesting a call to discuss an Eliquis rx she was given by ED provider, was told by staff to f/u with her oncologist regarding the medication; is currently not in the state, would like to schedule an appt if necessary once she returns      Can the clinic reply by [facility]?no    Would the patient rather a call back or a response via [facility]? call    Best call back number: [phone]     Additional Information: would prefer a call tomorrow if possible

## 2024-05-07 ENCOUNTER — TELEPHONE (OUTPATIENT)
Dept: GYNECOLOGIC ONCOLOGY | Facility: CLINIC | Age: 69
End: 2024-05-07
Payer: MEDICARE

## 2024-05-07 NOTE — TELEPHONE ENCOUNTER
"F/U with pt ED visit.  Per pt, "I had clots in my lungs and was told to call Dr Velazquez to let her know.  They also started me on Eliquis and I was wondering who would manage that."  Pt advised to move up Pulmonary appt set for June.  Also advised to be sure PCP is aware of all new health conditions, especially the ED visit, and any new medications started.  PA notified.  "

## 2024-05-08 ENCOUNTER — TELEPHONE (OUTPATIENT)
Dept: PULMONOLOGY | Facility: CLINIC | Age: 69
End: 2024-05-08
Payer: MEDICARE

## 2024-05-08 NOTE — TELEPHONE ENCOUNTER
----- Message from Wanda Luo sent at 5/8/2024  2:48 PM CDT -----  Regarding: Sooner Appt Request  Contact: [phone]  Patient is calling to get appt scheduled for 6/3/24 rescheduled to a sooner appt if possible. Patient would like to be scheduled on 5/23 if possible.  Please give pt a call to further discuss why she stated and to possibly get her scheduled sooner.

## 2024-05-08 NOTE — TELEPHONE ENCOUNTER
----- Message from Wanda Luo sent at 5/8/2024  2:48 PM CDT -----  Regarding: Sooner Appt Request  Contact: [phone]  Patient is calling to get appt scheduled for 6/3/24 rescheduled to a sooner appt if possible. Patient would like to be scheduled on 5/23 if possible.  Please give pt a call to further discuss why she stated and to possibly get her scheduled sooner.

## 2024-05-08 NOTE — TELEPHONE ENCOUNTER
Spoke with pt regarding appt on 6/3/24 at 8am, gave her an earlier appt w/  but pt declined and stated that she would just like to keep her appt with . pt verbalized understanding .

## 2024-05-21 ENCOUNTER — PATIENT MESSAGE (OUTPATIENT)
Dept: PRIMARY CARE CLINIC | Facility: CLINIC | Age: 69
End: 2024-05-21
Payer: MEDICARE

## 2024-05-21 DIAGNOSIS — M79.642 BILATERAL HAND PAIN: Primary | ICD-10-CM

## 2024-05-21 DIAGNOSIS — M79.641 BILATERAL HAND PAIN: Primary | ICD-10-CM

## 2024-05-24 ENCOUNTER — OFFICE VISIT (OUTPATIENT)
Dept: HEMATOLOGY/ONCOLOGY | Facility: CLINIC | Age: 69
End: 2024-05-24
Payer: MEDICARE

## 2024-05-24 ENCOUNTER — LAB VISIT (OUTPATIENT)
Dept: LAB | Facility: HOSPITAL | Age: 69
End: 2024-05-24
Attending: OBSTETRICS & GYNECOLOGY
Payer: MEDICARE

## 2024-05-24 ENCOUNTER — TELEPHONE (OUTPATIENT)
Dept: HEMATOLOGY/ONCOLOGY | Facility: CLINIC | Age: 69
End: 2024-05-24

## 2024-05-24 VITALS
WEIGHT: 137.38 LBS | OXYGEN SATURATION: 100 % | BODY MASS INDEX: 22.89 KG/M2 | HEART RATE: 100 BPM | HEIGHT: 65 IN | SYSTOLIC BLOOD PRESSURE: 120 MMHG | DIASTOLIC BLOOD PRESSURE: 68 MMHG

## 2024-05-24 DIAGNOSIS — I26.99 PULMONARY EMBOLISM, UNSPECIFIED CHRONICITY, UNSPECIFIED PULMONARY EMBOLISM TYPE, UNSPECIFIED WHETHER ACUTE COR PULMONALE PRESENT: ICD-10-CM

## 2024-05-24 DIAGNOSIS — Z98.890 S/P ROBOT-ASSISTED SURGICAL PROCEDURE: ICD-10-CM

## 2024-05-24 DIAGNOSIS — C54.1 ENDOMETRIAL CANCER: ICD-10-CM

## 2024-05-24 DIAGNOSIS — E03.9 HYPOTHYROIDISM, UNSPECIFIED TYPE: ICD-10-CM

## 2024-05-24 DIAGNOSIS — E02 SUBCLINICAL IODINE-DEFICIENCY HYPOTHYROIDISM: ICD-10-CM

## 2024-05-24 DIAGNOSIS — Z12.31 OTHER SCREENING MAMMOGRAM: Primary | ICD-10-CM

## 2024-05-24 LAB
T3FREE SERPL-MCNC: 5 PG/ML (ref 2.3–4.2)
T4 FREE SERPL-MCNC: 1.06 NG/DL (ref 0.71–1.51)
TSH SERPL DL<=0.005 MIU/L-ACNC: <0.01 UIU/ML (ref 0.4–4)

## 2024-05-24 PROCEDURE — 86146 BETA-2 GLYCOPROTEIN ANTIBODY: CPT | Performed by: OBSTETRICS & GYNECOLOGY

## 2024-05-24 PROCEDURE — 99213 OFFICE O/P EST LOW 20 MIN: CPT | Mod: PBBFAC | Performed by: OBSTETRICS & GYNECOLOGY

## 2024-05-24 PROCEDURE — 36415 COLL VENOUS BLD VENIPUNCTURE: CPT | Performed by: OBSTETRICS & GYNECOLOGY

## 2024-05-24 PROCEDURE — 81241 F5 GENE: CPT | Performed by: OBSTETRICS & GYNECOLOGY

## 2024-05-24 PROCEDURE — 99214 OFFICE O/P EST MOD 30 MIN: CPT | Mod: S$PBB,,, | Performed by: OBSTETRICS & GYNECOLOGY

## 2024-05-24 PROCEDURE — 84439 ASSAY OF FREE THYROXINE: CPT | Performed by: OBSTETRICS & GYNECOLOGY

## 2024-05-24 PROCEDURE — 84481 FREE ASSAY (FT-3): CPT | Performed by: OBSTETRICS & GYNECOLOGY

## 2024-05-24 PROCEDURE — 84443 ASSAY THYROID STIM HORMONE: CPT | Performed by: OBSTETRICS & GYNECOLOGY

## 2024-05-24 PROCEDURE — 85300 ANTITHROMBIN III ACTIVITY: CPT | Performed by: OBSTETRICS & GYNECOLOGY

## 2024-05-24 PROCEDURE — 99999 PR PBB SHADOW E&M-EST. PATIENT-LVL III: CPT | Mod: PBBFAC,,, | Performed by: OBSTETRICS & GYNECOLOGY

## 2024-05-24 RX ORDER — LEVOTHYROXINE, LIOTHYRONINE 76; 18 UG/1; UG/1
1 TABLET ORAL EVERY MORNING
COMMUNITY
Start: 2024-02-26

## 2024-05-24 RX ORDER — VIT B6/L. MEFOLATE/ME-B12/ALA 25-3.5-1MG
1 CAPSULE ORAL DAILY
COMMUNITY

## 2024-05-24 RX ORDER — APIXABAN 5 MG/1
5 TABLET, FILM COATED ORAL 2 TIMES DAILY
COMMUNITY

## 2024-05-24 NOTE — NURSING
RN Omid conducted chart review for clinic preparations including intake, barriers to care and opportunities for greater evaluation/recommendations by provider, Eugene Gale RN.

## 2024-05-24 NOTE — PROGRESS NOTES
"SUBJECTIVE:   68 y.o. female   presents today for follow up. . Patient's last menstrual period was 02/10/2014..  She reports that since her last visit she ws hospitalized in [place] with bialteral PE. She flew there recently.   She had hysterectomy and BSO for endometrial cancer 2024. She stopped HRT "cold turkey" except Pregnenolone prior to surgery. She is followed by Kadi Andersen for wellness and is on NP thyroid. Patient is uncertain when she was diagnosed with hypothryoid and reports her medication was recently changed based on symptoms  Of note her brother and sister both had DVT. Her sister's daughter was tested and is postive for Factor V leiden. .      She reprots pain in her hands and other muscle pain.She has appointment for acupuncture in   She is on Eliquis and is awaiting follow up with pulmonary.   Past Medical History:   Diagnosis Date    Abnormal ECG 2016: Anterior T wave inversion. Normal stress echo  Dr Grossman    Allergic rhinitis     ENT DR Meade, flonase bid    Bilateral hand pain 2022    Bunion of right foot 2019    MTP R 1st, [place]    Chronic gastric ulcer with hemorrhage 2022    Due to NSAIDS for L shoudler , sacroiliitis    Chronic left shoulder pain 2018    Fever blister 2018    Gallbladder sludge 2014    general surgeon Dr Maxx Paige at [facility]    Gastric polyps     EGD  Dr Brian    Hormone replacement therapy 2022    Postmenopausal vaginal bleeding 2018    Endometrial biopsy & tx by GYN Marv Smith 2018    Topical metronidazole    Seasonal allergic rhinitis due to pollen 2019    flonase daily, allergy testing in past, ENT Vaibhav    Seronegative spondyloarthropathy 2016    10/2015: Diagnosed.    Subclinical iodine-deficiency hypothyroidism 2022     Past Surgical History:   Procedure Laterality Date    APPENDECTOMY      laparoscopic same time as breezy    " CHOLECYSTECTOMY      CYSTOSCOPY  2/28/2024    Procedure: CYSTOSCOPY;  Surgeon: Jodie Velazquez MD;  Location: [facility] OR;  Service: OB/GYN;;    LYMPH NODE BIOPSY Bilateral 2/28/2024    Procedure: BIOPSY, LYMPH NODE;  Surgeon: Jodie Velazquez MD;  Location: [facility] OR;  Service: OB/GYN;  Laterality: Bilateral;    MAPPING, LYMPH NODE, SENTINEL Bilateral 2/28/2024    Procedure: MAPPING, LYMPH NODE, SENTINEL;  Surgeon: Jodie Velazquez MD;  Location: [facility] OR;  Service: OB/GYN;  Laterality: Bilateral;    ROBOT-ASSISTED LAPAROSCOPIC ABDOMINAL HYSTERECTOMY USING DA DULCE MARIA XI N/A 2/28/2024    Procedure: XI ROBOTIC HYSTERECTOMY;  Surgeon: Jodie Velazquez MD;  Location: [facility] OR;  Service: OB/GYN;  Laterality: N/A;  2 hr case    ROBOT-ASSISTED LAPAROSCOPIC SALPINGO-OOPHORECTOMY USING DA DULCE MARIA XI Bilateral 2/28/2024    Procedure: XI ROBOTIC SALPINGO-OOPHORECTOMY;  Surgeon: Jodie Velazquez MD;  Location: [facility] OR;  Service: OB/GYN;  Laterality: Bilateral;    SALIVARY GLAND SURGERY      R parotid, August 2012, Dr Meade ENT    SURGICAL REMOVAL OF BONE SPUR       Social History     Socioeconomic History    Marital status:     Number of children: 3   Occupational History    Occupation: retired   Tobacco Use    Smoking status: Never    Smokeless tobacco: Never   Substance and Sexual Activity    Alcohol use: Not Currently     Comment: social    Drug use: No    Sexual activity: Yes     Partners: Male   Social History Narrative    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,  , exercises on glider, 5 children, ETOH socially, colonoscopy with diverticulosis per pt 2010, GYN Marv     Social Determinants of Health     Financial Resource Strain: Low Risk  (4/3/2024)    Overall Financial Resource Strain (CARDIA)     Difficulty of Paying Living Expenses: Not very hard   Food Insecurity: No Food Insecurity (4/3/2024)    Hunger Vital Sign     Worried About Running Out of Food in the Last Year: Never  true     Ran Out of Food in the Last Year: Never true   Transportation Needs: No Transportation Needs (4/3/2024)    PRAPARE - Transportation     Lack of Transportation (Medical): No     Lack of Transportation (Non-Medical): No   Physical Activity: Unknown (4/3/2024)    Exercise Vital Sign     Days of Exercise per Week: Patient declined   Stress: No Stress Concern Present (4/3/2024)    [employer] [place] [employer] - Occupational Stress Questionnaire     Feeling of Stress : Only a little   Housing Stability: Low Risk  (4/3/2024)    Housing Stability Vital Sign     Unable to Pay for Housing in the Last Year: No     Number of Places Lived in the Last Year: 1     Unstable Housing in the Last Year: No     Family History   Problem Relation Name Age of Onset    Cancer Mother  80        CLL    Alzheimer's disease Mother  83    Atrial fibrillation Mother      Hypertension Mother      Cancer Father          pancreatic,  81    Diabetes Father      Heart failure Father      Fibromyalgia Daughter Floridalma     Ovarian cancer Neg Hx      Uterine cancer Neg Hx      Breast cancer Neg Hx      Colon cancer Neg Hx       OB History    Para Term  AB Living   3 3 3     3   SAB IAB Ectopic Multiple Live Births                  # Outcome Date GA Lbr Augusto/2nd Weight Sex Type Anes PTL Lv   3 Term     F Vag-Spont      2 Term     F Vag-Spont      1 Term     M Vag-Spont              Current Outpatient Medications   Medication Sig Dispense Refill    beta-carotene,A,-vits C,E/mins (OCUVITE ORAL) Take by mouth.      docosahexaenoic acid-epa 120-180 mg Cap Take 1 capsule by mouth once daily.      ELIQUIS 5 mg Tab Take 5 mg by mouth 2 (two) times daily.      Lactobacillus acidophilus (PROBIOTIC ORAL) Take 12.5 Billion Cells by mouth Daily. 1 capsule a day.      MULTIVITAMIN ORAL Take 1 tablet by mouth 2 (two) times daily.      NP THYROID 120 mg Tab Take 1 tablet by mouth every morning.      OXYGEN-AIR DELIVERY SYSTEMS [facility]  Inhale 1 L/min into the lungs.      vit B6-L. [id number]-ALA (NUFOLA) 25 mg-3,500 mcg DFE-1 mg-300 mg Cap Take 1 tablet by mouth once daily.      DOCOSAHEXAENOIC ACID ORAL Take 1 tablet by mouth once daily. (Patient not taking: Reported on 5/24/2024)      UNABLE TO FIND Take 120 mg by mouth once daily. NP Thyroid (Patient not taking: Reported on 5/24/2024)      UNABLE TO FIND once daily. lumity (Patient not taking: Reported on 5/24/2024)      UNABLE TO FIND once daily. dosokap (Patient not taking: Reported on 5/24/2024)      UNABLE TO FIND once daily. omaprem (Patient not taking: Reported on 5/24/2024)      UNABLE TO FIND once daily. adrecor (Patient not taking: Reported on 5/24/2024)      VITAMIN D2-VITAMIN K1 ORAL Take 1 tablet by mouth once daily. (Patient not taking: Reported on 5/24/2024)       No current facility-administered medications for this visit.     Allergies: Clindamycin, Doxycycline, Nsaids (non-steroidal anti-inflammatory drug), Oxycodone, Percocet [oxycodone-acetaminophen], and Sulfa (sulfonamide antibiotics)     The 10-year ASCVD risk score (Jordon CARRASCO, et al., 2019) is: 5.8%    Values used to calculate the score:      Age: 68 years      Sex: Female      Is Non- : No      Diabetic: No      Tobacco smoker: No      Systolic Blood Pressure: 120 mmHg      Is BP treated: No      HDL Cholesterol: 51 mg/dL      Total Cholesterol: 210 mg/dL      ROS:  Constitutional: no weight loss, weight gain, fever, fatigue  Eyes:  No vision changes, glasses/contacts  ENT/Mouth: No ulcers, sinus problems, ears ringing, headache  Cardiovascular: No inability to lie flat, chest pain, exercise intolerance, swelling, heart palpitations  Respiratory: No wheezing, coughing blood, shortness of breath, or cough  Gastrointestinal: No diarrhea, bloody stool, nausea/vomiting, constipation, gas, hemorrhoids  Genitourinary: No blood in urine, painful urination, urgency of urination, frequency of  urination, incomplete emptying, incontinence, abnormal bleeding, painful periods, heavy periods, vaginal discharge, vaginal odor, painful intercourse, sexual problems, bleeding after intercourse.  Musculoskeletal: No muscle weakness, +muscle pain and joint pain  Skin/Breast: No painful breasts, nipple discharge, masses, rash, ulcers  Neurological: No passing out, seizures, numbness, headache  Endocrine: No diabetes, hypothyroid, hyperthyroid, hot flashes, hair loss, abnormal hair growth, acne  Psychiatric: No depression, crying  Hematologic: No bruises, bleeding, swollen lymph nodes, anemia.      Physical Exam  deferred  ASSESSMENT:     Endometrial cancer  1. Other screening mammogram  Mammo Digital Screening Bilat w/ [place]      2. Pulmonary embolism, unspecified chronicity, unspecified pulmonary embolism type, unspecified whether acute cor pulmonale present  Antithrombin III    PROTEIN C    PROTEIN S FUNCTIONAL ACTIVITY    FACTOR 5 LEIDEN    BETA-2 GLYCOPROTEIN ANTIBODIES    CANCELED: FACTOR 5 ASSAY      3. Endometrial cancer        4. Hypothyroidism, unspecified type  TSH    T4, FREE    T3, FREE      5. S/P robot-assisted TLH/BSO/SLND/cystoscopy        6. Subclinical iodine-deficiency hypothyroidism              PLAN:   Total time 45 minutes- face to face, review of medical record and arranging follow up  Counseled patient that if she was diagnosed with hypothyroidism staying on NP in ok. However, if she is being optimized I do not recommend treating that. Will check labs today and may need to discuss with Dr. Shahid( she is not treating her, Kadi Ganesh is but I am not sure that NP thyroid should be continued)  Counseled patient that I will order labs for workup given her family history of DVT and Factor V leiden  I do not recommend getting back on HRT because at 68 years old risk outweighs benefit and now she has had a PE so risk definitely outweighs benefit. I also don't think she will need HRT   Recommend that  she follow up for acupuncture  Continue magnesium glycinate   Mammogram for screening - has been seen at [facility]

## 2024-05-27 LAB — AT III ACT/NOR PPP CHRO: >131 % (ref 83–118)

## 2024-05-28 ENCOUNTER — TELEPHONE (OUTPATIENT)
Dept: PRIMARY CARE CLINIC | Facility: CLINIC | Age: 69
End: 2024-05-28
Payer: MEDICARE

## 2024-05-28 LAB
B2 GLYCOPROT1 IGA SER QL: 1.5 U/ML
B2 GLYCOPROT1 IGG SER QL: 1 U/ML
B2 GLYCOPROT1 IGM SER QL: <2.4 U/ML
PROT C ACT/NOR PPP CHRO: 105 % (ref 70–150)

## 2024-05-28 NOTE — TELEPHONE ENCOUNTER
----- Message from Melvinacinthia Gardner sent at 5/28/2024  4:31 PM CDT -----  Contact: Self [phone]  Would like to receive medical advice.    Would they like a call back or a response via [facility]:  call back    Additional information:  Calling to get an appt with above provider due to he next appt showing july. Pt states that she is in pain

## 2024-05-29 LAB
F5 P.R506Q BLD/T QL: NEGATIVE
PROT S ACT/NOR PPP: 130 % (ref 65–150)

## 2024-05-30 ENCOUNTER — TELEPHONE (OUTPATIENT)
Dept: PULMONOLOGY | Facility: CLINIC | Age: 69
End: 2024-05-30
Payer: MEDICARE

## 2024-05-30 NOTE — TELEPHONE ENCOUNTER
Attempted to reach pt to rescheduled her pulmonary appointment. No answer message left. Patient appointment has been rescheduled.

## 2024-05-31 ENCOUNTER — TELEPHONE (OUTPATIENT)
Dept: PULMONOLOGY | Facility: CLINIC | Age: 69
End: 2024-05-31
Payer: MEDICARE

## 2024-05-31 ENCOUNTER — HOSPITAL ENCOUNTER (OUTPATIENT)
Dept: RADIOLOGY | Facility: HOSPITAL | Age: 69
Discharge: HOME | End: 2024-05-31
Attending: OBSTETRICS & GYNECOLOGY
Payer: MEDICARE

## 2024-05-31 ENCOUNTER — TELEPHONE (OUTPATIENT)
Dept: PRIMARY CARE CLINIC | Facility: CLINIC | Age: 69
End: 2024-05-31
Payer: MEDICARE

## 2024-05-31 VITALS — BODY MASS INDEX: 22.99 KG/M2 | WEIGHT: 138 LBS | HEIGHT: 65 IN

## 2024-05-31 DIAGNOSIS — Z12.31 OTHER SCREENING MAMMOGRAM: ICD-10-CM

## 2024-05-31 PROCEDURE — 77063 BREAST TOMOSYNTHESIS BI: CPT | Mod: 26,,, | Performed by: RADIOLOGY

## 2024-05-31 PROCEDURE — 77067 SCR MAMMO BI INCL CAD: CPT | Mod: TC

## 2024-05-31 PROCEDURE — 77067 SCR MAMMO BI INCL CAD: CPT | Mod: 26,,, | Performed by: RADIOLOGY

## 2024-05-31 PROCEDURE — 77063 BREAST TOMOSYNTHESIS BI: CPT | Mod: TC

## 2024-05-31 NOTE — TELEPHONE ENCOUNTER
----- Message from Alysia Girard sent at 5/31/2024 10:58 AM CDT -----  Contact: [phone]  Symptom: Abdominal Pain - Female - Not Pregnant  Outcome: Schedule an appointment to be seen within 24 hours.  Reason: Caller denied all higher acuity questions    The caller accepted this outcome    Pt states she went to the ob but they do not handle the pain from hysterectomy please advise

## 2024-05-31 NOTE — TELEPHONE ENCOUNTER
Pt calling with abd pain. Informed pt Dr Shahid is out of the office and will return on 6/11. Pt did not want to be seen sooner by another provider. Appt scheduled with PCP on 6/11.

## 2024-05-31 NOTE — TELEPHONE ENCOUNTER
----- Message from Nancy Zaldivar MA sent at 5/31/2024 10:11 AM CDT -----  Regarding: FW: pt advice  Contact: [phone]    ----- Message -----  From: Debbi Schroeder  Sent: 5/31/2024  10:08 AM CDT  To: [facility] Staff  Subject: pt advice                                        Pt returning missed call from aquiles Strong call

## 2024-06-03 ENCOUNTER — OFFICE VISIT (OUTPATIENT)
Dept: PULMONOLOGY | Facility: CLINIC | Age: 69
End: 2024-06-03
Payer: MEDICARE

## 2024-06-03 VITALS
OXYGEN SATURATION: 96 % | BODY MASS INDEX: 22.81 KG/M2 | WEIGHT: 136.88 LBS | HEART RATE: 87 BPM | SYSTOLIC BLOOD PRESSURE: 126 MMHG | HEIGHT: 65 IN | DIASTOLIC BLOOD PRESSURE: 70 MMHG

## 2024-06-03 DIAGNOSIS — J30.1 SEASONAL ALLERGIC RHINITIS DUE TO POLLEN: Primary | ICD-10-CM

## 2024-06-03 DIAGNOSIS — R91.8 PULMONARY NODULES: ICD-10-CM

## 2024-06-03 DIAGNOSIS — C54.1 ENDOMETRIAL CANCER: ICD-10-CM

## 2024-06-03 DIAGNOSIS — R91.8 PULMONARY NODULES/LESIONS, MULTIPLE: ICD-10-CM

## 2024-06-03 DIAGNOSIS — I26.94 MULTIPLE SUBSEGMENTAL PULMONARY EMBOLI WITHOUT ACUTE COR PULMONALE: ICD-10-CM

## 2024-06-03 DIAGNOSIS — Z98.890 S/P ROBOT-ASSISTED SURGICAL PROCEDURE: ICD-10-CM

## 2024-06-03 PROCEDURE — 99205 OFFICE O/P NEW HI 60 MIN: CPT | Mod: S$PBB,,, | Performed by: INTERNAL MEDICINE

## 2024-06-03 PROCEDURE — 99999 PR PBB SHADOW E&M-EST. PATIENT-LVL IV: CPT | Mod: PBBFAC,,, | Performed by: INTERNAL MEDICINE

## 2024-06-03 PROCEDURE — 99214 OFFICE O/P EST MOD 30 MIN: CPT | Mod: PBBFAC | Performed by: INTERNAL MEDICINE

## 2024-06-03 NOTE — ASSESSMENT & PLAN NOTE
Diagnosed during a trip to [place] in April of 2024.  CTA revealed bilateral clot burden of significant quantity.  At the time she had recently undergone surgery in February of 2024 for hysterectomy for endometrial cancer.  She was also taking hormone replacement therapy at this time.  Also just underwent a flight to [place].  It seems this is likely to be a provoked PE.  - hypercoagulable workup is negative thus far.  -continue Eliquis for 6 months from onset of pulmonary embolism.  This will be at least until October of 2024.  We will follow up at this time and consider a possible repeat CTA at that time.  She maybe a candidate for discontinuation but we will need to carefully monitor her symptoms for recurrence.  -avoid hormone replacement therapy   -avoid prolonged immobility.

## 2024-06-03 NOTE — PROGRESS NOTES
Subjective:      Patient ID: Caitlyn Grace is a 68 y.o. female.    Chief Complaint: Abnormal Ct Scan, Pulmonary Nodules, Shortness of Breath, and Hospital Follow Up    Caitlyn Grace    Has an active medical problem list that includes:   Seronegative spondyloarthropathy, seasonal allergies due to pollen, rosacea, hyperlipidemia, postmenopausal vaginal bleeding, endometrial cancer (s/p hysterectomy 2/28/24), subclinical iodine deficiency hypothyroidism, on hormone replacement therapy, chronic gastric ulcer with hemorrhage, who presents as a self-referral following the diagnosis of PE and 3 mm pulmonary nodule during a hospitalization in [place].  She went to the hospital at that time for shortness of breath.  The night before her presentation she had palpitations.    She underwent surgery in 2/2024, had a flight to [place], hormone replacement therapy and recent endometrial cancer that may explain a provoked PE.   Also covid in 12/2023.     She does have many family members that have had PE and DVTs.  Some family members with factor V Leiden proven (niece).      Tobacco/vape: never  Occupation: office work  Exertional capacity: no limits  Prior lung disease: none  Sputum production: occasional  Allergies/sinusitis: yes, following with ENT and taking multiple treatments.   GERD/Aspiration: none  Pets: none  Travel/TB: never  Respiratory regimen: none  Prior cancer: endometrial, s/p resection.  Prior hospitalization/intubation: hospitalized in [place] for shortness of breath for PE in 4/2024.   Snoring/apnea: none     Today she is doing well and in her usual state of health.  Her breathing is improving and her sats are normal in clinic.        Review of Systems   Constitutional:  Negative for chills, weight gain, activity change, appetite change, fatigue and weakness.   HENT:  Negative for postnasal drip, rhinorrhea, sinus pressure and congestion.    Respiratory:  Negative for cough, hemoptysis, chest  "tightness, shortness of breath, wheezing, previous hospitialization due to pulmonary problems, dyspnea on extertion and use of rescue inhaler.    Cardiovascular:  Negative for chest pain, palpitations and leg swelling.   Gastrointestinal:  Negative for nausea, vomiting and abdominal pain.   Hematological:  Bleeds easily and excessive bruising.   Psychiatric/Behavioral:  Negative for confusion and sleep disturbance. The patient is not nervous/anxious.      Objective:     Vitals:    06/03/24 1305   BP: 126/70   BP Location: Left arm   Patient Position: Sitting   Pulse: 87   SpO2: 96%   Weight: 62.1 kg (136 lb 14.5 oz)   Height: 5' 5" (1.651 m)       Physical Exam   Constitutional: She is oriented to person, place, and time. She appears well-developed and well-nourished. No distress. She is not obese.   HENT:   Head: Normocephalic.   Neck: No JVD present.   Cardiovascular: Normal rate, regular rhythm and normal heart sounds. Exam reveals no gallop and no friction rub.   No murmur heard.  Pulmonary/Chest: Normal expansion, symmetric chest wall expansion, effort normal and breath sounds normal.   Abdominal: Soft. Bowel sounds are normal. She exhibits no distension. There is no abdominal tenderness.   Musculoskeletal:         General: No edema. Normal range of motion.      Cervical back: Normal range of motion and neck supple.   Neurological: She is alert and oriented to person, place, and time.   Skin: Skin is warm and dry. She is not diaphoretic.   Psychiatric: She has a normal mood and affect. Her behavior is normal. Judgment and thought content normal.       Personal Diagnostic Review    CTA Chest 4/27/2024  Bilateral, large burden of nearly occlusive segmental pulmonary emboli.  Evidence of right heart strain on CT scan.    CT C/A/P 2/19/2024  Lungs are symmetrically expanded. Bilateral pulmonary micro nodules largest measuring 3 mm (6-239), additional micro nodules for reference (6-231, 302, 306, 346). Bilateral " "calcified granulomas largest measuring 3 mm (6-92) additional calcified granulomas for reference (6-239, 278, 345). No focal consolidation, mass or significant pleural thickening or pleural fluid. No pneumothorax.         6/3/2024     1:05 PM 5/31/2024     9:33 AM 5/24/2024     2:30 PM 4/24/2024     2:59 PM 4/3/2024     3:51 PM 4/1/2024    10:27 AM 2/28/2024     2:30 PM   Pulmonary Function Tests   SpO2 96 %  100 %  96 %  97 %   Height 5' 5" (1.651 m) 5' 5" (1.651 m) 5' 5" (1.651 m) 5' 4" (1.626 m) 5' 4" (1.626 m) 5' 6" (1.676 m)    Weight 62.1 kg (136 lb 14.5 oz) 62.6 kg (138 lb) 62.3 kg (137 lb 5.6 oz) 64 kg (141 lb) 64.1 kg (141 lb 5 oz) 63.3 kg (139 lb 8.8 oz)    BMI (Calculated) 22.8 23 22.9 24.2 24.2 22.5         Assessment:     1. Seasonal allergic rhinitis due to pollen    2. Pulmonary nodules    3. Multiple subsegmental pulmonary emboli without acute cor pulmonale    4. Pulmonary nodules/lesions, multiple    5. Endometrial cancer    6. S/P robot-assisted TLH/BSO/SLND/cystoscopy         Outpatient Encounter Medications as of 6/3/2024   Medication Sig Dispense Refill    beta-carotene,A,-vits C,E/mins (OCUVITE ORAL) Take by mouth.      DOCOSAHEXAENOIC ACID ORAL Take 1 tablet by mouth once daily.      docosahexaenoic acid-epa 120-180 mg Cap Take 1 capsule by mouth once daily.      ELIQUIS 5 mg Tab Take 5 mg by mouth 2 (two) times daily.      Lactobacillus acidophilus (PROBIOTIC ORAL) Take 12.5 Billion Cells by mouth Daily. 1 capsule a day.      MULTIVITAMIN ORAL Take 1 tablet by mouth 2 (two) times daily.      NP THYROID 120 mg Tab Take 1 tablet by mouth every morning. Patient states she takes 90 mg.      OXYGEN-AIR DELIVERY SYSTEMS MISC Inhale 1 L/min into the lungs.      UNABLE TO FIND Take 120 mg by mouth once daily. NP Thyroid      UNABLE TO FIND once daily. lumity      UNABLE TO FIND once daily. dosokap      UNABLE TO FIND once daily. omaprem      UNABLE TO FIND once daily. adrecor      vit B6-L. " [id number]-ALA (NUFOLA) 25 mg-3,500 mcg DFE-1 mg-300 mg Cap Take 1 tablet by mouth once daily.      VITAMIN D2-VITAMIN K1 ORAL Take 1 tablet by mouth once daily.       No facility-administered encounter medications on file as of 6/3/2024.     No orders of the defined types were placed in this encounter.      Plan:     Problem List Items Addressed This Visit          ENT    Seasonal allergic rhinitis due to pollen - Primary    Overview     flonase daily, allergy testing in past, ENT Vaibhav         Current Assessment & Plan       Utilizing Neti pot and multiple other modalities as prescribed by ENT.  Doing well with these interventions            Pulmonary    Pulmonary nodules/lesions, multiple    Current Assessment & Plan       Bilateral scattered subcentimeter pulmonary nodules noted on CT scan obtained in 02/20/2024.  Largest nodule is 3 mm in diameter.  She is low risk for lung cancer primary given no smoking history.  She does have a history of endometrial cancer that was local and treated with curative intent by hysterectomy.  I feel that these nodules are very likely benign.  Per Fleischner criteria there is an optional CT scan of the thorax that can be offered at 12 months from there discovery in February of 2025.  We will continue discuss this Moving forward.            Hematology    Multiple subsegmental pulmonary emboli without acute cor pulmonale    Current Assessment & Plan      Diagnosed during a trip to [place] in April of 2024.  CTA revealed bilateral clot burden of significant quantity.  At the time she had recently undergone surgery in February of 2024 for hysterectomy for endometrial cancer.  She was also taking hormone replacement therapy at this time.  Also just underwent a flight to [place].  It seems this is likely to be a provoked PE.  - hypercoagulable workup is negative thus far.  -continue Eliquis for 6 months from onset of pulmonary embolism.  This will be at least until October of  2024.  We will follow up at this time and consider a possible repeat CTA at that time.  She maybe a candidate for discontinuation but we will need to carefully monitor her symptoms for recurrence.  -avoid hormone replacement therapy   -avoid prolonged immobility.            Oncology    Endometrial cancer    Current Assessment & Plan      Continue to follow with Gyne Onc as recommended.            Palliative Care    S/P robot-assisted TLH/BSO/SLND/cystoscopy     Other Visit Diagnoses       Pulmonary nodules              RTC 4 months    Remy Butler MD  [facility]

## 2024-06-03 NOTE — ASSESSMENT & PLAN NOTE
Bilateral scattered subcentimeter pulmonary nodules noted on CT scan obtained in 02/20/2024.  Largest nodule is 3 mm in diameter.  She is low risk for lung cancer primary given no smoking history.  She does have a history of endometrial cancer that was local and treated with curative intent by hysterectomy.  I feel that these nodules are very likely benign.  Per Fleischner criteria there is an optional CT scan of the thorax that can be offered at 12 months from there discovery in February of 2025.  We will continue discuss this Moving forward.

## 2024-06-03 NOTE — ASSESSMENT & PLAN NOTE
Utilizing Neti pot and multiple other modalities as prescribed by ENT.  Doing well with these interventions

## 2024-06-10 ENCOUNTER — CLINICAL SUPPORT (OUTPATIENT)
Dept: REHABILITATION | Facility: HOSPITAL | Age: 69
End: 2024-06-10
Attending: OBSTETRICS & GYNECOLOGY
Payer: MEDICARE

## 2024-06-10 DIAGNOSIS — M54.50 CHRONIC LOW BACK PAIN: ICD-10-CM

## 2024-06-10 DIAGNOSIS — R51.9 CHRONIC HEADACHES: ICD-10-CM

## 2024-06-10 DIAGNOSIS — G89.29 CHRONIC HEADACHES: ICD-10-CM

## 2024-06-10 DIAGNOSIS — M54.59 OTHER LOW BACK PAIN: Primary | ICD-10-CM

## 2024-06-10 DIAGNOSIS — G89.29 CHRONIC LOW BACK PAIN: ICD-10-CM

## 2024-06-10 DIAGNOSIS — C54.1 ENDOMETRIAL CANCER: ICD-10-CM

## 2024-06-10 DIAGNOSIS — M79.642 BILATERAL HAND PAIN: ICD-10-CM

## 2024-06-10 DIAGNOSIS — M79.641 BILATERAL HAND PAIN: ICD-10-CM

## 2024-06-10 PROCEDURE — 97813 ACUP 1/> W/ESTIM 1ST 15 MIN: CPT | Mod: PN | Performed by: ACUPUNCTURIST

## 2024-06-10 PROCEDURE — 97811 ACUP 1/> W/O ESTIM EA ADD 15: CPT | Mod: PN | Performed by: ACUPUNCTURIST

## 2024-06-10 NOTE — PROGRESS NOTES
Acupuncture Evaluation Note     [facility]  [facility] Number: [id number]    Traditional Chinese Medicine (TCM) Diagnosis: Qi Stagnation, Blood Stasis, and Blood Deficiency  Medical Diagnosis:   Encounter Diagnoses   Name Primary?    Endometrial cancer     Chronic low back pain     Bilateral hand pain     Chronic headaches     Other low back pain         Evaluation Date: 6/10/2024    Visit #/Visits authorized: 12, 1/12     Precautions: blood thinners and cancer    Subjective     Chief Concern: Chronic low back pain onset 1996, gradual progression. Osteopenia and DDD in lumbar spine. Back pain has worsened lately in lower right side, radiating bilaterally into hips. Severe bilateral hand pain onset 3 years ago with use, worsened in January 2024. Pain begins at base of thumb joint and radiates into digit joints and wrists.     Medical necessity is demonstrated by the following IMPAIRMENTS: Medical Necessity: Decreased mobility limits day to day activities, social, and emergent situations and Decreased quality of life              Aggravating Factors:  movement, flexion, extension, and rotation internal and external   Relieving Factors:  ice    Symptom Description:     Quality:  Sharp and shooting, Tight   Severity:  9  Frequency:  continuously    Previous Treatments Tried:  Medication    HEENT:  Sinus issues, comes and goes every year. Left side ear pain, thumping sound, tinnitus in both ears. Left ear drum is up in the middle ear. Lump in right ear removed years ago. Hx of ear infections as a child. Hypothyroidism. Headaches when starting with blood thinners.     Chest:  Shortness of breath with exertion. Large blood clots found in lungs April 2024.     Digestion:     Diet: not asked   Fluids:  not asked ,  not asked ,  not asked  Taste/Appetite: not asked   Symptoms:  Nauseated, pain in upper right quadrant of abdomen. Recent cat scan shows changes to cholecystectomy. December 2019 appendix and gallbladder  removed.     Sleep: Poor sleep, interrupted. Difficulty falling asleep with frequent wake ups. Wakes at 2/2:30AM.     Energy Levels:  feels okay, but gets bouts of fatigue     Psychological Symptoms:  not asked     Other Symptoms: Left shoulder pain, poor ROM with pain near shoulder blade. Hx of surgery for bone spur at right big toe.     GYN Symptoms: 3 healthy births. 1982 DNC performed immediately following birth of second child. In her late 40s, heavy bleeding and clots with menses, thick lining. Started menopause 15 years ago in her early 50s.  Had trouble sleeping with menopause, started hormone replacement therapy. Started spotting within last 10 years, about 1x/year. Bleeding in late January 2024 led to full hysterectomy 2/28/2024, with 4 lymph removed (lymph all negative). Hx of submucosal fibroid, polyp in lining. No pain.     Objective     Observation:     Tongue:  red tip, red edges, sl yellow thin coat, dark sublinguals     Body:     Color:     Coating:      Pulse:  left wiry, left cun wiry              New Findings:      Treatment     Treatment Principles:  Move qi, nourish blood, stop pain     Acupuncture points used:  4 JUS and BA CARA    Bilateral points:  Unilateral points:  Auricular Treatment:      Needles In: 12  Needles Out: 12  [place] W/ STIM placed: 11:35  Needles W/ STIM removed: 11:55      Other Traditional Chinese Medicine Modalities -  heat lamp     Assessment     After treatment, patient felt fine, hand pain present     Patient prognosis is Good.     Patient will continue to benefit from acupuncture treatment to address the deficits listed in the problem list box on initial evaluation, provide patient family education and to maximize pt's level of independence in the home and community environment.     Patient's spiritual, cultural and educational needs considered and pt agreeable to plan of care and goals.     Anticipated barriers to treatment: none    Plan     Recommend 1 /week for  5 sessions then re-assess.      Education:  Patient is aware of cumulative benefit of acupuncture

## 2024-06-11 ENCOUNTER — OFFICE VISIT (OUTPATIENT)
Dept: PRIMARY CARE CLINIC | Facility: CLINIC | Age: 69
End: 2024-06-11
Payer: MEDICARE

## 2024-06-11 ENCOUNTER — LAB VISIT (OUTPATIENT)
Dept: LAB | Facility: HOSPITAL | Age: 69
End: 2024-06-11
Attending: FAMILY MEDICINE
Payer: MEDICARE

## 2024-06-11 VITALS
TEMPERATURE: 99 F | WEIGHT: 137.56 LBS | DIASTOLIC BLOOD PRESSURE: 78 MMHG | OXYGEN SATURATION: 98 % | HEIGHT: 65 IN | HEART RATE: 73 BPM | BODY MASS INDEX: 22.92 KG/M2 | SYSTOLIC BLOOD PRESSURE: 128 MMHG

## 2024-06-11 DIAGNOSIS — R10.11 RIGHT UPPER QUADRANT ABDOMINAL PAIN: Primary | ICD-10-CM

## 2024-06-11 DIAGNOSIS — M79.642 BILATERAL HAND PAIN: ICD-10-CM

## 2024-06-11 DIAGNOSIS — Z90.49 HISTORY OF CHOLECYSTECTOMY: ICD-10-CM

## 2024-06-11 DIAGNOSIS — C54.1 ENDOMETRIAL CANCER: ICD-10-CM

## 2024-06-11 DIAGNOSIS — R10.10 UPPER ABDOMINAL PAIN, UNSPECIFIED: ICD-10-CM

## 2024-06-11 DIAGNOSIS — R10.11 RIGHT UPPER QUADRANT ABDOMINAL PAIN: ICD-10-CM

## 2024-06-11 DIAGNOSIS — M79.641 BILATERAL HAND PAIN: ICD-10-CM

## 2024-06-11 DIAGNOSIS — K62.89 RECTAL PAIN: ICD-10-CM

## 2024-06-11 DIAGNOSIS — I26.94 MULTIPLE SUBSEGMENTAL PULMONARY EMBOLI WITHOUT ACUTE COR PULMONALE: ICD-10-CM

## 2024-06-11 PROBLEM — Z79.890 HORMONE REPLACEMENT THERAPY: Status: RESOLVED | Noted: 2022-04-05 | Resolved: 2024-06-11

## 2024-06-11 PROBLEM — N95.0 POSTMENOPAUSAL VAGINAL BLEEDING: Status: RESOLVED | Noted: 2018-04-18 | Resolved: 2024-06-11

## 2024-06-11 LAB
ALBUMIN SERPL BCP-MCNC: 4.1 G/DL (ref 3.5–5.2)
ALP SERPL-CCNC: 99 U/L (ref 55–135)
ALT SERPL W/O P-5'-P-CCNC: 18 U/L (ref 10–44)
ANION GAP SERPL CALC-SCNC: 11 MMOL/L (ref 8–16)
AST SERPL-CCNC: 16 U/L (ref 10–40)
BILIRUB SERPL-MCNC: 0.4 MG/DL (ref 0.1–1)
BILIRUB UR QL STRIP: NEGATIVE
BUN SERPL-MCNC: 11 MG/DL (ref 8–23)
CALCIUM SERPL-MCNC: 9.8 MG/DL (ref 8.7–10.5)
CHLORIDE SERPL-SCNC: 103 MMOL/L (ref 95–110)
CLARITY UR REFRACT.AUTO: CLEAR
CO2 SERPL-SCNC: 25 MMOL/L (ref 23–29)
COLOR UR AUTO: COLORLESS
CREAT SERPL-MCNC: 0.8 MG/DL (ref 0.5–1.4)
EST. GFR  (NO RACE VARIABLE): >60 ML/MIN/1.73 M^2
GLUCOSE SERPL-MCNC: 109 MG/DL (ref 70–110)
GLUCOSE UR QL STRIP: NEGATIVE
HGB UR QL STRIP: NEGATIVE
KETONES UR QL STRIP: NEGATIVE
LEUKOCYTE ESTERASE UR QL STRIP: NEGATIVE
NITRITE UR QL STRIP: NEGATIVE
PH UR STRIP: 6 [PH] (ref 5–8)
POTASSIUM SERPL-SCNC: 3.9 MMOL/L (ref 3.5–5.1)
PROT SERPL-MCNC: 7.2 G/DL (ref 6–8.4)
PROT UR QL STRIP: NEGATIVE
SODIUM SERPL-SCNC: 139 MMOL/L (ref 136–145)
SP GR UR STRIP: 1 (ref 1–1.03)
URN SPEC COLLECT METH UR: ABNORMAL

## 2024-06-11 PROCEDURE — 80053 COMPREHEN METABOLIC PANEL: CPT | Performed by: FAMILY MEDICINE

## 2024-06-11 PROCEDURE — 99215 OFFICE O/P EST HI 40 MIN: CPT | Mod: PBBFAC,PN | Performed by: FAMILY MEDICINE

## 2024-06-11 PROCEDURE — 81003 URINALYSIS AUTO W/O SCOPE: CPT | Performed by: FAMILY MEDICINE

## 2024-06-11 PROCEDURE — 99999 PR PBB SHADOW E&M-EST. PATIENT-LVL V: CPT | Mod: PBBFAC,,, | Performed by: FAMILY MEDICINE

## 2024-06-11 PROCEDURE — 36415 COLL VENOUS BLD VENIPUNCTURE: CPT | Mod: PN | Performed by: FAMILY MEDICINE

## 2024-06-11 PROCEDURE — 99214 OFFICE O/P EST MOD 30 MIN: CPT | Mod: S$PBB,,, | Performed by: FAMILY MEDICINE

## 2024-06-11 RX ORDER — MULTIVIT WITH MINERALS/HERBS
1 TABLET ORAL DAILY
COMMUNITY

## 2024-06-11 NOTE — ASSESSMENT & PLAN NOTE
X 3 yrs, severe pain when a friend shook her hand at [facility], swollen, itch    A little better w accupuncture    Has appt w Hand specialist next week

## 2024-06-11 NOTE — ASSESSMENT & PLAN NOTE
X 3 mo since surgery, did have lymph node removed during endometrial surgery. Nothing noted on exam, nml rectal tone, no hemorrhoids, no fissure, no irritation, no rash, no mass, no stool in vault    If persists, consider seeing GI or Colon & rectal

## 2024-06-11 NOTE — PROGRESS NOTES
Assessment:     1. Right upper quadrant abdominal pain    2. Bilateral hand pain    3. History of cholecystectomy    4. Rectal pain    5. Multiple subsegmental pulmonary emboli without acute cor pulmonale    6. Endometrial cancer          Plan:     Multiple subsegmental pulmonary emboli without acute cor pulmonale  Per Dr Butler (Pulm recommdndations):  Eliquis 6 mo until Oct 2024 (from provoked PE Hyst surgery  2/2024 for Endometrial CA, taking HRT, flight to [place])    Right upper quadrant abdominal pain  Since Feb 28 ,2024 surgery, that incision was very sore (surgeon said there was lots of scar tissue around gallbladder, but pt did not have abd surgery other than appendectomy), pain under Right rib & to R flank & back, nauseated. Sat I thought about going to ER. Pain severe & lasts briefly, but then subsides.     Had Cholecystectomy in past, but this nausea is similar to when she had Cholecystitis    Had Appendectomy    Does not take daily NSAID. Did have NSAID gastric ulcer in the past.     Urinalysis (look for blood for possible kidney stone)  RUQ abd US  CMP  HIDA          Bilateral hand pain  X 3 yrs, severe pain when a friend shook her hand at [employer], swollen, itch    A little better w accupuncture    Has appt w Hand specialist next week    Rectal pain  X 3 mo since surgery, did have lymph node removed during endometrial surgery. Nothing noted on exam, nml rectal tone, no hemorrhoids, no fissure, no irritation, no rash, no mass, no stool in vault    If persists, consider seeing GI or Colon & rectal    Endometrial cancer  Stable, follow w Oncologist    History of cholecystectomy  HIDA for RUQ abd pain & nausea similar to prior to Cholecystectomy        HPI: Caitlyn Grace is a 68 y.o. female with is here today for abd pain JEFFRY    She states [place] [facility]     CT 2/19/2024  ABDOMEN/PELVIS:     Liver: Normal size with smooth contours. No focal abnormality.     Gallbladder: Surgically absent.    "  Bile ducts: No intrahepatic or extrahepatic biliary ductal dilatation.     Spleen: Normal size.     Pancreas: No mass. No ductal dilatation. No peripancreatic fat stranding.      Current Outpatient Medications   Medication Instructions    b complex vitamins tablet 1 tablet, Oral, Daily    beta-carotene,A,-vits C,E/mins (OCUVITE ORAL) Oral    ELIQUIS 5 mg, Oral, 2 times daily    Lactobacillus acidophilus (PROBIOTIC ORAL) 12.5 Billion Cells, Oral, Daily, 1 capsule a day.     MULTIVITAMIN ORAL 1 tablet, Oral, 2 times daily    NP THYROID 120 mg Tab 1 tablet, Oral, Every morning, Patient states she takes 90 mg.    UNABLE TO FIND Daily, lumity    UNABLE TO FIND Daily, dosokap    UNABLE TO FIND Daily, omaprem    UNABLE TO FIND Daily, adrecor    vit B6-L. esacdvny-gc-X09-ALA (NUFOLA) 25 mg-3,500 mcg DFE-1 mg-300 mg Cap 1 tablet, Oral, Daily    VITAMIN D2-VITAMIN K1 ORAL 1 tablet, Oral, Daily       No results found for: "HGBA1C"  No results found for: "MICALBCREAT"  Lab Results   Component Value Date    LDLCALC 138.0 03/29/2022    LDLCALC 134.8 04/15/2019    CHOL 210 (H) 03/29/2022    HDL 51 03/29/2022    TRIG 105 03/29/2022       Lab Results   Component Value Date     02/19/2024    K 4.6 02/19/2024     02/19/2024    CO2 27 02/19/2024     02/19/2024    BUN 7 (L) 02/19/2024    CREATININE 0.8 02/19/2024    CALCIUM 9.9 02/19/2024    PROT 7.8 02/19/2024    ALBUMIN 4.4 02/19/2024    BILITOT 0.2 02/19/2024    ALKPHOS 80 02/19/2024    AST 20 02/19/2024    ALT 20 02/19/2024    ANIONGAP 8 02/19/2024    ESTGFRAFRICA >60.0 03/29/2022    EGFRNONAA >60.0 03/29/2022    WBC 10.28 02/19/2024    HGB 15.3 02/19/2024    HGB 13.9 03/29/2022    HCT 46.7 02/19/2024    MCV 86 02/19/2024     02/19/2024    TSH <0.010 (L) 05/24/2024    HEPCAB Negative 05/05/2016       Lab Results   Component Value Date    FSH 27 05/15/2008    ESTRADIOL 111 05/20/2008         Past Medical History:   Diagnosis Date    Abnormal ECG 05/02/2016 "    5/2/2016: Anterior T wave inversion. Normal stress echo 2016 Dr Grossman    Allergic rhinitis     ENT DR Meade, flonase bid    Bilateral hand pain 04/05/2022    Bunion of right foot 04/25/2019    MTP R 1st, [place]    Chronic gastric ulcer with hemorrhage 04/05/2022    Due to NSAIDS for L remington , sacroiliitis    Chronic left shoulder pain 04/18/2018    Endometrial cancer     Fever blister 04/18/2018    Gallbladder sludge 04/2014    general surgeon Dr Maxx Paige at [facility]    Gastric polyps     EGD 2015 Dr Brian    Hormone replacement therapy 04/05/2022    Postmenopausal vaginal bleeding 04/18/2018    Endometrial biopsy & tx by GYN Marv Smith 04/18/2018    Topical metronidazole    Seasonal allergic rhinitis due to pollen 04/25/2019    flonase daily, allergy testing in past, ENT Vaibhav    Seronegative spondyloarthropathy 05/02/2016    10/2015: Diagnosed.    Subclinical iodine-deficiency hypothyroidism 04/05/2022     Past Surgical History:   Procedure Laterality Date    APPENDECTOMY      laparoscopic same time as breezy    CHOLECYSTECTOMY      CYSTOSCOPY  02/28/2024    Procedure: CYSTOSCOPY;  Surgeon: Jodie Velazquez MD;  Location: [facility] OR;  Service: OB/GYN;;    HYSTERECTOMY      LYMPH NODE BIOPSY Bilateral 02/28/2024    Procedure: BIOPSY, LYMPH NODE;  Surgeon: Jodie Velazquez MD;  Location: [facility] OR;  Service: OB/GYN;  Laterality: Bilateral;    MAPPING, LYMPH NODE, SENTINEL Bilateral 02/28/2024    Procedure: MAPPING, LYMPH NODE, SENTINEL;  Surgeon: Jodie Velazquez MD;  Location: [facility] OR;  Service: OB/GYN;  Laterality: Bilateral;    ROBOT-ASSISTED LAPAROSCOPIC ABDOMINAL HYSTERECTOMY USING DA DULCE MARIA XI N/A 02/28/2024    Procedure: XI ROBOTIC HYSTERECTOMY;  Surgeon: Jodie Velazquez MD;  Location: [facility] OR;  Service: OB/GYN;  Laterality: N/A;  2 hr case    ROBOT-ASSISTED LAPAROSCOPIC SALPINGO-OOPHORECTOMY USING DA DULCE MARIA XI Bilateral 02/28/2024    Procedure: XI ROBOTIC SALPINGO-OOPHORECTOMY;  Surgeon:  "Jodie Velazquez MD;  Location: [facility];  Service: OB/GYN;  Laterality: Bilateral;    SALIVARY GLAND SURGERY      R parotid, August 2012, Dr Meade ENT    SURGICAL REMOVAL OF BONE SPUR       Vitals:    06/11/24 0835   BP: 128/78   Pulse: 73   Temp: 98.8 °F (37.1 °C)   TempSrc: Oral   SpO2: 98%   Weight: 62.4 kg (137 lb 9.1 oz)   Height: 5' 5" (1.651 m)   PainSc:   2   PainLoc: Abdomen     Wt Readings from Last 5 Encounters:   06/11/24 62.4 kg (137 lb 9.1 oz)   06/03/24 62.1 kg (136 lb 14.5 oz)   05/31/24 62.6 kg (138 lb)   05/24/24 62.3 kg (137 lb 5.6 oz)   04/24/24 64 kg (141 lb)     Objective:   Physical Exam  Cardiovascular:      Rate and Rhythm: Normal rate and regular rhythm.      Heart sounds: Normal heart sounds.   Pulmonary:      Effort: Pulmonary effort is normal. No respiratory distress.      Breath sounds: Normal breath sounds.   Abdominal:      General: Abdomen is flat. Bowel sounds are normal. There is no distension.      Palpations: Abdomen is soft. There is no mass.      Tenderness: There is no abdominal tenderness. There is no guarding or rebound.      Hernia: No hernia is present.      Comments: Well healed scars from breezy  No CVA tenderness   Genitourinary:     Comments: nml rectal tone, no hemorrhoids, no fissure, no irritation, no rash, no mass, no stool in vault    Psychiatric:         Behavior: Behavior normal.         Thought Content: Thought content normal.         Judgment: Judgment normal.                                     "

## 2024-06-11 NOTE — ASSESSMENT & PLAN NOTE
Per Dr Butler (Pulm recommdndations):  Eliquis 6 mo until Oct 2024 (from provoked PE Hyst surgery  2/2024 for Endometrial CA, taking HRT, flight to [facility])

## 2024-06-11 NOTE — ASSESSMENT & PLAN NOTE
Since Feb 28 ,2024 surgery, that incision was very sore (surgeon said there was lots of scar tissue around gallbladder, but pt did not have abd surgery other than appendectomy), pain under Right rib & to R flank & back, nauseated. Sat I thought about going to ER. Pain severe & lasts briefly, but then subsides.     Had Cholecystectomy in past, but this nausea is similar to when she had Cholecystitis    Had Appendectomy    Does not take daily NSAID. Did have NSAID gastric ulcer in the past.     Urinalysis (look for blood for possible kidney stone)  RUQ abd US  CMP  HIDA

## 2024-06-17 ENCOUNTER — CLINICAL SUPPORT (OUTPATIENT)
Dept: REHABILITATION | Facility: HOSPITAL | Age: 69
End: 2024-06-17
Payer: MEDICARE

## 2024-06-17 DIAGNOSIS — M79.642 BILATERAL HAND PAIN: ICD-10-CM

## 2024-06-17 DIAGNOSIS — M79.641 BILATERAL HAND PAIN: ICD-10-CM

## 2024-06-17 DIAGNOSIS — M54.50 CHRONIC LOW BACK PAIN, UNSPECIFIED BACK PAIN LATERALITY, UNSPECIFIED WHETHER SCIATICA PRESENT: ICD-10-CM

## 2024-06-17 DIAGNOSIS — G89.29 CHRONIC LOW BACK PAIN, UNSPECIFIED BACK PAIN LATERALITY, UNSPECIFIED WHETHER SCIATICA PRESENT: ICD-10-CM

## 2024-06-17 DIAGNOSIS — R51.9 CHRONIC NONINTRACTABLE HEADACHE, UNSPECIFIED HEADACHE TYPE: ICD-10-CM

## 2024-06-17 DIAGNOSIS — G89.29 CHRONIC NONINTRACTABLE HEADACHE, UNSPECIFIED HEADACHE TYPE: ICD-10-CM

## 2024-06-17 DIAGNOSIS — C54.1 ENDOMETRIAL CANCER: ICD-10-CM

## 2024-06-17 DIAGNOSIS — M54.59 OTHER LOW BACK PAIN: Primary | ICD-10-CM

## 2024-06-17 PROCEDURE — 97811 ACUP 1/> W/O ESTIM EA ADD 15: CPT | Mod: PN | Performed by: ACUPUNCTURIST

## 2024-06-17 PROCEDURE — 97813 ACUP 1/> W/ESTIM 1ST 15 MIN: CPT | Mod: PN | Performed by: ACUPUNCTURIST

## 2024-06-18 ENCOUNTER — HOSPITAL ENCOUNTER (OUTPATIENT)
Dept: RADIOLOGY | Facility: HOSPITAL | Age: 69
Discharge: HOME | End: 2024-06-18
Attending: ORTHOPAEDIC SURGERY
Payer: MEDICARE

## 2024-06-18 ENCOUNTER — OFFICE VISIT (OUTPATIENT)
Dept: ORTHOPEDICS | Facility: CLINIC | Age: 69
End: 2024-06-18
Payer: MEDICARE

## 2024-06-18 VITALS — HEIGHT: 65 IN | WEIGHT: 137.56 LBS | BODY MASS INDEX: 22.92 KG/M2

## 2024-06-18 DIAGNOSIS — M79.642 BILATERAL HAND PAIN: ICD-10-CM

## 2024-06-18 DIAGNOSIS — M79.641 BILATERAL HAND PAIN: Primary | ICD-10-CM

## 2024-06-18 DIAGNOSIS — M18.11 ARTHRITIS OF CARPOMETACARPAL (CMC) JOINT OF RIGHT THUMB: Primary | ICD-10-CM

## 2024-06-18 DIAGNOSIS — M18.12 ARTHRITIS OF CARPOMETACARPAL (CMC) JOINT OF LEFT THUMB: ICD-10-CM

## 2024-06-18 DIAGNOSIS — M79.642 BILATERAL HAND PAIN: Primary | ICD-10-CM

## 2024-06-18 DIAGNOSIS — M79.641 BILATERAL HAND PAIN: ICD-10-CM

## 2024-06-18 PROCEDURE — 99203 OFFICE O/P NEW LOW 30 MIN: CPT | Mod: S$PBB,25,, | Performed by: ORTHOPAEDIC SURGERY

## 2024-06-18 PROCEDURE — 99213 OFFICE O/P EST LOW 20 MIN: CPT | Mod: PBBFAC,25 | Performed by: ORTHOPAEDIC SURGERY

## 2024-06-18 PROCEDURE — 99999PBSHW PR PBB SHADOW TECHNICAL ONLY FILED TO HB: Mod: PBBFAC,,,

## 2024-06-18 PROCEDURE — 99999 PR PBB SHADOW E&M-EST. PATIENT-LVL III: CPT | Mod: PBBFAC,,, | Performed by: ORTHOPAEDIC SURGERY

## 2024-06-18 PROCEDURE — 73130 X-RAY EXAM OF HAND: CPT | Mod: 26,50,, | Performed by: RADIOLOGY

## 2024-06-18 PROCEDURE — 73130 X-RAY EXAM OF HAND: CPT | Mod: TC,50

## 2024-06-18 PROCEDURE — 20600 DRAIN/INJ JOINT/BURSA W/O US: CPT | Mod: PBBFAC,LT | Performed by: ORTHOPAEDIC SURGERY

## 2024-06-18 PROCEDURE — 20600 DRAIN/INJ JOINT/BURSA W/O US: CPT | Mod: PBBFAC,50 | Performed by: ORTHOPAEDIC SURGERY

## 2024-06-18 RX ORDER — TRIAMCINOLONE ACETONIDE 40 MG/ML
40 INJECTION, SUSPENSION INTRA-ARTICULAR; INTRAMUSCULAR
Status: DISCONTINUED | OUTPATIENT
Start: 2024-06-18 | End: 2024-06-18 | Stop reason: HOSPADM

## 2024-06-18 RX ADMIN — TRIAMCINOLONE ACETONIDE 40 MG: 40 INJECTION, SUSPENSION INTRA-ARTICULAR; INTRAMUSCULAR at 09:06

## 2024-06-18 NOTE — PROGRESS NOTES
Acupuncture Evaluation Note     [facility]  [facility] Number: [id number]    Traditional Chinese Medicine (TCM) Diagnosis: Qi Stagnation, Blood Stasis, and Blood Deficiency  Medical Diagnosis:   Encounter Diagnoses   Name Primary?    Other low back pain Yes    Chronic low back pain, unspecified back pain laterality, unspecified whether sciatica present     Bilateral hand pain     Chronic nonintractable headache, unspecified headache type     Endometrial cancer         Evaluation Date: 6/18/2024    Visit #/Visits authorized: 12, 2/12     Precautions: blood thinners and cancer    Subjective     Chief Concern: cLBP onset 1996, osteopenia and DDD in lumbar spine. Back pain has worsened lately in lower right side, radiating bilaterally into hips. Severe bilateral hand pain onset 3 years ago with use, worsened in January 2024. Pain begins at base of thumb joint and radiates into digit joints and wrists.     Medical necessity is demonstrated by the following IMPAIRMENTS: Medical Necessity: Decreased mobility limits day to day activities, social, and emergent situations and Decreased quality of life              Aggravating Factors:  movement, flexion, extension, and rotation internal and external   Relieving Factors:  ice    Symptom Description:     Quality:  Sharp and shooting, Tight   Severity:  9  Frequency:  continuously      Objective       New Findings:      Treatment     Treatment Principles:  Move qi, nourish blood, stop pain     Acupuncture points used:  4 LUU and BA KAVIN     Bilateral points: SP9, SP6, ST36, GB34, LI11, KD3, LU7, KD6, LU10  Unilateral points: CV12, ST21  Auricular Treatment:      Needles In: 30  Needles Out: 30  Needles W/ STIM placed: 3:20  Needles W/ STIM removed: 3:50      Other Traditional Chinese Medicine Modalities -  heat lamp     Assessment     After treatment, patient felt hand pain present throughout the week. Swelling in ankles and feet last night.     Patient prognosis is  Good.     Patient will continue to benefit from acupuncture treatment to address the deficits listed in the problem list box on initial evaluation, provide patient family education and to maximize pt's level of independence in the home and community environment.     Patient's spiritual, cultural and educational needs considered and pt agreeable to plan of care and goals.     Anticipated barriers to treatment: none    Plan     Recommend 1 /week for 4 sessions then re-assess.      Education:  Patient is aware of cumulative benefit of acupuncture

## 2024-06-18 NOTE — PROCEDURES
Small Joint Aspiration/Injection: L thumb CMC    Date/Time: 6/18/2024 9:00 AM    Performed by: Fadi Rucker MD  Authorized by: Fadi Rucker MD    Consent Done?:  Yes (Verbal)  Indications:  Pain  Site marked: the procedure site was marked    Timeout: prior to procedure the correct patient, procedure, and site was verified    Prep: patient was prepped and draped in usual sterile fashion      Location:  Thumb  Site:  L thumb CMC  Ultrasonic guidance for needle placement?: No    Needle size:  25 G  Approach:  Dorsal  Medications:  40 mg triamcinolone acetonide 40 mg/mL  Patient tolerance:  Patient tolerated the procedure well with no immediate complications

## 2024-06-18 NOTE — PROCEDURES
Small Joint Aspiration/Injection: R thumb CMC    Date/Time: 6/18/2024 9:00 AM    Performed by: Fadi Rucker MD  Authorized by: Fadi Rucker MD    Consent Done?:  Yes (Verbal)  Indications:  Pain  Site marked: the procedure site was marked    Timeout: prior to procedure the correct patient, procedure, and site was verified    Prep: patient was prepped and draped in usual sterile fashion      Local anesthesia used?: Yes    Local anesthetic:  Topical anesthetic  Location:  Thumb  Site:  R thumb CMC  Ultrasonic guidance for needle placement?: No    Needle size:  25 G  Approach:  Dorsal  Medications:  40 mg triamcinolone acetonide 40 mg/mL  Patient tolerance:  Patient tolerated the procedure well with no immediate complications

## 2024-06-18 NOTE — PROGRESS NOTES
[facility]  History & Physical  Orthopedics    SUBJECTIVE:      COVID-19 attestation:  This patient was treated during the COVID-19 pandemic.  This was discussed with the patient, they are aware of our current policies and procedures, were given the option of delaying their visit and or switching to a virtual visit, delaying their surgery when applicable, and they elect to proceed.    Chief Complaint: bilateral hand pain (R>L)    Referring Provider: Kristan Shahid MD     History of Present Illness:  Patient is a 68 y.o. right hand dominant female who presents today with complaints of bilateral hand pain. She states she has had pain in the base of her thumbs for 3-4 years now and it comes and goes. She said just since February she has had intense pain and swelling of bilateral hands but a majority of her pain is in the thumbs. She used to do a lot of crocheting and knitting which would aggravate the her hands. Now, she is describing weakness and pain with everyday activities and increased pain if she overdoes it. She has been told in the past she has mild carpal tunnel back in 2021 or so. She has some mild numbness mainly in the right hand at nights but otherwise denies numbness and tingling.      The patient is a/an retired.    Onset of symptoms/DOI was 3-4 years ago, worsening over the past 6 months.    Symptoms are aggravated by activity and movement.    Symptoms are alleviated by rest and medication.    Symptoms consist of pain and swelling.    The patient rates their pain as a 5/10.    Attempted treatment(s) and/or interventions include activity modifications, rest, tylenol     The patient denies any fevers, chills, N/V, D/C and presents for evaluation.       Past Medical History:   Diagnosis Date    Abnormal ECG 05/02/2016 5/2/2016: Anterior T wave inversion. Normal stress echo 2016 Dr Grossman    Allergic rhinitis     ENT price [facility]    Bilateral hand pain 04/05/2022     Bunion of right foot 04/25/2019    MTP R 1st, [place]    Chronic gastric ulcer with hemorrhage 04/05/2022    Due to NSAIDS for L remington , sacroiliitis    Chronic left shoulder pain 04/18/2018    Endometrial cancer     Fever blister 04/18/2018    Gallbladder sludge 04/2014    general surgeon Dr Maxx Paige at [facility]    Gastric polyps     EGD 2015 Dr Brian    Hormone replacement therapy 04/05/2022    Postmenopausal vaginal bleeding 04/18/2018    Endometrial biopsy & tx by GYN Marv Smith 04/18/2018    Topical metronidazole    Seasonal allergic rhinitis due to pollen 04/25/2019    flonase daily, allergy testing in past, ENT Vaibhav    Seronegative spondyloarthropathy 05/02/2016    10/2015: Diagnosed.    Subclinical iodine-deficiency hypothyroidism 04/05/2022     Past Surgical History:   Procedure Laterality Date    APPENDECTOMY      laparoscopic same time as breezy    CHOLECYSTECTOMY      CYSTOSCOPY  02/28/2024    Procedure: CYSTOSCOPY;  Surgeon: Jodie Velazquez MD;  Location: [facility] OR;  Service: OB/GYN;;    HYSTERECTOMY      LYMPH NODE BIOPSY Bilateral 02/28/2024    Procedure: BIOPSY, LYMPH NODE;  Surgeon: Jodie Velazquez MD;  Location: [facility] OR;  Service: OB/GYN;  Laterality: Bilateral;    MAPPING, LYMPH NODE, SENTINEL Bilateral 02/28/2024    Procedure: MAPPING, LYMPH NODE, SENTINEL;  Surgeon: Jodie Velazquez MD;  Location: [facility] OR;  Service: OB/GYN;  Laterality: Bilateral;    ROBOT-ASSISTED LAPAROSCOPIC ABDOMINAL HYSTERECTOMY USING DA DULCE MARIA XI N/A 02/28/2024    Procedure: XI ROBOTIC HYSTERECTOMY;  Surgeon: Jodie Velazquez MD;  Location: [facility] OR;  Service: OB/GYN;  Laterality: N/A;  2 hr case    ROBOT-ASSISTED LAPAROSCOPIC SALPINGO-OOPHORECTOMY USING DA DULCE MARIA XI Bilateral 02/28/2024    Procedure: XI ROBOTIC SALPINGO-OOPHORECTOMY;  Surgeon: Jodie Velazquez MD;  Location: [facility] OR;  Service: OB/GYN;  Laterality: Bilateral;    SALIVARY GLAND SURGERY      R parotid, August 2012, Dr Meade ENT    SURGICAL  REMOVAL OF BONE SPUR       Review of patient's allergies indicates:   Allergen Reactions    Clindamycin Rash    Doxycycline     Nsaids (non-steroidal anti-inflammatory drug) Other (See Comments)     Gastric ulcer-bleeding    Oxycodone Nausea And Vomiting    Percocet [oxycodone-acetaminophen] Nausea And Vomiting     Can take tylenol    Sulfa (sulfonamide antibiotics) Rash     Social History     Social History Narrative    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,  , exercises on glider, 5 children, ETOH socially, colonoscopy with diverticulosis per pt 2010, GYN Marv     Family History   Problem Relation Name Age of Onset    Cancer Mother  80        CLL    Alzheimer's disease Mother  83    Atrial fibrillation Mother      Hypertension Mother      Cancer Father          pancreatic,  81    Diabetes Father      Heart failure Father      Fibromyalgia Daughter Floridalma     Ovarian cancer Neg Hx      Uterine cancer Neg Hx      Breast cancer Neg Hx      Colon cancer Neg Hx           Current Outpatient Medications:     b complex vitamins tablet, Take 1 tablet by mouth once daily., Disp: , Rfl:     beta-carotene,A,-vits C,E/mins (OCUVITE ORAL), Take by mouth., Disp: , Rfl:     ELIQUIS 5 mg Tab, Take 5 mg by mouth 2 (two) times daily., Disp: , Rfl:     Lactobacillus acidophilus (PROBIOTIC ORAL), Take 12.5 Billion Cells by mouth Daily. 1 capsule a day., Disp: , Rfl:     MULTIVITAMIN ORAL, Take 1 tablet by mouth 2 (two) times daily., Disp: , Rfl:     NP THYROID 120 mg Tab, Take 1 tablet by mouth every morning. Patient states she takes 90 mg., Disp: , Rfl:     UNABLE TO FIND, once daily. lumity, Disp: , Rfl:     UNABLE TO FIND, once daily. dosokap, Disp: , Rfl:     UNABLE TO FIND, once daily. omaprem, Disp: , Rfl:     UNABLE TO FIND, once daily. adrecor, Disp: , Rfl:     vit B6-L. thkoezov-kg-Y28-ALA (NUFOLA) 25 mg-3,500 mcg DFE-1 mg-300 mg Cap, Take 1 tablet by mouth once daily., Disp: ,  "Rfl:     VITAMIN D2-VITAMIN K1 ORAL, Take 1 tablet by mouth once daily., Disp: , Rfl:       Review of Systems:  As per HPI otherwise noncontributory    OBJECTIVE:      Vital Signs (Most Recent):  Vitals:    06/18/24 0916   Weight: 62.4 kg (137 lb 9.1 oz)   Height: 5' 5" (1.651 m)     Body mass index is 22.89 kg/m².      Physical Exam:  Constitutional: The patient appears well-developed and well-nourished. No distress.   Skin: No lesions appreciated  Head: Normocephalic and atraumatic.   Nose: Nose normal.   Ears: No deformities seen  Eyes: Conjunctivae and EOM are normal.   Neck: No tracheal deviation present.   Cardiovascular: Normal rate and intact distal pulses.    Pulmonary/Chest: Effort normal. No respiratory distress.   Abdominal: There is no guarding.   Neurological: The patient is alert.   Psychiatric: The patient has a normal mood and affect.     Bilateral Hand/Wrist Examination:    Observation/Inspection:  Swelling  none    Deformity  none  Discoloration  none     Scars   none    Atrophy  none    HAND/WRIST EXAMINATION:  Finkelstein's Test   Neg  WHAT Test    Neg  Snuff box tenderness   Neg  Juarez's Test    Neg  Hook of [place]ate Tenderness  Neg  CMC grind    Pos bilaterally  Circumduction test   Neg    Neurovascular Exam:  Digits WWP, brisk CR < 3s throughout  NVI motor/LTS to M/R/U nerves, radial pulse 2+  Tinel's Test - Carpal Tunnel  Neg  Tinel's Test - Cubital Tunnel  Neg  Phalen's Test    Neg  Median Nerve Compression Test Neg    ROM hand full, painless  TTP CMC bilaterally  R index finger A1 pulley tenderness    ROM wrist full, painless    ROM elbow full, painless    Abdomen not guarded  Respirations nonlabored  Perfusion intact    Diagnostic Results:     Imaging - I independently viewed the patient's imaging as well as the radiology report.  Xrays of the patient's bilateral hands demonstrate no evidence of any acute fractures or dislocations, she has bilateral CMC arthritis (R>L).     EMG - " none    ASSESSMENT/PLAN:      68 y.o. yo female with bilateral CMC arthritis, right index finger trigger finger  Plan: The patient and I had a thorough discussion today.  We discussed the working diagnosis as well as several other potential alternative diagnoses.  Treatment options were discussed, both conservative and surgical.  Conservative treatment options would include things such as activity modifications, workplace modifications, a period of rest, oral vs topical OTC and prescription anti-inflammatory medications, occupational therapy, splinting/bracing, immobilization, corticosteroid injections, and others.  Surgical options were discussed as well.     At this time, the patient would like to proceed with a trial of bilateral CMC injections today and bilateral thumb spica braces. She is not able to take NSAIDs because of stomach ulcers, recommend tylenol as needed for continued pain. Weightbearing as tolerated, range of motion as tolerated.    Should the patient's symptoms worsen, persist, or fail to improve they should return for reevaluation and I would be happy to see them back anytime.        Fadi Rucker M.D.    Please be aware that this note has been generated with the assistance of [employer] voice-to-text.  Please excuse any spelling or grammatical errors.    Thank you for choosing Dr. Fadi Rucker for your orthopedic hand and upper extremity care. It is our goal to provide you with exceptional care that will help keep you healthy, active, and get you back in the game.     If you felt that you received exemplary care today, please consider leaving feedback for Dr. Rucker on [email or web address] at [email or web address].    Please do not hesitate to reach out to us via email, phone, or MyChart with any questions, concerns, or feedback.

## 2024-06-20 ENCOUNTER — HOSPITAL ENCOUNTER (OUTPATIENT)
Dept: RADIOLOGY | Facility: HOSPITAL | Age: 69
Discharge: HOME | End: 2024-06-20
Attending: FAMILY MEDICINE
Payer: MEDICARE

## 2024-06-20 DIAGNOSIS — R10.11 RIGHT UPPER QUADRANT ABDOMINAL PAIN: ICD-10-CM

## 2024-06-20 DIAGNOSIS — Z90.49 HISTORY OF CHOLECYSTECTOMY: ICD-10-CM

## 2024-06-20 PROCEDURE — 76705 ECHO EXAM OF ABDOMEN: CPT | Mod: TC

## 2024-06-20 PROCEDURE — 76705 ECHO EXAM OF ABDOMEN: CPT | Mod: 26,,, | Performed by: RADIOLOGY

## 2024-07-02 ENCOUNTER — CLINICAL SUPPORT (OUTPATIENT)
Dept: REHABILITATION | Facility: HOSPITAL | Age: 69
End: 2024-07-02
Payer: MEDICARE

## 2024-07-02 DIAGNOSIS — G89.29 CHRONIC LOW BACK PAIN, UNSPECIFIED BACK PAIN LATERALITY, UNSPECIFIED WHETHER SCIATICA PRESENT: ICD-10-CM

## 2024-07-02 DIAGNOSIS — M54.50 CHRONIC LOW BACK PAIN, UNSPECIFIED BACK PAIN LATERALITY, UNSPECIFIED WHETHER SCIATICA PRESENT: ICD-10-CM

## 2024-07-02 DIAGNOSIS — M54.59 OTHER LOW BACK PAIN: Primary | ICD-10-CM

## 2024-07-02 DIAGNOSIS — G89.29 CHRONIC NONINTRACTABLE HEADACHE, UNSPECIFIED HEADACHE TYPE: ICD-10-CM

## 2024-07-02 DIAGNOSIS — M79.642 BILATERAL HAND PAIN: ICD-10-CM

## 2024-07-02 DIAGNOSIS — M79.641 BILATERAL HAND PAIN: ICD-10-CM

## 2024-07-02 DIAGNOSIS — C54.1 ENDOMETRIAL CANCER: ICD-10-CM

## 2024-07-02 DIAGNOSIS — R51.9 CHRONIC NONINTRACTABLE HEADACHE, UNSPECIFIED HEADACHE TYPE: ICD-10-CM

## 2024-07-02 PROCEDURE — 97813 ACUP 1/> W/ESTIM 1ST 15 MIN: CPT | Mod: PN | Performed by: ACUPUNCTURIST

## 2024-07-02 PROCEDURE — 97811 ACUP 1/> W/O ESTIM EA ADD 15: CPT | Mod: PN | Performed by: ACUPUNCTURIST

## 2024-07-02 NOTE — PROGRESS NOTES
Acupuncture Evaluation Note     [facility]  [facility] Number: [id number]    Traditional Chinese Medicine (TCM) Diagnosis: Qi Stagnation, Blood Stasis, and Blood Deficiency  Medical Diagnosis:   No diagnosis found.       Evaluation Date: 7/2/2024    Visit #/Visits authorized: 12, 3/12     Precautions: blood thinners and cancer    Subjective     Chief Concern: cLBP onset 1996, osteopenia and DDD in lumbar spine. Back pain has worsened lately in lower right side, radiating bilaterally into hips. Severe bilateral hand pain onset 3 years ago with use, worsened in January 2024. Pain begins at base of thumb joint and radiates into digit joints and wrists.     Medical necessity is demonstrated by the following IMPAIRMENTS: Medical Necessity: Decreased mobility limits day to day activities, social, and emergent situations and Decreased quality of life              Aggravating Factors:  movement, flexion, extension, and rotation internal and external   Relieving Factors:  ice    Symptom Description:     Quality:  Sharp and shooting, Tight   Severity:  9  Frequency:  continuously      Objective       New Findings:  recurrent sinusitis -  post-nasal drip with cough (feels cough more in the throat than lungs). Ears chronically blocked with crackling sound. Tinnitus in both ears.     Treatment     Treatment Principles:  Move qi, nourish blood, stop pain     Acupuncture points used:  4 LUU, Gb34, Ki3, Ki6, Li20, Lu7, Sp6, Sp9, St36, BUFFY ESPINOSA, and YIN RIVERA    Bilateral points: LU10, LU9, BL7, TW17  Unilateral points:   Auricular Treatment:      Needles In: 30  Needles Out: 30  [place] W/ STIM placed: 2:50  Needles W/O STIM removed: 3:20      Other Traditional Chinese Medicine Modalities -  heat lamp     Assessment     After treatment, patient felt abdominal pain and nausea improved. Wrist pain has improved with epidural shot, pain remains at LU9.      Patient prognosis is Good.     Patient will continue to benefit from  acupuncture treatment to address the deficits listed in the problem list box on initial evaluation, provide patient family education and to maximize pt's level of independence in the home and community environment.     Patient's spiritual, cultural and educational needs considered and pt agreeable to plan of care and goals.     Anticipated barriers to treatment: none    Plan     Recommend 1 /week for 3 sessions then re-assess.      Education:  Patient is aware of cumulative benefit of acupuncture

## 2024-07-08 NOTE — TELEPHONE ENCOUNTER
----- Message from Alysia Girard sent at 7/8/2024  1:31 PM CDT -----  Contact: [phone]  [email or web address]     Patient is calling for Medical Advice regarding:needs a refill     How long has patient had these symptoms:    Pharmacy name and phone#:  [id number] - [place] - [address]  [address]  [place] [address]  Phone: [phone] Fax: [phone]       Patient wants a call back or thru [facility]:call back     Comments:  Pt is needing this filled and it is not in her chart for a cold sore   Valacyclovir 1 gram tablet     Please advise patient replies from provider may take up to 48 hours.

## 2024-07-08 NOTE — TELEPHONE ENCOUNTER
No care due was identified.  [facility] Embedded Care Due Messages. Reference number: [id number].   7/08/2024 2:22:22 PM CDT

## 2024-07-09 ENCOUNTER — CLINICAL SUPPORT (OUTPATIENT)
Dept: REHABILITATION | Facility: HOSPITAL | Age: 69
End: 2024-07-09
Payer: MEDICARE

## 2024-07-09 DIAGNOSIS — M79.641 BILATERAL HAND PAIN: ICD-10-CM

## 2024-07-09 DIAGNOSIS — G89.29 CHRONIC NONINTRACTABLE HEADACHE, UNSPECIFIED HEADACHE TYPE: ICD-10-CM

## 2024-07-09 DIAGNOSIS — M54.59 OTHER LOW BACK PAIN: Primary | ICD-10-CM

## 2024-07-09 DIAGNOSIS — M54.50 CHRONIC LOW BACK PAIN, UNSPECIFIED BACK PAIN LATERALITY, UNSPECIFIED WHETHER SCIATICA PRESENT: ICD-10-CM

## 2024-07-09 DIAGNOSIS — C54.1 ENDOMETRIAL CANCER: ICD-10-CM

## 2024-07-09 DIAGNOSIS — M79.642 BILATERAL HAND PAIN: ICD-10-CM

## 2024-07-09 DIAGNOSIS — G89.29 CHRONIC LOW BACK PAIN, UNSPECIFIED BACK PAIN LATERALITY, UNSPECIFIED WHETHER SCIATICA PRESENT: ICD-10-CM

## 2024-07-09 DIAGNOSIS — R51.9 CHRONIC NONINTRACTABLE HEADACHE, UNSPECIFIED HEADACHE TYPE: ICD-10-CM

## 2024-07-09 PROCEDURE — 97813 ACUP 1/> W/ESTIM 1ST 15 MIN: CPT | Mod: PN | Performed by: ACUPUNCTURIST

## 2024-07-09 PROCEDURE — 97811 ACUP 1/> W/O ESTIM EA ADD 15: CPT | Mod: PN | Performed by: ACUPUNCTURIST

## 2024-07-09 RX ORDER — VALACYCLOVIR HYDROCHLORIDE 1 G/1
TABLET, FILM COATED ORAL
Qty: 12 TABLET | Refills: 3 | Status: SHIPPED | OUTPATIENT
Start: 2024-07-09

## 2024-07-11 NOTE — PROGRESS NOTES
Acupuncture Evaluation Note     [facility]  [facility] Number: [id number]    Traditional Chinese Medicine (TCM) Diagnosis: Qi Stagnation, Blood Stasis, and Blood Deficiency  Medical Diagnosis:   Encounter Diagnoses   Name Primary?    Other low back pain Yes    Chronic low back pain, unspecified back pain laterality, unspecified whether sciatica present     Bilateral hand pain     Chronic nonintractable headache, unspecified headache type     Endometrial cancer           Evaluation Date: 7/11/2024    Visit #/Visits authorized: 12, 4/12     Precautions: blood thinners and cancer    Subjective     Chief Concern: cLBP onset 1996, osteopenia and DDD in lumbar spine. Back pain has worsened lately in lower right side, radiating bilaterally into hips. Severe bilateral hand pain onset 3 years ago with use, worsened in January 2024. Pain begins at base of thumb joint and radiates into digit joints and wrists.     Medical necessity is demonstrated by the following IMPAIRMENTS: Medical Necessity: Decreased mobility limits day to day activities, social, and emergent situations and Decreased quality of life              Aggravating Factors:  movement, flexion, extension, and rotation internal and external   Relieving Factors:  ice    Symptom Description:     Quality:  Sharp and shooting, Tight   Severity:  9  Frequency:  continuously      Objective       New Findings: Shoulder/neck pain - GB21 to GB20.  recurrent sinusitis -  post-nasal drip with cough (feels cough more in the throat than lungs). Ears chronically blocked with crackling sound. Tinnitus in both ears.     Treatment     Treatment Principles:  Move qi, nourish blood, stop pain     Acupuncture points used:  Gb20, Gb21, Ki3, Ki6, Lu7, and Sp6    Bilateral points: TW17, TW15, BL13, BL15, BL23  Unilateral points: SI14  Auricular Treatment:      Needles In: 20  Needles Out: 20  Needles W/ STIM placed: 1:20  Needles W/O STIM removed: 1:50      Other Traditional  Chinese Medicine Modalities -  heat lamp     Assessment     After treatment, patient felt improvement in sinuses and ear blockage    Patient prognosis is Good.     Patient will continue to benefit from acupuncture treatment to address the deficits listed in the problem list box on initial evaluation, provide patient family education and to maximize pt's level of independence in the home and community environment.     Patient's spiritual, cultural and educational needs considered and pt agreeable to plan of care and goals.     Anticipated barriers to treatment: none    Plan     Recommend 1 /week for 2 sessions then re-assess.      Education:  Patient is aware of cumulative benefit of acupuncture

## 2024-07-15 ENCOUNTER — CLINICAL SUPPORT (OUTPATIENT)
Dept: REHABILITATION | Facility: HOSPITAL | Age: 69
End: 2024-07-15
Payer: MEDICARE

## 2024-07-15 DIAGNOSIS — M54.59 OTHER LOW BACK PAIN: Primary | ICD-10-CM

## 2024-07-15 DIAGNOSIS — M79.642 BILATERAL HAND PAIN: ICD-10-CM

## 2024-07-15 DIAGNOSIS — G89.29 CHRONIC LOW BACK PAIN, UNSPECIFIED BACK PAIN LATERALITY, UNSPECIFIED WHETHER SCIATICA PRESENT: ICD-10-CM

## 2024-07-15 DIAGNOSIS — G89.29 CHRONIC NONINTRACTABLE HEADACHE, UNSPECIFIED HEADACHE TYPE: ICD-10-CM

## 2024-07-15 DIAGNOSIS — C54.1 ENDOMETRIAL CANCER: ICD-10-CM

## 2024-07-15 DIAGNOSIS — M79.641 BILATERAL HAND PAIN: ICD-10-CM

## 2024-07-15 DIAGNOSIS — R51.9 CHRONIC NONINTRACTABLE HEADACHE, UNSPECIFIED HEADACHE TYPE: ICD-10-CM

## 2024-07-15 DIAGNOSIS — M54.50 CHRONIC LOW BACK PAIN, UNSPECIFIED BACK PAIN LATERALITY, UNSPECIFIED WHETHER SCIATICA PRESENT: ICD-10-CM

## 2024-07-15 PROCEDURE — 97813 ACUP 1/> W/ESTIM 1ST 15 MIN: CPT | Mod: PN | Performed by: ACUPUNCTURIST

## 2024-07-15 PROCEDURE — 97811 ACUP 1/> W/O ESTIM EA ADD 15: CPT | Mod: PN | Performed by: ACUPUNCTURIST

## 2024-07-15 NOTE — PROGRESS NOTES
Acupuncture Evaluation Note     [facility]  [facility] Number: [id number]    Traditional Chinese Medicine (TCM) Diagnosis: Qi Stagnation, Blood Stasis, and Blood Deficiency  Medical Diagnosis:   Encounter Diagnoses   Name Primary?    Other low back pain Yes    Chronic low back pain, unspecified back pain laterality, unspecified whether sciatica present     Bilateral hand pain     Chronic nonintractable headache, unspecified headache type     Endometrial cancer           Evaluation Date: 7/15/2024    Visit #/Visits authorized: 12, 5/12     Precautions: blood thinners and cancer    Subjective     Chief Concern: cLBP onset 1996, osteopenia and DDD in lumbar spine. Back pain has worsened lately in lower right side, radiating bilaterally into hips. Bilateral joint pain in wrists (LU9) onset 3 years ago with use, worsened in January 2024. Pain begins at base of thumb joint and radiates into digit joints and wrists.     Medical necessity is demonstrated by the following IMPAIRMENTS: Medical Necessity: Decreased mobility limits day to day activities, social, and emergent situations and Decreased quality of life              Aggravating Factors:  movement, flexion, extension, and rotation internal and external   Relieving Factors:  ice    Symptom Description:     Quality:  Sharp and shooting, Tight   Severity:  9  Frequency:  continuously      Objective       New Findings: Shoulder/neck pain - GB21 to GB20.  recurrent sinusitis -  post-nasal drip with cough (feels cough more in the throat than lungs). Ears chronically blocked with crackling sound. Tinnitus in both ears.     Treatment     Treatment Principles:  Move qi, nourish blood, stop pain     Acupuncture points used:  4 LUU, Du20, Gb41, Ki3, Ki6, Lu7, Pc6, Sp6, Sp9, St36, and YIN RIVERA    Bilateral points: LU9, LU10  Unilateral points: SP4  Auricular Treatment:      Needles In: 24  Needles Out: 24  [place] W/ STIM placed: 2:20  Needles W/O STIM removed:  2:50      Other Traditional Chinese Medicine Modalities -  heat lamp     Assessment     After treatment, patient felt improvement in neck/shoulder pain over the week. Some wrist pain returning at LU9, monitor and continue with maintenance care.     Patient prognosis is Good.     Patient will continue to benefit from acupuncture treatment to address the deficits listed in the problem list box on initial evaluation, provide patient family education and to maximize pt's level of independence in the home and community environment.     Patient's spiritual, cultural and educational needs considered and pt agreeable to plan of care and goals.     Anticipated barriers to treatment: none    Plan     Recommend 1 /week for 1 sessions then re-assess.      Education:  Patient is aware of cumulative benefit of acupuncture

## 2024-07-22 ENCOUNTER — CLINICAL SUPPORT (OUTPATIENT)
Dept: REHABILITATION | Facility: HOSPITAL | Age: 69
End: 2024-07-22
Payer: MEDICARE

## 2024-07-22 DIAGNOSIS — C54.1 ENDOMETRIAL CANCER: ICD-10-CM

## 2024-07-22 DIAGNOSIS — F43.29 STRESS AND ADJUSTMENT REACTION: ICD-10-CM

## 2024-07-22 DIAGNOSIS — R53.81 PHYSICAL DECONDITIONING: Primary | ICD-10-CM

## 2024-07-22 PROCEDURE — 97535 SELF CARE MNGMENT TRAINING: CPT

## 2024-07-22 PROCEDURE — 97165 OT EVAL LOW COMPLEX 30 MIN: CPT

## 2024-07-22 PROCEDURE — 97110 THERAPEUTIC EXERCISES: CPT

## 2024-07-22 NOTE — PROGRESS NOTES
[id number] [facility]  - lab site [address] [facility] [address]   [place], [address]  Get Directions  Tel: [phone]  Fax: [phone] [facility]   Occupational Therapy Initial Evaluation - Therapeutic Yoga    Date: 7/22/2024   Name: Caitlyn Prathermiguel  Clinic Number: [id number]    Therapy Diagnosis: No diagnosis found.  Physician: Dubinsky, Joanna L., PA*    Physician Orders: Eval and Treat   Medical Diagnosis from Referral:  Endometrial cancer [C54.1]      Evaluation Date: 7/22/2024  Authorization Period Expiration: 4/4/25  Plan of Care Expiration: 12/22/24  Progress Note Due: 10/22/24  Visit # / Visits authorized: 1/ 1     Precautions: Standard and cancer     Time In: 3:25 pm  Time Out: 4:15 pm  Total Appointment Time (timed & untimed codes): 50 minutes      SUBJECTIVE     Date of onset: 2/28/24    History of current condition: Caitlyn reports: After her diagnosis she underwent hysterectomy for endometrial cancer. There is no plan for adjuvant therapies.    Falls: none    Prior Therapy: yes, PT for left shoulder.  Social History:  lives with their spouse  Occupation: retired .  Currently working for [employer] about 10 hours per month.  Prior Level of Function: working part time, 24 hours.  Maintaining her home and preparing meals.  Moderate socializing with family and friends.  Current Level of Function: She reports significant fatigue and poor endurance affecting her function.  She is only able to work about 10 hours a week. Her  helps with housework ane they have hired  help now as well.  She also reports difficulty sleeping due to medication side effects as well as signfificant stress related to her diagnosis.    Pain:  Current 4/10, Multiple locations including hands, hips and low back.    Patients goals: 1. Increase my strength and endurance and get back to my pre cancer activities.  2. Sleep through the night.  3.  Learn ways to manage my stress and quiet my mind.  4. Learn how to do yoga to get stronger and more relaxed.     Medical History:   Past Medical History:   Diagnosis Date     Abnormal ECG 05/02/2016 5/2/2016: Anterior T wave inversion. Normal stress echo 2016 Dr Grossman    Allergic rhinitis     ENT DR Meade, flonase bid    Bilateral hand pain 04/05/2022    Bunion of right foot 04/25/2019    MTP R 1st, [place]    Chronic gastric ulcer with hemorrhage 04/05/2022    Due to NSAIDS for L nellyler , sacroiliitis    Chronic left shoulder pain 04/18/2018    Endometrial cancer     Fever blister 04/18/2018    Gallbladder sludge 04/2014    general surgeon Dr [facility]x [facility]    Gastric polyps     EGD 2015 Dr Brian    Hormone replacement therapy 04/05/2022    Postmenopausal vaginal bleeding 04/18/2018    Endometrial biopsy & tx by GYN Marv Smith 04/18/2018    Topical metronidazole    Seasonal allergic rhinitis due to pollen 04/25/2019    flonase daily, allergy testing in past, ENT Vaibhav    Seronegative spondyloarthropathy 05/02/2016    10/2015: Diagnosed.    Subclinical iodine-deficiency hypothyroidism 04/05/2022       Surgical History:   Caitlyn Grace  has a past surgical history that includes Salivary gland surgery; Cholecystectomy; Appendectomy; Surgical removal of bone spur; Robot-assisted laparoscopic abdominal hysterectomy using da Osm Xi (N/A, 02/28/2024); Robot-assisted laparoscopic salpingo-oophorectomy using da Som Xi (Bilateral, 02/28/2024); mapping, lymph node, sentinel (Bilateral, 02/28/2024); Lymph node biopsy (Bilateral, 02/28/2024); Cystoscopy (02/28/2024); and Hysterectomy.    Medications:   Caitlyn has a current medication list which includes the following prescription(s): b complex vitamins, beta-carotene(a)-vits c,e/mins, eliquis, lactobacillus acidophilus, multivitamin, np thyroid, UNABLE TO FIND, UNABLE TO FIND, UNABLE TO FIND, UNABLE TO FIND, valacyclovir, nufola, and vitamin d2/vitamin k1.    Allergies:   Review of patient's allergies indicates:   Allergen Reactions    Clindamycin Rash    Doxycycline     Nsaids (non-steroidal  anti-inflammatory drug) Other (See Comments)     Gastric ulcer-bleeding    Oxycodone Nausea And Vomiting    Percocet [oxycodone-acetaminophen] Nausea And Vomiting     Can take tylenol    Sulfa (sulfonamide antibiotics) Rash          OBJECTIVE           Patient Specific Functional Scale:         Activity 7/22/24       Endurance  4       2.   sleep 3       3.  deconditioned 5       4.  stress 3       5.        6.        SCORE          Total Score = Sum of activity scores / number of activities  Minimum Detectable Change (90% CII) for average score = 2 points  Minimum Detectable Change (90% CI) for single activity score = 3 points    Lifestyle Questionnaire:      Lifestyle Response   Emotional Response to Health Status fair   Patient's Communication Skills good   Reported Eating Habits fair   Reported Sleeping Patterns poor   Reported Energy Level fair   Knowledge of Exercises & Fitness fair   Frequency of Exercise Sessions/Week <3       TREATMENT     Total Treatment time (time-based codes) separate from Evaluation: 15 minutes      Caitlyn received the treatments listed below:        Self care to develop relaxation skills for immune health for 15 minutes including: chair yoga:  Cat-Cow,forward bend, back bend, twist,             PATIENT EDUCATION AND HOME EXERCISES     Education provided:   - - physiology of yoga/meditation and immune health    Written Home Exercises Provided: yes. Exercises were reviewed and Caitlyn was able to demonstrate them prior to the end of the session.  Caitlyn demonstrated good  understanding of the education provided. See EMR under Patient Instructions for exercises provided during therapy sessions.    ASSESSMENT     Caitlyn is a 68 y.o. female referred to outpatient Occupational Therapy with a medical diagnosis of  Endometrial cancer [C54.1]    . Patient presents with the following impairments:      Deconditioning, Sleep Disturbance, and Poor Stress Coping Skills    Following medical record review,  it is determined that Caitlyn will benefit from skilled outpatient Occupational Therapy to address the impairments stated above and in the chart below in order to maximize functional activities. The following goals were discussed with the patient and patient is in agreement with them as addressed in the treatment plan. The patient's rehab potential is Good. Plan of care discussed with patient: Yes  Patient's spiritual, cultural and educational needs considered and patient is agreeable to the plan of care and goals as stated below:     Anticipated Barriers for therapy: none    Medical Necessity is demonstrated by the following    Profile and History Assessment of Occupational Performance Level of Clinical Decision Making Complexity Score   Occupational Profile:   Caitlyn Grace is a 68 y.o. female who lives with their family and is retired. Caitlyn has difficulty with  ADLs and IADLs as listed previously, which  Affecting herdaily functional abilities.      Comorbidities:    has a past medical history of Abnormal ECG, Allergic rhinitis, Bilateral hand pain, Bunion of right foot, Chronic gastric ulcer with hemorrhage, Chronic left shoulder pain, Endometrial cancer, Fever blister, Gallbladder sludge, Gastric polyps, Hormone replacement therapy, Postmenopausal vaginal bleeding, Rosacea, Seasonal allergic rhinitis due to pollen, Seronegative spondyloarthropathy, and Subclinical iodine-deficiency hypothyroidism.    Medical and Therapy History Review:   Brief               Performance Deficits    Physical:  Joint Mobility  Joint Stability  Muscle Power/Strength  Muscle Endurance  Pain    Cognitive:  No Deficits    Psychosocial:    No Deficits     Clinical Decision Making:  low    Assessment Process:  Problem-Focused Assessments    Modification/Need for Assistance:  Not Necessary    Intervention Selection:  Multiple Treatment Options       low  Based on PMHX, co morbidities , data from assessments and functional level of  assistance required with task and clinical presentation directly impacting function.         Goals:  Short Term Goals: 6 weeks      Goal # Goal Status   1 Patient will demonstrate independence with diaphragmatic breathing in sit and supine. Progressing   2 Pt. will identify resource for audio body scan to assist with stress management/immune health    3 Patient will identify 2 new stress coping skills for stress management/immune health. Progressing   4 Patient will identify activity pacing problems.  Patient will then implement new plan for daily activity to increase endurance for ADL's. Progressing      Long Term Goals: 12 weeks      Goal # Goal Status   1 Patient will demonstrate independence with yoga Home Exercise Program to increase strength and endurance for ADL's. Progressing   2 Patient will demonstrate independence with relaxation techniques to manage stress for immune health. Progressing   3 Patient will verbalize good understanding of stress/immune health relationship.  Progressing   4          PLAN   Plan of care Certification: 7/22/2024 to 12/22/24.    Outpatient Occupational Therapy 1 times weekly for 1 weeks to include the following interventions: Neuromuscular Re-ed, Patient Education, Self Care, Therapeutic Activities, and Therapeutic Exercise.     Katerin Loyola, TONI      I

## 2024-08-02 ENCOUNTER — PATIENT MESSAGE (OUTPATIENT)
Dept: PRIMARY CARE CLINIC | Facility: CLINIC | Age: 69
End: 2024-08-02
Payer: MEDICARE

## 2024-08-06 DIAGNOSIS — I26.94 MULTIPLE SUBSEGMENTAL PULMONARY EMBOLI WITHOUT ACUTE COR PULMONALE: Primary | ICD-10-CM

## 2024-08-21 ENCOUNTER — TELEPHONE (OUTPATIENT)
Dept: GYNECOLOGIC ONCOLOGY | Facility: CLINIC | Age: 69
End: 2024-08-21
Payer: MEDICARE

## 2024-08-27 ENCOUNTER — CLINICAL SUPPORT (OUTPATIENT)
Dept: REHABILITATION | Facility: HOSPITAL | Age: 69
End: 2024-08-27
Payer: MEDICARE

## 2024-08-27 DIAGNOSIS — G89.29 CHRONIC LOW BACK PAIN, UNSPECIFIED BACK PAIN LATERALITY, UNSPECIFIED WHETHER SCIATICA PRESENT: ICD-10-CM

## 2024-08-27 DIAGNOSIS — M54.59 OTHER LOW BACK PAIN: Primary | ICD-10-CM

## 2024-08-27 DIAGNOSIS — M79.642 BILATERAL HAND PAIN: ICD-10-CM

## 2024-08-27 DIAGNOSIS — M54.50 CHRONIC LOW BACK PAIN, UNSPECIFIED BACK PAIN LATERALITY, UNSPECIFIED WHETHER SCIATICA PRESENT: ICD-10-CM

## 2024-08-27 DIAGNOSIS — G89.29 CHRONIC NONINTRACTABLE HEADACHE, UNSPECIFIED HEADACHE TYPE: ICD-10-CM

## 2024-08-27 DIAGNOSIS — M79.641 BILATERAL HAND PAIN: ICD-10-CM

## 2024-08-27 DIAGNOSIS — R51.9 CHRONIC NONINTRACTABLE HEADACHE, UNSPECIFIED HEADACHE TYPE: ICD-10-CM

## 2024-08-27 DIAGNOSIS — C54.1 ENDOMETRIAL CANCER: ICD-10-CM

## 2024-08-27 PROCEDURE — 97811 ACUP 1/> W/O ESTIM EA ADD 15: CPT | Mod: PN | Performed by: ACUPUNCTURIST

## 2024-08-27 PROCEDURE — 97813 ACUP 1/> W/ESTIM 1ST 15 MIN: CPT | Mod: PN | Performed by: ACUPUNCTURIST

## 2024-08-27 NOTE — PROGRESS NOTES
Acupuncture Evaluation Note     [facility]  [facility] Number: [id number]    Traditional Chinese Medicine (TCM) Diagnosis: Qi Stagnation, Blood Stasis, and Blood Deficiency  Medical Diagnosis:   Encounter Diagnoses   Name Primary?    Other low back pain Yes    Chronic low back pain, unspecified back pain laterality, unspecified whether sciatica present     Bilateral hand pain     Chronic nonintractable headache, unspecified headache type     Endometrial cancer             Evaluation Date: 8/29/2024    Visit #/Visits authorized: 12, 6/12     Precautions: blood thinners and cancer    Subjective     Chief Concern: cLBP onset 1996, osteopenia and DDD in lumbar spine. Back pain has worsened lately in lower right side, radiating bilaterally into hips. Bilateral joint pain in wrists (LU9) onset 3 years ago with use, worsened in January 2024. Pain begins at base of thumb joint and radiates into digit joints and wrists.     Medical necessity is demonstrated by the following IMPAIRMENTS: Medical Necessity: Decreased mobility limits day to day activities, social, and emergent situations and Decreased quality of life              Aggravating Factors:  movement, flexion, extension, and rotation internal and external   Relieving Factors:  ice    Symptom Description:     Quality:  Sharp and shooting, Tight   Severity:  9  Frequency:  continuously      Objective       New Findings: sinus pressure in head, hands and feet joints sore, itchy skin around ankle, low appetite with nausea     Treatment     Treatment Principles:  Move qi, nourish blood, stop pain     Acupuncture points used:  4 LUU, Du20, Gb34, Ki3, Ki6, Li20, Lu7, Pc6, Sp6, Sp9, SSC, St36, and YIN RIVERA    Bilateral points: ST2  Unilateral points: SP4  Auricular Treatment:      Needles In: 29  Needles Out: 29  [place] W/ STIM placed: 2:20  Needles W/O STIM removed: 2:50      Other Traditional Chinese Medicine Modalities -  heat lamp     Assessment     After  treatment, patient felt improvement in neck/shoulder pain over the week. Hand joints sore    Patient prognosis is Good.     Patient will continue to benefit from acupuncture treatment to address the deficits listed in the problem list box on initial evaluation, provide patient family education and to maximize pt's level of independence in the home and community environment.     Patient's spiritual, cultural and educational needs considered and pt agreeable to plan of care and goals.     Anticipated barriers to treatment: none    Plan     Recommend 1 /week for 6 sessions then re-assess.      Education:  Patient is aware of cumulative benefit of acupuncture

## 2024-09-02 PROBLEM — I26.94 MULTIPLE SUBSEGMENTAL PULMONARY EMBOLI WITHOUT ACUTE COR PULMONALE: Status: RESOLVED | Noted: 2024-06-03 | Resolved: 2024-09-02

## 2024-09-03 ENCOUNTER — CLINICAL SUPPORT (OUTPATIENT)
Dept: REHABILITATION | Facility: HOSPITAL | Age: 69
End: 2024-09-03
Payer: MEDICARE

## 2024-09-03 DIAGNOSIS — F43.29 STRESS AND ADJUSTMENT REACTION: ICD-10-CM

## 2024-09-03 DIAGNOSIS — R53.81 PHYSICAL DECONDITIONING: Primary | ICD-10-CM

## 2024-09-03 PROCEDURE — 97110 THERAPEUTIC EXERCISES: CPT

## 2024-09-03 PROCEDURE — 97112 NEUROMUSCULAR REEDUCATION: CPT

## 2024-09-03 PROCEDURE — 97535 SELF CARE MNGMENT TRAINING: CPT

## 2024-09-03 NOTE — PROGRESS NOTES
[facility]  Occupational Therapy Treatment Note - Therapeutic Yoga Progam    Date: 9/3/2024  [facility]  Cl[facility] Number: [id number]    Therapy Diagnosis:   Encounter Diagnoses   Name Primary?    Physical deconditioning Yes    Stress and adjustment reaction      Physician: Dubinsky, Joanna L., PA*    Physician Orders: Physician Orders: Eval and Treat   Medical Diagnosis from Referral:  Endometrial cancer [C54.1]       Evaluation Date: 7/22/2024  Authorization Period Expiration: 4/4/25  Plan of Care Expiration: 12/22/24  Progress Note Due: 10/22/24  Visit # / Visits authorized: 1/ 12      Precautions: Standard and cancer     Time In: 1:45 pm  Time Out: 2:45 pm  Total Billable Time: 60 minutes    SUBJECTIVE     Pt reports: She enjoyd practicing her HEP but has questions.    She was compliant with home exercise program given last session.   Response to previous treatment:eval only  Functional change: eval only    Pain: 2/10  Location: Current 4/10, Multiple locations including hands, hips and low back.       Patients goals: 1. Increase my strength and endurance and get back to my pre cancer activities.  2. Sleep through the night.  3.  Learn ways to manage my stress and quiet my mind.  4. Learn how to do yoga to get stronger and more relaxed.       OBJECTIVE     Objective Measures updated at progress report unless specified.  Patient Specific Functional Scale:           Activity 7/22/24           Endurance  4           2.   sleep 3           3.  deconditioned 5           4.  stress 3           5.             6.             SCORE   3.75              Total Score = Sum of activity scores / number of activities  Minimum Detectable Change (90% CII) for average score = 2 points  Minimum Detectable Change (90% CI) for single activity score = 3 points      Treatment     Caitlyn received the treatments listed below:       Date 9/3/24        Therapeutic Yoga Exercises / Neuromuscular  Re-education 30 minutes  minutes  minutes  minutes  minutes  minutes   Seated Yoga   chair yoga:  Cat-Cow,forward bend, back bend, twist,         Quadruped         Supine Modified boat with block- 3 x 5 breaths, twist x 2, knee to chest flow,         Prone         standing Warrior 2, chair pose/volcano x 2, wide straddle forward fold, down dog with chair,                           Self-Care/Home Management  / Therapeutic Activities  30 minutes  minutes  minutes  minutes  minutes  minutes            Relaxation techniques Diaphragmatic breathing,(DB), body scan,        Restorative  Bridge and fish with block        Activity Pacing                           Stress Management/Education  - physiology of yoga/meditation and immune health                     Patient Education and Home Exercises      Education provided:   - - physiology of yoga/meditation and immune health  - Progress towards goals     Written Home Exercises Provided: yes.  Exercises were reviewed and Caitlyn was able to demonstrate them prior to the end of the session.  Caitlyn demonstrated good  understanding of the HEP provided. See EMR under Patient Instructions for exercises provided during therapy sessions.       Assessment       In today's session, Caitlyn was taught a yoga HEP consisting of strengthening, stretching, and balance yoga exercise.  She was also introduced to breathing and body scan techniques to assist with relaxation/immune health. She tolerated the session well and required maximum verbal and neuromuscular cues in all treatment.   Caitlyn is progressing well towards her goals and patient prognosis is Good. Patient will continue to benefit from skilled outpatient occupational therapy to address the deficits listed in the problem list on initial evaluation provide pt/family education and to maximize pt's level of independence in the home and community environment. Pt's spiritual, cultural and educational needs considered and pt agreeable to  plan of care and goals.    Anticipated barriers to occupational therapy: none    Goals:  Short Term Goals: 6 weeks      Goal # Goal Status   1 Patient will demonstrate independence with diaphragmatic breathing in sit and supine. Progressing   2 Pt. will identify resource for audio body scan to assist with stress management/immune health    3 Patient will identify 2 new stress coping skills for stress management/immune health. Progressing   4 Patient will identify activity pacing problems.  Patient will then implement new plan for daily activity to increase endurance for ADL's. Progressing      Long Term Goals: 12 weeks      Goal # Goal Status   1 Patient will demonstrate independence with yoga Home Exercise Program to increase strength and endurance for ADL's. Progressing   2 Patient will demonstrate independence with relaxation techniques to manage stress for immune health. Progressing   3 Patient will verbalize good understanding of stress/immune health relationship.  Progressing   4            PLAN     Plan of care Certification: 9/3/2024 to 12/22/24.    Outpatient Occupational Therapy 1 times weekly for 12 weeks to include the following interventions: Neuromuscular Re-ed, Patient Education, Self Care, Therapeutic Activities, and Therapeutic Exercise.     Katerin Loyola, OT

## 2024-09-16 ENCOUNTER — CLINICAL SUPPORT (OUTPATIENT)
Dept: REHABILITATION | Facility: HOSPITAL | Age: 69
End: 2024-09-16
Payer: MEDICARE

## 2024-09-16 DIAGNOSIS — C54.1 ENDOMETRIAL CANCER: ICD-10-CM

## 2024-09-16 DIAGNOSIS — G89.29 CHRONIC NONINTRACTABLE HEADACHE, UNSPECIFIED HEADACHE TYPE: ICD-10-CM

## 2024-09-16 DIAGNOSIS — G89.29 CHRONIC LOW BACK PAIN, UNSPECIFIED BACK PAIN LATERALITY, UNSPECIFIED WHETHER SCIATICA PRESENT: ICD-10-CM

## 2024-09-16 DIAGNOSIS — M79.641 BILATERAL HAND PAIN: ICD-10-CM

## 2024-09-16 DIAGNOSIS — R51.9 CHRONIC NONINTRACTABLE HEADACHE, UNSPECIFIED HEADACHE TYPE: ICD-10-CM

## 2024-09-16 DIAGNOSIS — M79.642 BILATERAL HAND PAIN: ICD-10-CM

## 2024-09-16 DIAGNOSIS — M54.59 OTHER LOW BACK PAIN: Primary | ICD-10-CM

## 2024-09-16 DIAGNOSIS — M54.50 CHRONIC LOW BACK PAIN, UNSPECIFIED BACK PAIN LATERALITY, UNSPECIFIED WHETHER SCIATICA PRESENT: ICD-10-CM

## 2024-09-16 PROCEDURE — 97810 ACUP 1/> WO ESTIM 1ST 15 MIN: CPT | Mod: PN | Performed by: ACUPUNCTURIST

## 2024-09-16 PROCEDURE — 97811 ACUP 1/> W/O ESTIM EA ADD 15: CPT | Mod: PN | Performed by: ACUPUNCTURIST

## 2024-09-17 ENCOUNTER — OFFICE VISIT (OUTPATIENT)
Dept: HEMATOLOGY/ONCOLOGY | Facility: CLINIC | Age: 69
End: 2024-09-17
Payer: MEDICARE

## 2024-09-17 DIAGNOSIS — R91.8 PULMONARY NODULES/LESIONS, MULTIPLE: ICD-10-CM

## 2024-09-17 DIAGNOSIS — C54.1 ENDOMETRIAL CANCER: ICD-10-CM

## 2024-09-17 DIAGNOSIS — I26.90 ACUTE SEPTIC PULMONARY EMBOLISM WITHOUT ACUTE COR PULMONALE: ICD-10-CM

## 2024-09-17 DIAGNOSIS — I26.94 MULTIPLE SUBSEGMENTAL PULMONARY EMBOLI WITHOUT ACUTE COR PULMONALE: Primary | ICD-10-CM

## 2024-09-17 PROCEDURE — 99215 OFFICE O/P EST HI 40 MIN: CPT | Mod: 95,,, | Performed by: INTERNAL MEDICINE

## 2024-09-17 RX ORDER — APIXABAN 5 MG/1
5 TABLET, FILM COATED ORAL 2 TIMES DAILY
Qty: 180 TABLET | Refills: 1 | Status: SHIPPED | OUTPATIENT
Start: 2024-09-17

## 2024-09-17 NOTE — PROGRESS NOTES
Acupuncture Evaluation Note     [facility]  [facility] Number: [id number]    Traditional Chinese Medicine (TCM) Diagnosis: Qi Stagnation, Blood Stasis, and Blood Deficiency  Medical Diagnosis:   Encounter Diagnoses   Name Primary?    Other low back pain Yes    Chronic low back pain, unspecified back pain laterality, unspecified whether sciatica present     Bilateral hand pain     Chronic nonintractable headache, unspecified headache type     Endometrial cancer             Evaluation Date: 9/17/2024    Visit #/Visits authorized: 12, 7/12     Precautions: blood thinners and cancer    Subjective     Chief Concern: cLBP onset 1996, osteopenia and DDD in lumbar spine. Back pain has worsened lately in lower right side, radiating bilaterally into hips. Bilateral joint pain in wrists (LU9) onset 3 years ago with use, worsened in January 2024. Pain begins at base of thumb joint and radiates into digit joints and wrists.     Medical necessity is demonstrated by the following IMPAIRMENTS: Medical Necessity: Decreased mobility limits day to day activities, social, and emergent situations and Decreased quality of life              Aggravating Factors:  movement, flexion, extension, and rotation internal and external   Relieving Factors:  ice    Symptom Description:     Quality:  Sharp and shooting, Tight   Severity:  9  Frequency:  continuously      Objective       New Findings: strong waves of nausea, joint pain in hands - thumb joint     Treatment     Treatment Principles:  Move qi, nourish blood, stop pain     Acupuncture points used:  4 LUU, Du20, Gb34, Ki3, Ki6, Li20, Lu7, Sp6, Sp9, St36, and YIN RIVERA    Bilateral points: LU10, LU9  Unilateral points: CV12  Auricular Treatment:      Needles In: 27  Needles Out: 27  Needles W/O STIM placed: 1:50  Needles W/O STIM removed: 2:20      Other Traditional Chinese Medicine Modalities -  heat lamp     Assessment     After treatment, patient felt pain in thumb joints  returning    Patient prognosis is Good.     Patient will continue to benefit from acupuncture treatment to address the deficits listed in the problem list box on initial evaluation, provide patient family education and to maximize pt's level of independence in the home and community environment.     Patient's spiritual, cultural and educational needs considered and pt agreeable to plan of care and goals.     Anticipated barriers to treatment: none    Plan     Recommend 1 /week for 5 sessions then re-assess.      Education:  Patient is aware of cumulative benefit of acupuncture

## 2024-09-17 NOTE — PROGRESS NOTES
Hematology and Medical Oncology   New Patient Consult     09/17/2024  Referred by:  Dr. Kristan Shahid    Reason For Referral: DVT's    Telemedicine Documentation:  The patient location is: home  The chief complaint leading to consultation is: DVTs    Visit type: audiovisual    Face to Face time with patient: 25  45 minutes of total time spent on the encounter, which includes face to face time and non-face to face time preparing to see the patient (eg, review of tests), Obtaining and/or reviewing separately obtained history, Documenting clinical information in the electronic or other health record, Independently interpreting results (not separately reported) and communicating results to the patient/family/caregiver, or Care coordination (not separately reported).         Each patient to whom he or she provides medical services by telemedicine is:  (1) informed of the relationship between the physician and patient and the respective role of any other health care provider with respect to management of the patient; and (2) notified that he or she may decline to receive medical services by telemedicine and may withdraw from such care at any time.    History of Present Ilness:   Caitlyn WILEY Edgarmiguel (Caitlyn) is a pleasant 68 y.o.female who presents virtually to discuss her blood clots and subsequent blood thinner needs.     --Diagnosed with COVID December 2023   --2/28/24 had endometrial cancer, stage 1A, no need for chemo post surgery.  --CTA on 4/27/24: Bilateral occlusive filling defects noted in the left lower lobe pulmonary vasculature involving segmental and subsegmental branches, lingular branches, and right upper lobe segmental, and right lower lobe segmental and subsegmental branches     Multiple family members with blood clots. Has a niece who is positive for Factor V Leiden. Her work up for FVL negative.     Has been on eliquis since that time.     Travels extensively. Going to [place] and then [place] until  early January.     Has daily pressure in her head, feels nauseated. ENT did CT that was clear.     PAST MEDICAL HISTORY:   Past Medical History:   Diagnosis Date    Abnormal ECG 05/02/2016 5/2/2016: Anterior T wave inversion. Normal stress echo 2016 Dr Grossman    Allergic rhinitis     ENT DR Meade, flonase bid    Bilateral hand pain 04/05/2022    Bunion of right foot 04/25/2019    MTP R 1st, [place]    Chronic gastric ulcer with hemorrhage 04/05/2022    Due to NSAIDS for L shoudler , sacroiliitis    Chronic left shoulder pain 04/18/2018    Endometrial cancer     Fever blister 04/18/2018    Gallbladder sludge 04/2014    general surgeon Dr Maxx Paige at [facility]    Gastric polyps     EGD 2015 Dr Brian    Hormone replacement therapy 04/05/2022    Postmenopausal vaginal bleeding 04/18/2018    Endometrial biopsy & tx by GYN Marv Smith 04/18/2018    Topical metronidazole    Seasonal allergic rhinitis due to pollen 04/25/2019    flonase daily, allergy testing in past, ENT Vaibhav    Seronegative spondyloarthropathy 05/02/2016    10/2015: Diagnosed.    Subclinical iodine-deficiency hypothyroidism 04/05/2022       PAST SURGICAL HISTORY:   Past Surgical History:   Procedure Laterality Date    APPENDECTOMY      laparoscopic same time as breezy    CHOLECYSTECTOMY      CYSTOSCOPY  02/28/2024    Procedure: CYSTOSCOPY;  Surgeon: Jodie Velazquez MD;  Location: [facility] OR;  Service: OB/GYN;;    HYSTERECTOMY      LYMPH NODE BIOPSY Bilateral 02/28/2024    Procedure: BIOPSY, LYMPH NODE;  Surgeon: Jodie Velazquez MD;  Location: [facility] OR;  Service: OB/GYN;  Laterality: Bilateral;    MAPPING, LYMPH NODE, SENTINEL Bilateral 02/28/2024    Procedure: MAPPING, LYMPH NODE, SENTINEL;  Surgeon: Jodie Velazquez MD;  Location: [facility] OR;  Service: OB/GYN;  Laterality: Bilateral;    ROBOT-ASSISTED LAPAROSCOPIC ABDOMINAL HYSTERECTOMY USING DA DULCE MARIA XI N/A 02/28/2024    Procedure: XI ROBOTIC HYSTERECTOMY;  Surgeon: Jodie Velazquez MD;   Location: [facility] OR;  Service: OB/GYN;  Laterality: N/A;  2 hr case    ROBOT-ASSISTED LAPAROSCOPIC SALPINGO-OOPHORECTOMY USING DA DULCE MARIA XI Bilateral 2024    Procedure: XI ROBOTIC SALPINGO-OOPHORECTOMY;  Surgeon: Jodie Velazquez MD;  Location: [facility] OR;  Service: OB/GYN;  Laterality: Bilateral;    SALIVARY GLAND SURGERY      R parotid, 2012, Dr Meade ENT    SURGICAL REMOVAL OF BONE SPUR         PAST SOCIAL HISTORY:   reports that she has never smoked. She has never used smokeless tobacco. She reports that she does not currently use alcohol. She reports that she does not use drugs.    FAMILY HISTORY:  Family History   Problem Relation Name Age of Onset    Cancer Mother  80        CLL    Alzheimer's disease Mother  83    Atrial fibrillation Mother      Hypertension Mother      Cancer Father          pancreatic,  81    Diabetes Father      Heart failure Father      Fibromyalgia Daughter Floridalma     Ovarian cancer Neg Hx      Uterine cancer Neg Hx      Breast cancer Neg Hx      Colon cancer Neg Hx         CURRENT MEDICATIONS:   Current Outpatient Medications   Medication Sig    b complex vitamins tablet Take 1 tablet by mouth once daily.    beta-carotene,A,-vits C,E/mins (OCUVITE ORAL) Take by mouth.    ELIQUIS 5 mg Tab Take 5 mg by mouth 2 (two) times daily.    Lactobacillus acidophilus (PROBIOTIC ORAL) Take 12.5 Billion Cells by mouth Daily. 1 capsule a day.    MULTIVITAMIN ORAL Take 1 tablet by mouth 2 (two) times daily.    NP THYROID 120 mg Tab Take 1 tablet by mouth every morning. Patient states she takes 90 mg.    UNABLE TO FIND once daily. lumity    UNABLE TO FIND once daily. dosokap    UNABLE TO FIND once daily. omaprem    UNABLE TO FIND once daily. adrecor    valACYclovir (VALTREX) 1000 MG tablet 1 po bid x 2 d at onset of fever blister    vit B6-L. [id number]-ALA (NUFOLA) 25 mg-3,500 mcg DFE-1 mg-300 mg Cap Take 1 tablet by mouth once daily.    VITAMIN D2-VITAMIN K1 ORAL Take 1 tablet by mouth  once daily.     No current facility-administered medications for this visit.     ALLERGIES:   Review of patient's allergies indicates:   Allergen Reactions    Clindamycin Rash    Doxycycline     Nsaids (non-steroidal anti-inflammatory drug) Other (See Comments)     Gastric ulcer-bleeding    Oxycodone Nausea And Vomiting    Percocet [oxycodone-acetaminophen] Nausea And Vomiting     Can take tylenol    Sulfa (sulfonamide antibiotics) Rash         Review of Systems:     Review of Systems   Constitutional:  Positive for appetite change. Negative for unexpected weight change.   HENT:   Negative for mouth sores.    Respiratory:  Negative for cough and shortness of breath.    Cardiovascular:  Negative for chest pain.   Gastrointestinal:  Positive for abdominal pain and diarrhea.   Musculoskeletal:  Positive for back pain.   Skin:  Positive for rash.   Neurological:  Positive for headaches.   Hematological:  Negative for adenopathy.   Psychiatric/Behavioral:  The patient is not nervous/anxious.         Physical Exam:   Limited secondary to virtual visit      ECOG Performance Status: (foot note - ECOG PS provided by Eastern Cooperative Oncology Group) 0 - Asymptomatic    Karnofsky Performance Score:  90%- Able to Carry on Normal Activity: Minor Symptoms of Disease    Labs:   Lab Results   Component Value Date    WBC 7.87 09/20/2024    HGB 13.1 09/20/2024    HCT 42.5 09/20/2024     09/20/2024    CHOL 210 (H) 03/29/2022    TRIG 105 03/29/2022    HDL 51 03/29/2022    ALT 18 06/11/2024    AST 16 06/11/2024     06/11/2024    K 3.9 06/11/2024     06/11/2024    CREATININE 0.8 06/11/2024    BUN 11 06/11/2024    CO2 25 06/11/2024    TSH <0.010 (L) 05/24/2024         Imaging: Previous imaging has been reviewed     Assessment and Plan:     Mr. Grace is a pleasant 68 year old female with multiple PE's on after DOAC therapy.    Multiple subsegmental pulmonary emboli   --Doing well with DOAC therapy  --Will  complete 6 months of therapy in the fall   --Given her extended travel planned for [place] will continue DOAC until she returns to [place] in early January  --Plan for repeat D-dimer in February and see me        I have provided the patient with an opportunity to ask questions and have all questions answered to his satisfaction.       she will return to clinic in 5 months, but knows to call in the interim if symptoms change or should a problem arise.        Ashly Greer MD  Hematology and Medical Oncology  Bone Marrow Transplant  [facility]        BMT Chart Routing      Follow up with physician . 1. labs at [facility] friday:cbc,mpn panel 2. ddimer on feb 15th and see me after   Follow up with SHERRON    Provider visit type    Infusion scheduling note    Injection scheduling note    Labs    Imaging    Pharmacy appointment    Other referrals

## 2024-09-19 ENCOUNTER — PATIENT MESSAGE (OUTPATIENT)
Dept: HEMATOLOGY/ONCOLOGY | Facility: CLINIC | Age: 69
End: 2024-09-19
Payer: MEDICARE

## 2024-09-20 ENCOUNTER — LAB VISIT (OUTPATIENT)
Dept: LAB | Facility: HOSPITAL | Age: 69
End: 2024-09-20
Attending: INTERNAL MEDICINE
Payer: MEDICARE

## 2024-09-20 DIAGNOSIS — I26.94 MULTIPLE SUBSEGMENTAL PULMONARY EMBOLI WITHOUT ACUTE COR PULMONALE: ICD-10-CM

## 2024-09-20 LAB
BASOPHILS # BLD AUTO: 0.06 K/UL (ref 0–0.2)
BASOPHILS NFR BLD: 0.8 % (ref 0–1.9)
DIFFERENTIAL METHOD BLD: ABNORMAL
EOSINOPHIL # BLD AUTO: 0.1 K/UL (ref 0–0.5)
EOSINOPHIL NFR BLD: 1.7 % (ref 0–8)
ERYTHROCYTE [DISTWIDTH] IN BLOOD BY AUTOMATED COUNT: 12.9 % (ref 11.5–14.5)
HCT VFR BLD AUTO: 42.5 % (ref 37–48.5)
HGB BLD-MCNC: 13.1 G/DL (ref 12–16)
IMM GRANULOCYTES # BLD AUTO: 0.03 K/UL (ref 0–0.04)
IMM GRANULOCYTES NFR BLD AUTO: 0.4 % (ref 0–0.5)
LYMPHOCYTES # BLD AUTO: 2 K/UL (ref 1–4.8)
LYMPHOCYTES NFR BLD: 25.3 % (ref 18–48)
MCH RBC QN AUTO: 27 PG (ref 27–31)
MCHC RBC AUTO-ENTMCNC: 30.8 G/DL (ref 32–36)
MCV RBC AUTO: 88 FL (ref 82–98)
MONOCYTES # BLD AUTO: 0.5 K/UL (ref 0.3–1)
MONOCYTES NFR BLD: 6.7 % (ref 4–15)
MPNR  SPECIMEN TYPE: NORMAL
NEUTROPHILS # BLD AUTO: 5.1 K/UL (ref 1.8–7.7)
NEUTROPHILS NFR BLD: 65.1 % (ref 38–73)
NRBC BLD-RTO: 0 /100 WBC
PLATELET # BLD AUTO: 325 K/UL (ref 150–450)
PMV BLD AUTO: 9.8 FL (ref 9.2–12.9)
RBC # BLD AUTO: 4.85 M/UL (ref 4–5.4)
WBC # BLD AUTO: 7.87 K/UL (ref 3.9–12.7)

## 2024-09-20 PROCEDURE — 85025 COMPLETE CBC W/AUTO DIFF WBC: CPT | Performed by: INTERNAL MEDICINE

## 2024-09-23 PROBLEM — I26.90 ACUTE SEPTIC PULMONARY EMBOLISM WITHOUT ACUTE COR PULMONALE: Status: ACTIVE | Noted: 2024-09-23

## 2024-10-08 ENCOUNTER — CLINICAL SUPPORT (OUTPATIENT)
Dept: REHABILITATION | Facility: HOSPITAL | Age: 69
End: 2024-10-08
Payer: MEDICARE

## 2024-10-08 DIAGNOSIS — M54.50 CHRONIC LOW BACK PAIN, UNSPECIFIED BACK PAIN LATERALITY, UNSPECIFIED WHETHER SCIATICA PRESENT: ICD-10-CM

## 2024-10-08 DIAGNOSIS — R51.9 CHRONIC NONINTRACTABLE HEADACHE, UNSPECIFIED HEADACHE TYPE: ICD-10-CM

## 2024-10-08 DIAGNOSIS — M79.642 BILATERAL HAND PAIN: ICD-10-CM

## 2024-10-08 DIAGNOSIS — C54.1 ENDOMETRIAL CANCER: ICD-10-CM

## 2024-10-08 DIAGNOSIS — M54.59 OTHER LOW BACK PAIN: Primary | ICD-10-CM

## 2024-10-08 DIAGNOSIS — G89.29 CHRONIC NONINTRACTABLE HEADACHE, UNSPECIFIED HEADACHE TYPE: ICD-10-CM

## 2024-10-08 DIAGNOSIS — G89.29 CHRONIC LOW BACK PAIN, UNSPECIFIED BACK PAIN LATERALITY, UNSPECIFIED WHETHER SCIATICA PRESENT: ICD-10-CM

## 2024-10-08 DIAGNOSIS — M79.641 BILATERAL HAND PAIN: ICD-10-CM

## 2024-10-08 PROCEDURE — 97811 ACUP 1/> W/O ESTIM EA ADD 15: CPT | Mod: PN | Performed by: ACUPUNCTURIST

## 2024-10-08 PROCEDURE — 97810 ACUP 1/> WO ESTIM 1ST 15 MIN: CPT | Mod: PN | Performed by: ACUPUNCTURIST

## 2024-10-10 NOTE — PROGRESS NOTES
Acupuncture Evaluation Note     [facility]  [facility] Number: [id number]    Traditional Chinese Medicine (TCM) Diagnosis: Qi Stagnation, Blood Stasis, and Blood Deficiency  Medical Diagnosis:   Encounter Diagnoses   Name Primary?    Other low back pain Yes    Chronic low back pain, unspecified back pain laterality, unspecified whether sciatica present     Bilateral hand pain     Chronic nonintractable headache, unspecified headache type     Endometrial cancer             Evaluation Date: 10/10/2024    Visit #/Visits authorized: 12, 8/12     Precautions: blood thinners and cancer    Subjective     Chief Concern: cLBP onset 1996, osteopenia and DDD in lumbar spine. Back pain has worsened lately in lower right side, radiating bilaterally into hips. Bilateral joint pain in wrists (LU9) onset 3 years ago with use, worsened in January 2024. Pain begins at base of thumb joint and radiates into digit joints and wrists.     Medical necessity is demonstrated by the following IMPAIRMENTS: Medical Necessity: Decreased mobility limits day to day activities, social, and emergent situations and Decreased quality of life              Aggravating Factors:  movement, flexion, extension, and rotation internal and external   Relieving Factors:  ice    Symptom Description:     Quality:  Sharp and shooting, Tight   Severity:  9  Frequency:  continuously      Objective       New Findings: right shoulder pain near colin mazin     Treatment     Treatment Principles:  Move qi, nourish blood, stop pain     Acupuncture points used:  4 LUU, BA KAVIN , Du20, Gb34, Ki3, Ki6, Lu7, Sp6, Sp9, SSC, St36, and YIN RIVERA    Bilateral points: LU10, LU9  Unilateral points: colin mazin   Auricular Treatment:      Needles In: 27  Needles Out: 27  Needles W/O STIM placed: 3:20  Needles W/O STIM removed: 3:50      Other Traditional Chinese Medicine Modalities -  heat lamp     Assessment     After treatment, patient felt acupuncture is helping symptoms      Patient prognosis is Good.     Patient will continue to benefit from acupuncture treatment to address the deficits listed in the problem list box on initial evaluation, provide patient family education and to maximize pt's level of independence in the home and community environment.     Patient's spiritual, cultural and educational needs considered and pt agreeable to plan of care and goals.     Anticipated barriers to treatment: none    Plan     Recommend 1 /week for 4 sessions then re-assess.      Education:  Patient is aware of cumulative benefit of acupuncture

## 2024-10-17 ENCOUNTER — OFFICE VISIT (OUTPATIENT)
Dept: PULMONOLOGY | Facility: CLINIC | Age: 69
End: 2024-10-17
Payer: MEDICARE

## 2024-10-17 VITALS
HEART RATE: 68 BPM | WEIGHT: 135.56 LBS | DIASTOLIC BLOOD PRESSURE: 76 MMHG | OXYGEN SATURATION: 99 % | SYSTOLIC BLOOD PRESSURE: 128 MMHG | HEIGHT: 65 IN | BODY MASS INDEX: 22.59 KG/M2

## 2024-10-17 DIAGNOSIS — I26.99 ACUTE PULMONARY EMBOLISM WITHOUT ACUTE COR PULMONALE, UNSPECIFIED PULMONARY EMBOLISM TYPE: ICD-10-CM

## 2024-10-17 DIAGNOSIS — R91.8 PULMONARY NODULES/LESIONS, MULTIPLE: ICD-10-CM

## 2024-10-17 DIAGNOSIS — J30.1 SEASONAL ALLERGIC RHINITIS DUE TO POLLEN: Primary | ICD-10-CM

## 2024-10-17 PROCEDURE — 99213 OFFICE O/P EST LOW 20 MIN: CPT | Mod: PBBFAC | Performed by: INTERNAL MEDICINE

## 2024-10-17 PROCEDURE — 99999 PR PBB SHADOW E&M-EST. PATIENT-LVL III: CPT | Mod: PBBFAC,,, | Performed by: INTERNAL MEDICINE

## 2024-10-17 RX ORDER — BUDESONIDE 0.5 MG/2ML
INHALANT ORAL
COMMUNITY
Start: 2024-08-22

## 2024-10-17 RX ORDER — PREDNISOLONE ACETATE 10 MG/ML
SUSPENSION/ DROPS OPHTHALMIC
COMMUNITY
Start: 2024-09-18

## 2024-10-17 NOTE — ASSESSMENT & PLAN NOTE
Diagnosed during a trip to [place] in April of 2024.  CTA revealed bilateral clot burden of significant quantity.  At the time she had recently undergone surgery in February of 2024 for hysterectomy for endometrial cancer.  She was also taking hormone replacement therapy at this time.  Also just underwent a flight to [place] and recovering from covid.  It seems this is likely to be a provoked PE.  - follow up with Hematology for further guidance on discontinuation of anticoagulation.  - continue Eliquis per Hematology recs.  It seems as though they are going to repeat a D-dimer early next year to see if she is a candidate for discontinuation.  If D-dimer is positive I would recommend repeat CT angiogram.  -avoid hormone replacement therapy   -avoid prolonged immobility.

## 2024-10-17 NOTE — PROGRESS NOTES
Subjective:     Patient ID: Caitlyn Grace is a 68 y.o. female.    Chief Complaint: Follow-up and Cough    Caitlyn Grace    Has an active medical problem list that includes:   Seronegative spondyloarthropathy, seasonal allergies due to pollen, rosacea, hyperlipidemia, postmenopausal vaginal bleeding, endometrial cancer (s/p hysterectomy 2/28/24), subclinical iodine deficiency hypothyroidism, on hormone replacement therapy, chronic gastric ulcer with hemorrhage, who presents as a self-referral following the diagnosis of PE and 3 mm pulmonary nodule during a hospitalization in [place].  She went to the hospital at that time for shortness of breath.  The night before her presentation she had palpitations.    She underwent surgery in 2/2024, had a flight to [place], hormone replacement therapy and recent endometrial cancer that may explain a provoked PE.   Also covid in 12/2023.     She does have many family members that have had PE and DVTs.  Some family members with factor V Leiden proven (niece).      Tobacco/vape: never  Occupation: office work  Exertional capacity: no limits  Prior lung disease: none  Sputum production: occasional  Allergies/sinusitis: yes, following with ENT and taking multiple treatments.   GERD/Aspiration: none  Pets: none  Travel/TB: never  Respiratory regimen: none  Prior cancer: endometrial, s/p resection.  Prior hospitalization/intubation: hospitalized in [place] for shortness of breath for PE in 4/2024.   Snoring/apnea: none     Today she is back to baseline.  She is following with heme, who is guiding her anticoagulation.      Review of Systems   Constitutional:  Negative for chills, weight gain, activity change, appetite change, fatigue and weakness.   HENT:  Negative for postnasal drip, rhinorrhea, sinus pressure and congestion.    Respiratory:  Negative for cough, hemoptysis, chest tightness, shortness of breath, wheezing, previous hospitialization due to pulmonary problems,  "dyspnea on extertion and use of rescue inhaler.    Cardiovascular:  Negative for chest pain, palpitations and leg swelling.   Gastrointestinal:  Negative for nausea, vomiting and abdominal pain.   Hematological:  Bleeds easily and excessive bruising.   Psychiatric/Behavioral:  Negative for confusion and sleep disturbance. The patient is not nervous/anxious.      Objective:     Vitals:    10/17/24 1121   BP: 128/76   BP Location: Left arm   Patient Position: Sitting   Pulse: 68   SpO2: 99%   Weight: 61.5 kg (135 lb 9.3 oz)   Height: 5' 5" (1.651 m)         Physical Exam   Constitutional: She is oriented to person, place, and time. She appears well-developed and well-nourished. No distress. She is not obese.   HENT:   Head: Normocephalic.   Neck: No JVD present.   Cardiovascular: Normal rate, regular rhythm and normal heart sounds. Exam reveals no gallop and no friction rub.   No murmur heard.  Pulmonary/Chest: Normal expansion, symmetric chest wall expansion, effort normal and breath sounds normal.   Abdominal: Soft. Bowel sounds are normal. She exhibits no distension. There is no abdominal tenderness.   Musculoskeletal:         General: No edema. Normal range of motion.      Cervical back: Normal range of motion and neck supple.   Neurological: She is alert and oriented to person, place, and time.   Skin: Skin is warm and dry. She is not diaphoretic.   Psychiatric: She has a normal mood and affect. Her behavior is normal. Judgment and thought content normal.       Personal Diagnostic Review    CTA Chest 4/27/2024  Bilateral, large burden of nearly occlusive segmental pulmonary emboli.  Evidence of right heart strain on CT scan.    CT C/A/P 2/19/2024  Lungs are symmetrically expanded. Bilateral pulmonary micro nodules largest measuring 3 mm (6-239), additional micro nodules for reference (6-231, 302, 306, 346). Bilateral calcified granulomas largest measuring 3 mm (6-92) additional calcified granulomas for reference " "(7-439, 841, 373). No focal consolidation, mass or significant pleural thickening or pleural fluid. No pneumothorax.         10/17/2024    11:21 AM 2024     9:16 AM 2024     8:35 AM 6/3/2024     1:05 PM 2024     9:33 AM 2024     2:30 PM 2024     2:59 PM   Pulmonary Function Tests   SpO2 99 %  98 % 96 %  100 %    Height 5' 5" (1.651 m) 5' 5" (1.651 m) 5' 5" (1.651 m) 5' 5" (1.651 m) 5' 5" (1.651 m) 5' 5" (1.651 m) 5' 4" (1.626 m)   Weight 61.5 kg (135 lb 9.3 oz) 62.4 kg (137 lb 9.1 oz) 62.4 kg (137 lb 9.1 oz) 62.1 kg (136 lb 14.5 oz) 62.6 kg (138 lb) 62.3 kg (137 lb 5.6 oz) 64 kg (141 lb)   BMI (Calculated) 22.6 22.9 22.9 22.8 23 22.9 24.2        Assessment:     1. Seasonal allergic rhinitis due to pollen    2. Pulmonary nodules/lesions, multiple    3. Acute pulmonary embolism without acute cor pulmonale, unspecified pulmonary embolism type           Outpatient Encounter Medications as of 10/17/2024   Medication Sig Dispense Refill    b complex vitamins tablet Take 1 tablet by mouth once daily.      beta-carotene,A,-vits C,E/mins (OCUVITE ORAL) Take by mouth.      budesonide (PULMICORT) 0.5 mg/2 mL nebulizer solution SMARTSI Packet(s) Both Nares Twice Daily      ELIQUIS 5 mg Tab Take 1 tablet (5 mg total) by mouth 2 (two) times daily. 180 tablet 1    Lactobacillus acidophilus (PROBIOTIC ORAL) Take 12.5 Billion Cells by mouth Daily. 1 capsule a day.      MULTIVITAMIN ORAL Take 1 tablet by mouth 2 (two) times daily.      NP THYROID 120 mg Tab Take 1 tablet by mouth every morning. Patient states she takes 90 mg.      prednisoLONE acetate (PRED FORTE) 1 % DrpS INSTILL 1 DROP INTO THE RIGHT EYE 3 TIMES A DAY      UNABLE TO FIND once daily. lumity      UNABLE TO FIND once daily. dosokap      UNABLE TO FIND once daily. omaprem      UNABLE TO FIND once daily. adrecor      valACYclovir (VALTREX) 1000 MG tablet 1 po bid x 2 d at onset of fever blister 12 tablet 3    vit B6-L. ynrfeicw-up-A65-ALA " (NUFOLA) 25 mg-3,500 mcg DFE-1 mg-300 mg Cap Take 1 tablet by mouth once daily.      VITAMIN D2-VITAMIN K1 ORAL Take 1 tablet by mouth once daily.       No facility-administered encounter medications on file as of 10/17/2024.     No orders of the defined types were placed in this encounter.      Plan:     Problem List Items Addressed This Visit          ENT    Seasonal allergic rhinitis due to pollen - Primary    Overview     flonase daily, allergy testing in past, ENT Vaibhav.  Utilizing Neti pot and multiple other modalities as prescribed by ENT. Doing well with these interventions.            Pulmonary    Pulmonary nodules/lesions, multiple    Current Assessment & Plan     Bilateral scattered subcentimeter pulmonary nodules noted on CT scan obtained in 02/20/2024.  Largest nodule is 3 mm in diameter.  She is low risk for lung cancer primary given no smoking history.  She does have a history of endometrial cancer that was local and treated with curative intent by hysterectomy.  I feel that these nodules are very likely benign.  Per Fleischner criteria now further follow up indicated in low risk patient.            Hematology    Acute pulmonary embolism without acute cor pulmonale    Current Assessment & Plan     Diagnosed during a trip to [place] in April of 2024.  CTA revealed bilateral clot burden of significant quantity.  At the time she had recently undergone surgery in February of 2024 for hysterectomy for endometrial cancer.  She was also taking hormone replacement therapy at this time.  Also just underwent a flight to [place] and recovering from covid.  It seems this is likely to be a provoked PE.  - follow up with Hematology for further guidance on discontinuation of anticoagulation.  - continue Eliquis per Hematology recs.  It seems as though they are going to repeat a D-dimer early next year to see if she is a candidate for discontinuation.  If D-dimer is positive I would recommend repeat CT  angiogram.  -avoid hormone replacement therapy   -avoid prolonged immobility.            RTC prn    Remy Butler MD  [facility]

## 2024-10-17 NOTE — ASSESSMENT & PLAN NOTE
Bilateral scattered subcentimeter pulmonary nodules noted on CT scan obtained in 02/20/2024.  Largest nodule is 3 mm in diameter.  She is low risk for lung cancer primary given no smoking history.  She does have a history of endometrial cancer that was local and treated with curative intent by hysterectomy.  I feel that these nodules are very likely benign.  Per Fleischner criteria now further follow up indicated in low risk patient.

## 2024-10-21 ENCOUNTER — OFFICE VISIT (OUTPATIENT)
Dept: GYNECOLOGIC ONCOLOGY | Facility: CLINIC | Age: 69
End: 2024-10-21
Payer: MEDICARE

## 2024-10-21 VITALS
TEMPERATURE: 99 F | HEIGHT: 65 IN | SYSTOLIC BLOOD PRESSURE: 136 MMHG | OXYGEN SATURATION: 99 % | WEIGHT: 136 LBS | DIASTOLIC BLOOD PRESSURE: 84 MMHG | BODY MASS INDEX: 22.66 KG/M2 | HEART RATE: 78 BPM

## 2024-10-21 DIAGNOSIS — Z85.42 HISTORY OF ENDOMETRIAL CANCER: Primary | ICD-10-CM

## 2024-10-21 PROCEDURE — 99214 OFFICE O/P EST MOD 30 MIN: CPT | Mod: PBBFAC | Performed by: OBSTETRICS & GYNECOLOGY

## 2024-10-21 PROCEDURE — 99999 PR PBB SHADOW E&M-EST. PATIENT-LVL IV: CPT | Mod: PBBFAC,,, | Performed by: OBSTETRICS & GYNECOLOGY

## 2024-10-21 NOTE — PROGRESS NOTES
GYN ONC     SUBJECTIVE:     Chief Complaint:  SURVEILLANCE ENDOMETRIAL CANCER, Stage IA Grade 2   History of Present Illness:  Caitlyn Grace is a 68 y.o. female who is here for surveillance visit for endometrial cancer. Of note, she developed DVT/PE about 8 weeks post op when she flew to [place]. She is following with pulmonology and heme/onc. She denies vaginal bleeding or pelvic pain. Intermittently notes a rectal pain, but it resolves. She had some nausea believed to be due to vision issues and new diagnosis of glaucoma. She had some RUQ pain worked up with US by PCP which was negative. She denies unintentional weight loss ,early satiety, or changes in bowel or bladder habits.     Oncology History   History of endometrial cancer   2/28/2024 Surgery    RATLH BSO SLNB   Grade 2 Endometrioid Adenocarcinoma, Superficial myometrial invasion (<50%), No LVSI   MMR: intact  P53: wild type  [place] Lymph Nodes: NEGATIVE   [place] Review:  COMMENT   Thank you for the opportunity to participate in this patient's care. I agree with your diagnosis of FIGO grade 2 endometrioid adenocarcinoma of the endometrium. Your p53 immunostain demonstrates a wild type staining pattern in the tumor. The tumor   involves   adenomyosis but I believe there is evidence of superficial myoinvasion on slide 3D; the extent of myoinvasion does not appear to be sufficient to upstage the tumor. In the absence of cervical involvement or involvement of lymph nodes or other organs,   this would still be   compatible with stage IA regardless of whether there is superficial myoinvasion (less than 50% of the myometrial thickness) or not.        2/28/2024 Initial Diagnosis    Endometrial cancer        Review of Systems:  Review of Systems per HPI      OBJECTIVE:     Physical Exam:  Physical Exam  Vitals reviewed.   Constitutional:       Appearance: Normal appearance.   Cardiovascular:      Rate and Rhythm: Normal rate.   Pulmonary:      Effort:  Pulmonary effort is normal.   Abdominal:      General: Abdomen is flat.      Palpations: Abdomen is soft.   Genitourinary:     General: Normal vulva.      Vagina: Normal.      Uterus: Absent.       Rectum: Normal.      Comments: No mass, no lesions, no nodularity   Neurological:      General: No focal deficit present.      Mental Status: She is alert and oriented to person, place, and time.   Psychiatric:         Mood and Affect: Mood normal.         Behavior: Behavior normal.         Thought Content: Thought content normal.         Judgment: Judgment normal.       ASSESSMENT:       ICD-10-CM ICD-9-CM    1. History of endometrial cancer  Z85.42 V10.42            Plan:      No evidence of disease on today's exam.   We discussed option of CT AP given her intermittent nausea and RUQ pain, now s/p negative Abd US. She would prefer to monitor as feels symptoms are improving at this time but will let me know if this changes.   Discussed signs and symptoms of recurrence and when to seek care between exams.   RTC 6 months for next surveillance         Jodie Velazquez MD  Gynecologic Oncology

## 2024-11-05 ENCOUNTER — CLINICAL SUPPORT (OUTPATIENT)
Dept: REHABILITATION | Facility: HOSPITAL | Age: 69
End: 2024-11-05
Payer: MEDICARE

## 2024-11-05 DIAGNOSIS — R53.81 PHYSICAL DECONDITIONING: Primary | ICD-10-CM

## 2024-11-05 DIAGNOSIS — F43.29 STRESS AND ADJUSTMENT REACTION: ICD-10-CM

## 2024-11-05 PROCEDURE — 97112 NEUROMUSCULAR REEDUCATION: CPT

## 2024-11-05 PROCEDURE — 97535 SELF CARE MNGMENT TRAINING: CPT

## 2024-11-05 PROCEDURE — 97110 THERAPEUTIC EXERCISES: CPT

## 2024-11-05 NOTE — PROGRESS NOTES
"  [facility]  Occupational Therapy Treatment Note - Therapeutic Yoga Progam    Date: 11/5/2024  [facility]  [facility] Number: [id number]    Therapy Diagnosis:   Encounter Diagnoses   Name Primary?    Physical deconditioning Yes    Stress and adjustment reaction        Physician: Dubinsky, Joanna L., PA*    Physician Orders: Physician Orders: Eval and Treat   Medical Diagnosis from Referral:  Endometrial cancer [C54.1]       Evaluation Date: 7/22/2024  Authorization Period Expiration: 4/4/25  Plan of Care Expiration: 12/22/24  Progress Note Due: 10/22/24  Visit # / Visits authorized: 2/12      Precautions: Standard and cancer     Time In: 1:45 pm  Time Out: 2:45 pm  Total Billable Time: 60 minutes    SUBJECTIVE     Pt reports: She enjoyd practicing her HEP but has questions.    She was compliant with home exercise program given last session.   Response to previous treatment: "I felt very relaxed"  Functional change: no change    Pain: 2/10  Location: Current 4/10, Multiple locations including hands, hips and low back.       Patients goals: 1. Increase my strength and endurance and get back to my pre cancer activities.  2. Sleep through the night.  3.  Learn ways to manage my stress and quiet my mind.  4. Learn how to do yoga to get stronger and more relaxed.       OBJECTIVE     Objective Measures updated at progress report unless specified.  Patient Specific Functional Scale:           Activity 7/22/24           Endurance  4           2.   sleep 3           3.  deconditioned 5           4.  stress 3           5.             6.             SCORE   3.75              Total Score = Sum of activity scores / number of activities  Minimum Detectable Change (90% CII) for average score = 2 points  Minimum Detectable Change (90% CI) for single activity score = 3 points      Treatment     Caitlyn received the treatments listed below:       Date 9/3/24 11/5/24       Therapeutic Yoga Exercises " / Neuromuscular Re-education 30 minutes  45 minutes  minutes  minutes  minutes  minutes   Seated Yoga   chair yoga:  Cat-Cow,forward bend, back bend, twist,  chair yoga:  Cat-Cow,forward bend, back bend, twist,        Quadruped         Supine Modified boat with block- 3 x 5 breaths, twist x 2, knee to chest flow,  modified boat with block- 3 x 5 breaths, twist x 2, knee to chest flow,       Prone         standing Warrior 2, chair pose/volcano x 2, wide straddle forward fold, down dog with chair,  Warrior 2, chair pose/volcano x 2, wide straddle forward fold, down dog with chair,                          Self-Care/Home Management  / Therapeutic Activities  30 minutes  15 minutes  minutes  minutes  minutes  minutes            Relaxation techniques Diaphragmatic breathing,(DB), body scan, DB, body scan,       Restorative  Bridge and fish with block Bridge and fish with blocks       Activity Pacing                           Stress Management/Education  - physiology of yoga/meditation and immune health - physiology of yoga/meditation and immune health                    Patient Education and Home Exercises      Education provided:   - - physiology of yoga/meditation and immune health  - Progress towards goals     Written Home Exercises Provided: yes.  Exercises were reviewed and Caitlyn was able to demonstrate them prior to the end of the session.  Caitlyn demonstrated good  understanding of the HEP provided. See EMR under Patient Instructions for exercises provided during therapy sessions.       Assessment       In today's session, Caitlyn reviewed her yoga HEP consisting of strengthening, stretching, and balance yoga exercise.  She reviewed breathing and body scan techniques to assist with relaxation/immune health. She tolerated the session well and required maximum verbal and neuromuscular cues in all treatment.   Caitlyn is progressing well towards her goals and patient prognosis is Good. Patient will continue to benefit  from skilled outpatient occupational therapy to address the deficits listed in the problem list on initial evaluation provide pt/family education and to maximize pt's level of independence in the home and community environment. Pt's spiritual, cultural and educational needs considered and pt agreeable to plan of care and goals.    Anticipated barriers to occupational therapy: none    Goals:  Short Term Goals: 6 weeks      Goal # Goal Status   1 Patient will demonstrate independence with diaphragmatic breathing in sit and supine. Progressing   2 Pt. will identify resource for audio body scan to assist with stress management/immune health progressing   3 Patient will identify 2 new stress coping skills for stress management/immune health. Progressing   4 Patient will identify activity pacing problems.  Patient will then implement new plan for daily activity to increase endurance for ADL's. Progressing      Long Term Goals: 12 weeks      Goal # Goal Status   1 Patient will demonstrate independence with yoga Home Exercise Program to increase strength and endurance for ADL's. Progressing   2 Patient will demonstrate independence with relaxation techniques to manage stress for immune health. Progressing   3 Patient will verbalize good understanding of stress/immune health relationship.  Progressing   4            PLAN     Plan of care Certification: 11/5/2024 to 12/22/24.    Outpatient Occupational Therapy 1 times weekly for 12 weeks to include the following interventions: Neuromuscular Re-ed, Patient Education, Self Care, Therapeutic Activities, and Therapeutic Exercise.     Katerin Loyola OT

## 2024-11-06 ENCOUNTER — PATIENT MESSAGE (OUTPATIENT)
Dept: REHABILITATION | Facility: HOSPITAL | Age: 69
End: 2024-11-06
Payer: MEDICARE

## 2024-11-12 ENCOUNTER — CLINICAL SUPPORT (OUTPATIENT)
Dept: REHABILITATION | Facility: HOSPITAL | Age: 69
End: 2024-11-12
Payer: MEDICARE

## 2024-11-12 DIAGNOSIS — F43.29 STRESS AND ADJUSTMENT REACTION: ICD-10-CM

## 2024-11-12 DIAGNOSIS — R53.81 PHYSICAL DECONDITIONING: Primary | ICD-10-CM

## 2024-11-12 PROCEDURE — 97112 NEUROMUSCULAR REEDUCATION: CPT

## 2024-11-12 PROCEDURE — 97535 SELF CARE MNGMENT TRAINING: CPT

## 2024-11-12 PROCEDURE — 97110 THERAPEUTIC EXERCISES: CPT

## 2024-11-12 NOTE — PROGRESS NOTES
"  [facility]  Occupational Therapy Progress and Treatment Note - Therapeutic Yoga Progam    Date: 11/12/2024  Name: Caitlyn Grace  Clinic Number: [id number]    Therapy Diagnosis:   Encounter Diagnoses   Name Primary?    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  She was given splints but they were uncomfortable and she cannot wear them.   She was compliant with home exercise program given last session.   Response to previous treatment: "I felt very relaxed"  Functional change: no change    Pain: 2/10  Location: Current 4/10, Multiple locations including hands, hips and low back.       Patients goals: 1. Increase my strength and endurance and get back to my pre cancer activities.  2. Sleep through the night.  3.  Learn ways to manage my stress and quiet my mind.  4. Learn how to do yoga to get stronger and more relaxed.       OBJECTIVE     Objective Measures updated at progress report unless specified.  Patient Specific Functional Scale:           Activity 7/22/24 11/12/24          Endurance  4       6         2.   sleep 3       5         3.  deconditioned 5       5         4.  stress 3       5         5.             6.             SCORE   3.75     5.25            Total Score = Sum of activity scores / number of activities  Minimum Detectable Change (90% CII) for average score = 2 points  Minimum Detectable Change (90% CI) for single activity " score = 3 points      Treatment     Caitlyn received the treatments listed below:       Date 9/3/24 11/5/24 11/12/24      Therapeutic Yoga Exercises / Neuromuscular Re-education 30 minutes  45 minutes  30 minutes  minutes  minutes  minutes   Seated Yoga   chair yoga:  Cat-Cow,forward bend, back bend, twist,  chair yoga:  Cat-Cow,forward bend, back bend, twist,  chair yoga:  Cat-Cow,forward bend, back bend, twist,       Quadruped         Supine Modified boat with block- 3 x 5 breaths, twist x 2, knee to chest flow,  modified boat with block- 3 x 5 breaths, twist x 2, knee to chest flow, modified boat with block- 3 x 5 breaths, twist x 2, knee to chest flow,      Prone         standing Warrior 2, chair pose/volcano x 2, wide straddle forward fold, down dog with chair,  Warrior 2, chair pose/volcano x 2, wide straddle forward fold, down dog with chair,  Warrior 2, chair pose/volcano x 2, wide straddle forward fold, down dog with chair,       UE   Given link to thumb splints               Self-Care/Home Management  / Therapeutic Activities  30 minutes  15 minutes  15 minutes  minutes  minutes  minutes            Relaxation techniques Diaphragmatic breathing,(DB), body scan, DB, body scan, DB, body scan,      Restorative  Bridge and fish with block Bridge and fish with blocks Bridge and fish with blocks      Activity Pacing                           Stress Management/Education  - physiology of yoga/meditation and immune health - physiology of yoga/meditation and immune health physiology of yoga/meditation and immune health                   Patient Education and Home Exercises      Education provided:   - - physiology of yoga/meditation and immune health  - Progress towards goals     Written Home Exercises Provided: yes.  Exercises were reviewed and Caitlyn was able to demonstrate them prior to the end of the session.  Caitlyn demonstrated good  understanding of the HEP provided. See EMR under Patient Instructions for  exercises provided during therapy sessions.       Assessment       In today's session, Caitlyn reviewed her yoga HEP consisting of strengthening, stretching, and balance yoga exercise.  She reviewed breathing and body scan techniques to assist with relaxation/immune health. She tolerated the session well and required maximum verbal and neuromuscular cues in all treatment. She was given link thumb splints.    Caitlyn is progressing well towards her goals and patient prognosis is Good. Patient will continue to benefit from skilled outpatient occupational therapy to address the deficits listed in the problem list on initial evaluation provide pt/family education and to maximize pt's level of independence in the home and community environment. Pt's spiritual, cultural and educational needs considered and pt agreeable to plan of care and goals.    Anticipated barriers to occupational therapy: none    Goals:  Short Term Goals: 6 weeks      Goal # Goal Status   1 Patient will demonstrate independence with diaphragmatic breathing in sit and supine. Progressing   2 Pt. will identify resource for audio body scan to assist with stress management/immune health progressing   3 Patient will identify 2 new stress coping skills for stress management/immune health. Progressing   4 Patient will identify activity pacing problems.  Patient will then implement new plan for daily activity to increase endurance for ADL's. Progressing      Long Term Goals: 12 weeks      Goal # Goal Status   1 Patient will demonstrate independence with yoga Home Exercise Program to increase strength and endurance for ADL's. Progressing   2 Patient will demonstrate independence with relaxation techniques to manage stress for immune health. Progressing   3 Patient will verbalize good understanding of stress/immune health relationship.  Progressing   4            PLAN     Plan of care Certification: 11/12/2024 to 12/22/24.    Outpatient Occupational Therapy 1  times weekly for 12 weeks to include the following interventions: Neuromuscular Re-ed, Patient Education, Self Care, Therapeutic Activities, and Therapeutic Exercise.     Katerin Loyola OT

## 2025-01-10 ENCOUNTER — TELEPHONE (OUTPATIENT)
Dept: HEMATOLOGY/ONCOLOGY | Facility: CLINIC | Age: 70
End: 2025-01-10
Payer: MEDICARE

## 2025-01-10 NOTE — TELEPHONE ENCOUNTER
----- Message from Frank sent at 1/10/2025  2:13 PM CST -----  Regarding: FW: Appt  Contact: Pt  [phone]    Hi, ladherminia can you please give pt a call back concerning her appt. and medication that should be discontinue. She's asking medical questions I can't answer. Let me know so that I can schedule her appt       Thanks,  ----- Message -----  From: Payal Paredes  Sent: 1/10/2025   1:49 PM CST  To: [facility]  [facility]  Subject: Appt                                                     Appt Type: Est Pt      Date/Time Preference:  Next aniceto     Treating Provider: Ashly Greer MD      Caller Name: Caitlyn     Contact Prefer: [phone]    Comments/Notes: Received letter to contact office to schedule follow up appt

## 2025-01-10 NOTE — TELEPHONE ENCOUNTER
----- Message from Frank sent at 1/10/2025  2:13 PM CST -----  Regarding: FW: Appt  Contact: Pt  [phone]    Hi, ladherminia can you please give pt a call back concerning her appt. and medication that should be discontinue. She's asking medical questions I can't answer. Let me know so that I can schedule her appt       Thanks,  ----- Message -----  From: Payal Paredes  Sent: 1/10/2025   1:49 PM CST  To: [facility]  Pool  Subject: Appt                                                     Appt Type: Est Pt      Date/Time Preference:  Next aniceto     Treating Provider: Ashly Greer MD      Caller Name: Caitlyn     Contact Prefer: [phone]    Comments/Notes: Received letter to contact office to schedule follow up appt

## 2025-01-10 NOTE — TELEPHONE ENCOUNTER
----- Message from Payal sent at 1/10/2025  1:46 PM CST -----  Regarding: Appt  Contact: Pt  [phone]          Appt Type: Est Pt      Date/Time Preference:  Next aniceto     Treating Provider: Ashly Greer MD      Caller Name: Caitlyn     Contact Prefer: [phone]    Comments/Notes: Received letter to contact office to schedule follow up appt

## 2025-01-10 NOTE — TELEPHONE ENCOUNTER
Returned pt's call about scheduling appt. Pt stated that at her last visit Dr. Greer and her discussed stopping her Eliquis to see if she needed to continue taking it. However, pt had a planned trip to [place] and was hesitant on stopping while she was out of state. Pt wanted to know if she could stop her Eliquis now and begin the 30 days off of it or did she need to see Dr. Greer before discontinuing. Per Dr. Greer it is okay for her to stop the medication now and they will follow up late February with D-Dimer labs collected prior to her visit. Pt also questioned if she could return to a normal diet. Instructed her that Dr. Greer said she could resume eating her normal diet. Pt verbalized understanding of instructions and was scheduled for her lab and follow-up visit after the 30 days.

## 2025-01-14 ENCOUNTER — CLINICAL SUPPORT (OUTPATIENT)
Dept: REHABILITATION | Facility: HOSPITAL | Age: 70
End: 2025-01-14
Payer: MEDICARE

## 2025-01-14 DIAGNOSIS — F43.29 STRESS AND ADJUSTMENT REACTION: ICD-10-CM

## 2025-01-14 DIAGNOSIS — R53.81 PHYSICAL DECONDITIONING: Primary | ICD-10-CM

## 2025-01-14 PROCEDURE — 97535 SELF CARE MNGMENT TRAINING: CPT

## 2025-01-14 PROCEDURE — 97112 NEUROMUSCULAR REEDUCATION: CPT

## 2025-01-14 PROCEDURE — 97110 THERAPEUTIC EXERCISES: CPT

## 2025-01-14 NOTE — PROGRESS NOTES
"  [facility]  Occupational Therapy Progress and Treatment Note - Therapeutic Yoga Progam    Date: 1/14/2025  Name: Caitlyn Grace  Clinic Number: [id number]    Therapy Diagnosis:   Encounter Diagnoses   Name Primary?    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  She was given splints but they were uncomfortable and she cannot wear them.   She was compliant with home exercise program given last session.   Response to previous treatment: "I felt very relaxed"  Functional change: no change    Pain: 2/10  Location: Current 4/10, Multiple locations including hands, hips and low back.       Patients goals: 1. Increase my strength and endurance and get back to my pre cancer activities.  2. Sleep through the night.  3.  Learn ways to manage my stress and quiet my mind.  4. Learn how to do yoga to get stronger and more relaxed.       OBJECTIVE     Objective Measures updated at progress report unless specified.  Patient Specific Functional Scale:           Activity 7/22/24 11/12/24 1/14/25       Endurance  4       6     7       2.   sleep 3       5      6       3.  deconditioned 5       5      6       4.  stress 3       5       7       5.             6.             SCORE   3.75     5.25            Total Score = Sum of activity scores / number of activities  Minimum Detectable Change (90% CII) for average score = 2 points  Minimum Detectable Change (90% " CI) for single activity score = 3 points      Treatment     Caitlyn received the treatments listed below:       Date 9/3/24 11/5/24 11/12/24 1/14/25     Therapeutic Yoga Exercises / Neuromuscular Re-education 30 minutes  45 minutes  30 minutes  30 minutes  PN, POC  minutes  minutes   Seated Yoga   chair yoga:  Cat-Cow,forward bend, back bend, twist,  chair yoga:  Cat-Cow,forward bend, back bend, twist,  chair yoga:  Cat-Cow,forward bend, back bend, twist,  chair yoga:  Cat-Cow,forward bend, back bend, twist,      Quadruped         Supine Modified boat with block- 3 x 5 breaths, twist x 2, knee to chest flow,  modified boat with block- 3 x 5 breaths, twist x 2, knee to chest flow, modified boat with block- 3 x 5 breaths, twist x 2, knee to chest flow, modified boat with block- 3 x 5 breaths, twist x 2, knee to chest flow,     Prone         standing Warrior 2, chair pose/volcano x 2, wide straddle forward fold, down dog with chair,  Warrior 2, chair pose/volcano x 2, wide straddle forward fold, down dog with chair,  Warrior 2, chair pose/volcano x 2, wide straddle forward fold, down dog with chair,  Warrior 2, chair pose/volcano x 2 down dog with chair,      UE   Given link to thumb splints Kinesio tape to bilateral thumbs               Self-Care/Home Management  / Therapeutic Activities  30 minutes  15 minutes  15 minutes  15 minutes  minutes  minutes            Relaxation techniques Diaphragmatic breathing,(DB), body scan, DB, body scan, DB, body scan, DB, body scan,     Restorative  Bridge and fish with block Bridge and fish with blocks Bridge and fish with blocks Supine with bolster under hips and chair under legs     Activity Pacing                           Stress Management/Education  - physiology of yoga/meditation and immune health - physiology of yoga/meditation and immune health physiology of yoga/meditation and immune health - physiology of yoga/meditation and immune health                  Patient Education  and Home Exercises      Education provided:   - - physiology of yoga/meditation and immune health  - Progress towards goals     Written Home Exercises Provided: yes.  Exercises were reviewed and Caitlyn was able to demonstrate them prior to the end of the session.  Caitlyn demonstrated good  understanding of the HEP provided. See EMR under Patient Instructions for exercises provided during therapy sessions.       Assessment       In today's session, Caitlyn reviewed her yoga HEP consisting of strengthening, stretching, and balance yoga exercise.  She reviewed breathing and body scan techniques to assist with relaxation/immune health. She tolerated the session well and required maximum verbal and neuromuscular cues in all treatment. She was given link thumb splints.    Caitlyn is progressing well towards her goals and patient prognosis is Good. Patient will continue to benefit from skilled outpatient occupational therapy to address the deficits listed in the problem list on initial evaluation provide pt/family education and to maximize pt's level of independence in the home and community environment. Pt's spiritual, cultural and educational needs considered and pt agreeable to plan of care and goals.    Anticipated barriers to occupational therapy: none    Goals:  Short Term Goals: 6 weeks      Goal # Goal Status   1 Patient will demonstrate independence with diaphragmatic breathing in sit and supine. Progressing   2 Pt. will identify resource for audio body scan to assist with stress management/immune health met   3 Patient will identify 2 new stress coping skills for stress management/immune health. Progressing   4 Patient will identify activity pacing problems.  Patient will then implement new plan for daily activity to increase endurance for ADL's. Progressing      Long Term Goals: 12 weeks      Goal # Goal Status   1 Patient will demonstrate independence with yoga Home Exercise Program to increase strength and endurance  for ADL's. Progressing   2 Patient will demonstrate independence with relaxation techniques to manage stress for immune health. Progressing   3 Patient will verbalize good understanding of stress/immune health relationship.  Progressing   4            PLAN     Plan of care Certification: 1/14/2025 to 4/14/25    Outpatient Occupational Therapy 1 times weekly for 10 weeks to include the following interventions: Neuromuscular Re-ed, Patient Education, Self Care, Therapeutic Activities, and Therapeutic Exercise.     Katerin Loyola OT

## 2025-01-14 NOTE — PLAN OF CARE
"  Outpatient Therapy Updated Plan of Care     Visit Date: 1/14/2025  Name: Caitlyn Grace  Clinic Number: [id number]    Therapy Diagnosis:   Encounter Diagnoses   Name Primary?    Physical deconditioning Yes    Stress and adjustment reaction      Physician: Dubinsky, Joanna L., PA*    Physician Orders:Physician Orders: Physician Orders: Eval and Treat   Medical Diagnosis from Referral:  Endometrial cancer [C54.1]       Evaluation Date: 7/22/2024  Authorization Period Expiration: 4/4/25  Plan of Care Expiration: 4/14/25  Progress Note Due: 2/14/25  Visit # / Visits authorized: 4/12      Precautions: Standard and cancer      Medical necessity is demonstrated by the following IMPAIRMENTS: Medical Necessity: Decreased quality of life       Subjective     Update: Pt reports:  Her  thumbs hurt due to bilateral thumb CMC dysfunction.  She was given splints but they were uncomfortable and she cannot wear them.   She was compliant with home exercise program given last session.   Response to previous treatment: "I felt very relaxed"  Functional change: no change     Pain: 2/10  Location: Current 4/10, Multiple locations including hands, hips and low back.        Patients goals: 1. Increase my strength and endurance and get back to my pre cancer activities.  2. Sleep through the night.  3.  Learn ways to manage my stress and quiet my mind.  4. Learn how to do yoga to get stronger and more relaxed.       Objective     Pt reports:  Her  thumbs hurt due to bilateral thumb CMC dysfunction.  She was given splints but they were uncomfortable and she cannot wear them.   She was compliant with home exercise program given last session.   Response to previous treatment: "I felt very relaxed"  Functional change: no change     Pain: 2/10  Location: Current 4/10, Multiple locations including hands, hips and low back.        Patients goals: 1. Increase my strength and endurance and get back to my pre cancer activities.  2. Sleep through " the night.  3.  Learn ways to manage my stress and quiet my mind.  4. Learn how to do yoga to get stronger and more relaxed.       Assessment     Update: In today's session, Caitlyn reviewed her yoga HEP consisting of strengthening, stretching, and balance yoga exercise.  She reviewed breathing and body scan techniques to assist with relaxation/immune health. She tolerated the session well and required maximum verbal and neuromuscular cues in all treatment. She was given link thumb splints.    Caitlyn is progressing well towards her goals and patient prognosis is Good. Patient will continue to benefit from skilled outpatient occupational therapy to address the deficits listed in the problem list on initial evaluation provide pt/family education and to maximize pt's level of independence in the home and community environment. Pt's spiritual, cultural and educational needs considered and pt agreeable to plan of care and goals.     Anticipated barriers to occupational therapy: none       GOALS:  Short Term Goals: 6 weeks      Goal # Goal Status   1 Patient will demonstrate independence with diaphragmatic breathing in sit and supine. Progressing   2 Pt. will identify resource for audio body scan to assist with stress management/immune health met   3 Patient will identify 2 new stress coping skills for stress management/immune health. Progressing   4 Patient will identify activity pacing problems.  Patient will then implement new plan for daily activity to increase endurance for ADL's. Progressing      Long Term Goals: 12 weeks      Goal # Goal Status   1 Patient will demonstrate independence with yoga Home Exercise Program to increase strength and endurance for ADL's. Progressing   2 Patient will demonstrate independence with relaxation techniques to manage stress for immune health. Progressing   3 Patient will verbalize good understanding of stress/immune health relationship.  Progressing   4            Plan     Updated  Certification Period: 1/14/2025 to 4/14/25  Recommended Treatment Plan: 1 times per week for 8 weeks: Neuromuscular Re-ed, Patient Education, Self Care, Therapeutic Activities, and Therapeutic Exercise    Katerin Loyola OT  1/14/2025      I CERTIFY THE NEED FOR THESE SERVICES FURNISHED UNDER THIS PLAN OF TREATMENT AND WHILE UNDER MY CARE    Physician's comments:        Physician's Signature: ___________________________________________________

## 2025-01-20 ENCOUNTER — PATIENT MESSAGE (OUTPATIENT)
Dept: REHABILITATION | Facility: HOSPITAL | Age: 70
End: 2025-01-20
Payer: MEDICARE

## 2025-01-24 ENCOUNTER — CLINICAL SUPPORT (OUTPATIENT)
Dept: REHABILITATION | Facility: HOSPITAL | Age: 70
End: 2025-01-24
Payer: MEDICARE

## 2025-01-24 ENCOUNTER — LAB VISIT (OUTPATIENT)
Dept: LAB | Facility: HOSPITAL | Age: 70
End: 2025-01-24
Attending: INTERNAL MEDICINE
Payer: MEDICARE

## 2025-01-24 DIAGNOSIS — F43.29 STRESS AND ADJUSTMENT REACTION: ICD-10-CM

## 2025-01-24 DIAGNOSIS — I26.94 MULTIPLE SUBSEGMENTAL PULMONARY EMBOLI WITHOUT ACUTE COR PULMONALE: ICD-10-CM

## 2025-01-24 DIAGNOSIS — R53.81 PHYSICAL DECONDITIONING: Primary | ICD-10-CM

## 2025-01-24 LAB — D DIMER PPP IA.FEU-MCNC: 0.39 MG/L FEU

## 2025-01-24 PROCEDURE — 97110 THERAPEUTIC EXERCISES: CPT

## 2025-01-24 PROCEDURE — 36415 COLL VENOUS BLD VENIPUNCTURE: CPT | Performed by: INTERNAL MEDICINE

## 2025-01-24 PROCEDURE — 97535 SELF CARE MNGMENT TRAINING: CPT

## 2025-01-24 PROCEDURE — 97112 NEUROMUSCULAR REEDUCATION: CPT

## 2025-01-24 PROCEDURE — 85379 FIBRIN DEGRADATION QUANT: CPT | Performed by: INTERNAL MEDICINE

## 2025-01-24 NOTE — PROGRESS NOTES
Outpatient Rehab    Occupational Therapy Visit    Patient Name: Caitlyn Grace  MRN: [id number]  YOB: 1955  Today's Date: 1/24/2025    Therapy Diagnosis:   Encounter Diagnoses   Name Primary?    Physical deconditioning Yes    Stress and adjustment reaction      Physician: Dubinsky, Joanna L., PA*    Physician Orders: Eval and Treat  Medical Diagnosis: Endometrial cancer [C54.1]     Visit # / Visits Authorized: 6 / 12   Date of Evaluation:  7/22/24  Insurance Authorization Period: 1/1/25 to 12/31/25  Plan of Care Certification:  1/14/25 to 4/14/25      Time In: 1:00pm     Time Out: 2:00pm    Total Time: 60    Total Billable Time: 60    Precautions     Cancer and standard      Subjective   I have been having feelings of nausea and my DrBurton is saying it is from tension in my shoulders, neck and general stress..  Pain reported as 3. bilateral shoulders and neck due to stress    Objective           Intake Outcome Measure for FOTO Survey    Therapist reviewed FOTO scores for Caitlyn Grace on 1/24/2025.   FOTO report - see Media section or FOTO account episode details.     Intake Score:  %  Survey Score 1:  %  Survey Score 2:  %    Treatment:  Therapeutic Exercise  Therapeutic Exercise Activity 1: YOGA EXERCISE:  SEATED IN CHAIR-cat-cow, forward fold, cobra backbend, twist, figure 4  Therapeutic Exercise Activity 2: bilateral upper trap and levator scapulae release HEP, geovanna pose with forehead on block to self massage forehaed and temples    Self Cares and ADLs  Self Care/ADL Activity 1: RELAXATION;  body scan in supine and sitting; diaphragmatic breath,(DB); viloma with 1 pause at end of exhale    Balance/Neuromuscular Re-Education  Balance/Neuromuscular Re-Education Activity 1: verbal and tactile cues for neuromuscular awareness in exercise and relaxation techniques    Patient's spiritual, cultural, and educational needs considered and patient agreeable to plan of care and goals.     Assessment  & Plan   Assessment:    Evaluation/Treatment Tolerance: Patient tolerated treatment well  Education  Education was done with Patient. The patient's learning style includes Demonstration. The patient Verbalizes understanding.         Pt. Will be able to demonstrate and verbalize the relationship between the stress and relaxation response and immune health.       Plan:      Goals:   Active       Long Term Goals       Patient will demonstrate independence with yoga Home Exercise Program to increase strength and endurance for ADL's.       Start:  01/24/25    Expected End:  04/15/25            Patient will demonstrate independence with relaxation techniques to manage stress for immune health. Progressing       Start:  01/24/25    Expected End:  04/15/25            Patient will verbalize good understanding of stress/immune health relationship.        Start:  01/24/25    Expected End:  04/15/25               Short Term Goals              1 Patient will demonstrate independence with yoga Home Exercise Program to increase strength and endurance for ADL's. Progressing  2 Patient will demonstrate independence with relaxation techniques to manage stress for immune health. Progressing  3 Patient will verbalize good understanding of stress/immune health relationship.            Pt. will be independent for diaphragmatic breathing       Start:  01/24/25    Expected End:  03/15/25            Pt. will require minimal assistance for yoga HEP        Start:  01/24/25    Expected End:  03/15/25            Pt. will require minimal assistance for yoga relaxation techniques to assist with immune health       Start:  01/24/25    Expected End:  03/15/25

## 2025-01-28 ENCOUNTER — CLINICAL SUPPORT (OUTPATIENT)
Dept: REHABILITATION | Facility: HOSPITAL | Age: 70
End: 2025-01-28
Payer: MEDICARE

## 2025-01-28 DIAGNOSIS — R53.81 PHYSICAL DECONDITIONING: Primary | ICD-10-CM

## 2025-01-28 DIAGNOSIS — F43.29 STRESS AND ADJUSTMENT REACTION: ICD-10-CM

## 2025-01-28 PROCEDURE — 97535 SELF CARE MNGMENT TRAINING: CPT

## 2025-01-28 PROCEDURE — 97112 NEUROMUSCULAR REEDUCATION: CPT

## 2025-01-28 PROCEDURE — 97110 THERAPEUTIC EXERCISES: CPT

## 2025-01-28 NOTE — PROGRESS NOTES
Outpatient Rehab    Occupational Therapy Visit    Patient Name: Caitlyn Grace  MRN: [id number]  YOB: 1955  Today's Date: 1/28/2025    Therapy Diagnosis:   Encounter Diagnoses   Name Primary?    Physical deconditioning Yes    Stress and adjustment reaction      Physician: Dubinsky, Joanna L., PA*    Physician Orders: Eval and Treat  Medical Diagnosis: Endometrial cancer [C54.1]     Visit # / Visits Authorized: 6 / 12   Date of Evaluation:  7/22/24  Insurance Authorization Period: 1/1/25 to 12/31/25  Plan of Care Certification:  1/14/25 to 4/14/25      Time In: 1:00pm 1345   Time Out: 2:00pm 1430  Total Time: 6045   Total Billable Time: 60         Subjective   I started PT for my neck..  Pain reported as 3. bilateral shoulders and neck due to stress    Objective :  Patient Specific Functional Scale:           Activity 7/22/24 11/12/24 1/14/25       Endurance  4       6     7       2.   sleep 3       5      6       3.  deconditioned 5       5      6       4.  stress 3       5       7       5.             6.             SCORE   3.75     5.25            Total Score = Sum of activity scores / number of activities  Minimum Detectable Change (90% CII) for average score = 2 points  Minimum Detectable Change (90% CI) for single activity score = 3 points            Treatment:  Therapeutic Exercise  Therapeutic Exercise Activity 1: (P) YOGA EXERCISE:  SEATED IN CHAIR-cat-cow, forward fold, cobra backbend, twist, figure 4  Therapeutic Exercise Activity 2: (P) YOGA EXERCISE; STANDING- WARRIOR 2, CHAIR, V[place]O, WIDE STRADDLE FORWARD FOLD,  Therapeutic Exercise Activity 3: (P) YOGA EXERCISE: SUPINE- knee to chest flow and bound angle with bolster under hips, twist x 2, modified boat squeezing block with knnes on exhale 3 sets of 5 breaths,              Patient's spiritual, cultural, and educational needs considered and patient agreeable to plan of care and goals.     Assessment & Plan   Assessment:        Education             Pt. Will be able to demonstrate and verbalize the relationship between the stress and relaxation response and immune health.       Plan: Continue with plan    Goals:   Active       Long Term Goals       Patient will demonstrate independence with yoga Home Exercise Program to increase strength and endurance for ADL's. (Progressing)       Start:  01/24/25    Expected End:  04/15/25            Patient will demonstrate independence with relaxation techniques to manage stress for immune health. Progressing (Progressing)       Start:  01/24/25    Expected End:  04/15/25            Patient will verbalize good understanding of stress/immune health relationship.  (Progressing)       Start:  01/24/25    Expected End:  04/15/25               Short Term Goals              1 Patient will demonstrate independence with yoga Home Exercise Program to increase strength and endurance for ADL's. Progressing  2 Patient will demonstrate independence with relaxation techniques to manage stress for immune health. Progressing  3 Patient will verbalize good understanding of stress/immune health relationship.            Pt. will be independent for diaphragmatic breathing (Progressing)       Start:  01/24/25    Expected End:  03/15/25            Pt. will require minimal assistance for yoga HEP  (Progressing)       Start:  01/24/25    Expected End:  03/15/25            Pt. will require minimal assistance for yoga relaxation techniques to assist with immune health (Progressing)       Start:  01/24/25    Expected End:  03/15/25

## 2025-02-04 ENCOUNTER — CLINICAL SUPPORT (OUTPATIENT)
Dept: REHABILITATION | Facility: HOSPITAL | Age: 70
End: 2025-02-04
Payer: MEDICARE

## 2025-02-04 DIAGNOSIS — F43.29 STRESS AND ADJUSTMENT REACTION: ICD-10-CM

## 2025-02-04 DIAGNOSIS — R53.81 PHYSICAL DECONDITIONING: Primary | ICD-10-CM

## 2025-02-04 PROCEDURE — 97112 NEUROMUSCULAR REEDUCATION: CPT

## 2025-02-04 PROCEDURE — 97535 SELF CARE MNGMENT TRAINING: CPT

## 2025-02-04 PROCEDURE — 97110 THERAPEUTIC EXERCISES: CPT

## 2025-02-04 NOTE — PROGRESS NOTES
Outpatient Rehab    Occupational Therapy Visit    Patient Name: Caitlyn Grace  MRN: [id number]  YOB: 1955  Today's Date: 2/4/2025    Therapy Diagnosis:   Encounter Diagnoses   Name Primary?    Physical deconditioning Yes    Stress and adjustment reaction      Physician: Dubinsky, Joanna L., PA*    Physician Orders: Eval and Treat    Medical Diagnosis: Endometrial cancer [C54.1]      Visit # / Visits Authorized: 7 /12   Date of Evaluation:  7/22/24  Insurance Authorization Period: 1/1/25 to 12/31/25  Plan of Care Certification:  1/14/25 to 4/14/25                 Time In: 0100   Time Out: 0145  Total Time: 45   Total Billable Time: 45         Subjective   My shoulders and neck are sore from PT.  Pain reported as 3/10. bilateral shoulders and neck due to stress    Objective   Patient Specific Functional Scale:           Activity 7/22/24 11/12/24 1/14/25       Endurance  4       6     7       2.   sleep 3       5      6       3.  deconditioned 5       5      6       4.  stress 3       5       7       5.             6.             SCORE   3.75     5.25    6.5          Total Score = Sum of activity scores / number of activities  Minimum Detectable Change (90% CII) for average score = 2 points  Minimum Detectable Change (90% CI) for single activity score = 3 points          Treatment:  Therapeutic Exercise  Therapeutic Exercise Activity 1: YOGA EXERCISE:  SEATED IN CHAIR-cat-cow, forward fold, cobra backbend, twist, figure 4, bilateral upper trap and levator scapulae release HEP,  Therapeutic Exercise Activity 2: YOGA EXERCUSE QUADRAPED- geovanna pose with forehead on block to self massage forehaed and temples  Therapeutic Exercise Activity 3: YOGA EXERCISE IN STANDING- down dog with chair  Therapeutic Exercise Activity 4: YOGA EXERCISE IN SUPINE: knee to chest sequence, happy baby sequence,    Self Cares and ADLs  Self Care/ADL Activity 1: RELAXATION;  body scan in supine and sitting; diaphragmatic  breath,(DB); viloma with 3 pauses in supine,  Self Care/ADL Activity 2: RESTORATIVE YOGA; supine with 2 bolsters under legs         Patient's spiritual, cultural, and educational needs considered and patient agreeable to plan of care and goals.     Assessment & Plan   Assessment: Pt. reported discomfort in her shoulders and neck so we focused on the relaxations/self care goals today.  Evaluation/Treatment Tolerance: Patient tolerated treatment well, Patient limited by pain  Education             Reviewed the relationship between the stress and relaxation response and immune health.       Plan: Continue to see weekly to address goals    Goals:   Active       Long Term Goals       Patient will demonstrate independence with yoga Home Exercise Program to increase strength and endurance for ADL's. (Progressing)       Start:  01/24/25    Expected End:  04/15/25            Patient will demonstrate independence with relaxation techniques to manage stress for immune health. Progressing (Progressing)       Start:  01/24/25    Expected End:  04/15/25            Patient will verbalize good understanding of stress/immune health relationship.  (Progressing)       Start:  01/24/25    Expected End:  04/15/25               Short Term Goals              1 Patient will demonstrate independence with yoga Home Exercise Program to increase strength and endurance for ADL's. Progressing  2 Patient will demonstrate independence with relaxation techniques to manage stress for immune health. Progressing  3 Patient will verbalize good understanding of stress/immune health relationship.            Pt. will be independent for diaphragmatic breathing (Met)       Start:  01/24/25    Expected End:  03/15/25    Resolved:  02/04/25         Pt. will require minimal assistance for yoga HEP  (Met)       Start:  01/24/25    Expected End:  03/15/25    Resolved:  02/04/25         Pt. will require minimal assistance for yoga relaxation techniques to assist  with immune health (Progressing)       Start:  01/24/25    Expected End:  03/15/25                Katerin Loyola OT

## 2025-02-18 ENCOUNTER — CLINICAL SUPPORT (OUTPATIENT)
Dept: REHABILITATION | Facility: HOSPITAL | Age: 70
End: 2025-02-18
Payer: MEDICARE

## 2025-02-18 DIAGNOSIS — R53.81 PHYSICAL DECONDITIONING: Primary | ICD-10-CM

## 2025-02-18 DIAGNOSIS — F43.29 STRESS AND ADJUSTMENT REACTION: ICD-10-CM

## 2025-02-18 PROCEDURE — 97535 SELF CARE MNGMENT TRAINING: CPT

## 2025-02-18 PROCEDURE — 97112 NEUROMUSCULAR REEDUCATION: CPT

## 2025-02-18 PROCEDURE — 97110 THERAPEUTIC EXERCISES: CPT

## 2025-02-18 NOTE — PROGRESS NOTES
Outpatient Rehab    Occupational Therapy Visit    Patient Name: Caitlyn Grace  MRN: [id number]  YOB: 1955  Today's Date: 2/18/2025    Therapy Diagnosis:   Encounter Diagnoses   Name Primary?    Physical deconditioning Yes    Stress and adjustment reaction      Physician: Dubinsky, Joanna L., PA*    Physician Orders: Eval and Treat     Medical Diagnosis: Endometrial cancer [C54.1]      Visit # / Visits Authorized: 8 /12   Date of Evaluation:  7/22/24  Insurance Authorization Period: 1/1/25 to 12/31/25  Plan of Care Certification:  1/14/25 to 4/14/25       Time In: 1300   Time Out: 1345  Total Time: 45   Total Billable Time: 45        Precautions     Cancer and standard      Subjective   I still have nausea and a headache all the time but I am continuing to function..         Objective           Treatment:  Therapeutic Exercise  Therapeutic Exercise Activity 1: YOGA EXERCISE:  SEATED IN CHAIR-cat-cow, forward fold, cobra backbend, twist, figure 4,  Therapeutic Exercise Activity 2: puppy flow,  Therapeutic Exercise Activity 3: YOGA EXERCISE IN STANDING- down dog with chair, warrior 2, chair pose,  Therapeutic Exercise Activity 4: YOGA EXERCISE IN SUPINE: knee to chest sequence, happy baby sequence  on bolster, modified boat,twist x 2    Self Cares and ADLs  Self Care/ADL Activity 1: RELAXATION;  body scan in supine and sitting; diaphragmatic breath,(DB); viloma with 3 pauses in supine,  Self Care/ADL Activity 2: supine with bolster under knees    Balance/Neuromuscular Re-Education  Balance/Neuromuscular Re-Education Activity 1: verbal and tactile cues for neuromuscular awareness in exercise and relaxation techniques    Assessment & Plan   Assessment: In today's session, Caitlyn reviewed her  yoga HEP consisting of strengthening, stretching, and balance yoga exercise.  She also practiced  breathing and body scan techniques to assist with relaxation/immune health. She reported les nausea after session.   She tolerated the session well and required maximum verbal and neuromuscular cues in all treatment.       Patient will continue to benefit from skilled outpatient occupational therapy to address the deficits listed in the problem list box on initial evaluation, provide pt/family education and to maximize pt's level of independence in the home and community environment.     Patient's spiritual, cultural, and educational needs considered and patient agreeable to plan of care and goals.     Education             Reviewed the relationship between the stress and relaxation response and immune health.       Plan: continue with OT goals during weekly sessions.    Goals:   Active       Long Term Goals       Patient will demonstrate independence with yoga Home Exercise Program to increase strength and endurance for ADL's. (Progressing)       Start:  01/24/25    Expected End:  04/15/25            Patient will demonstrate independence with relaxation techniques to manage stress for immune health. Progressing (Progressing)       Start:  01/24/25    Expected End:  04/15/25            Patient will verbalize good understanding of stress/immune health relationship.  (Progressing)       Start:  01/24/25    Expected End:  04/15/25               Short Term Goals              1 Patient will demonstrate independence with yoga Home Exercise Program to increase strength and endurance for ADL's. Progressing  2 Patient will demonstrate independence with relaxation techniques to manage stress for immune health. Progressing  3 Patient will verbalize good understanding of stress/immune health relationship.            Pt. will be independent for diaphragmatic breathing (Met)       Start:  01/24/25    Expected End:  03/15/25    Resolved:  02/04/25         Pt. will require minimal assistance for yoga HEP  (Met)       Start:  01/24/25    Expected End:  03/15/25    Resolved:  02/04/25         Pt. will require minimal assistance for yoga relaxation  techniques to assist with immune health (Progressing)       Start:  01/24/25    Expected End:  03/15/25                Katerin Loyola OT

## 2025-02-24 DIAGNOSIS — Z00.00 ENCOUNTER FOR MEDICARE ANNUAL WELLNESS EXAM: ICD-10-CM

## 2025-02-26 ENCOUNTER — LAB VISIT (OUTPATIENT)
Dept: LAB | Facility: HOSPITAL | Age: 70
End: 2025-02-26
Attending: INTERNAL MEDICINE
Payer: MEDICARE

## 2025-02-26 DIAGNOSIS — I26.94 MULTIPLE SUBSEGMENTAL PULMONARY EMBOLI WITHOUT ACUTE COR PULMONALE: ICD-10-CM

## 2025-02-26 DIAGNOSIS — I26.94 MULTIPLE SUBSEGMENTAL PULMONARY EMBOLI WITHOUT ACUTE COR PULMONALE: Primary | ICD-10-CM

## 2025-02-26 LAB
BASOPHILS # BLD AUTO: 0.05 K/UL (ref 0–0.2)
BASOPHILS NFR BLD: 0.8 % (ref 0–1.9)
D DIMER PPP IA.FEU-MCNC: 0.31 MG/L FEU
DIFFERENTIAL METHOD BLD: ABNORMAL
EOSINOPHIL # BLD AUTO: 0.1 K/UL (ref 0–0.5)
EOSINOPHIL NFR BLD: 1.2 % (ref 0–8)
ERYTHROCYTE [DISTWIDTH] IN BLOOD BY AUTOMATED COUNT: 12.8 % (ref 11.5–14.5)
HCT VFR BLD AUTO: 43.4 % (ref 37–48.5)
HGB BLD-MCNC: 13.8 G/DL (ref 12–16)
IMM GRANULOCYTES # BLD AUTO: 0.02 K/UL (ref 0–0.04)
IMM GRANULOCYTES NFR BLD AUTO: 0.3 % (ref 0–0.5)
LYMPHOCYTES # BLD AUTO: 2 K/UL (ref 1–4.8)
LYMPHOCYTES NFR BLD: 31.2 % (ref 18–48)
MCH RBC QN AUTO: 27.6 PG (ref 27–31)
MCHC RBC AUTO-ENTMCNC: 31.8 G/DL (ref 32–36)
MCV RBC AUTO: 87 FL (ref 82–98)
MONOCYTES # BLD AUTO: 0.5 K/UL (ref 0.3–1)
MONOCYTES NFR BLD: 7.1 % (ref 4–15)
NEUTROPHILS # BLD AUTO: 3.9 K/UL (ref 1.8–7.7)
NEUTROPHILS NFR BLD: 59.4 % (ref 38–73)
NRBC BLD-RTO: 0 /100 WBC
PLATELET # BLD AUTO: 337 K/UL (ref 150–450)
PMV BLD AUTO: 9.9 FL (ref 9.2–12.9)
RBC # BLD AUTO: 5 M/UL (ref 4–5.4)
WBC # BLD AUTO: 6.5 K/UL (ref 3.9–12.7)

## 2025-02-26 PROCEDURE — 36415 COLL VENOUS BLD VENIPUNCTURE: CPT | Performed by: INTERNAL MEDICINE

## 2025-02-26 PROCEDURE — 85025 COMPLETE CBC W/AUTO DIFF WBC: CPT | Performed by: INTERNAL MEDICINE

## 2025-02-26 PROCEDURE — 85379 FIBRIN DEGRADATION QUANT: CPT | Performed by: INTERNAL MEDICINE

## 2025-02-27 ENCOUNTER — OFFICE VISIT (OUTPATIENT)
Dept: HEMATOLOGY/ONCOLOGY | Facility: CLINIC | Age: 70
End: 2025-02-27
Payer: MEDICARE

## 2025-02-27 DIAGNOSIS — I26.90 ACUTE SEPTIC PULMONARY EMBOLISM WITHOUT ACUTE COR PULMONALE: Primary | ICD-10-CM

## 2025-02-27 RX ORDER — BENZONATATE 100 MG/1
CAPSULE ORAL
COMMUNITY
Start: 2024-12-24

## 2025-02-27 RX ORDER — ONDANSETRON 4 MG/1
TABLET, ORALLY DISINTEGRATING ORAL
COMMUNITY

## 2025-02-27 RX ORDER — METHOCARBAMOL 750 MG/1
750 TABLET, FILM COATED ORAL 3 TIMES DAILY
COMMUNITY

## 2025-02-27 RX ORDER — TRAMADOL HYDROCHLORIDE 50 MG/1
TABLET ORAL
COMMUNITY

## 2025-02-27 RX ORDER — AZITHROMYCIN 250 MG/1
TABLET, FILM COATED ORAL
COMMUNITY

## 2025-02-27 NOTE — PROGRESS NOTES
Hematology and Medical Oncology   Follow Up     02/27/2025    Reason For Referral: DVT's    Telemedicine Documentation:  The patient location is: home  The chief complaint leading to consultation is: DVTs    Visit type: audiovisual    Face to Face time with patient: 25  45 minutes of total time spent on the encounter, which includes face to face time and non-face to face time preparing to see the patient (eg, review of tests), Obtaining and/or reviewing separately obtained history, Documenting clinical information in the electronic or other health record, Independently interpreting results (not separately reported) and communicating results to the patient/family/caregiver, or Care coordination (not separately reported).         Each patient to whom he or she provides medical services by telemedicine is:  (1) informed of the relationship between the physician and patient and the respective role of any other health care provider with respect to management of the patient; and (2) notified that he or she may decline to receive medical services by telemedicine and may withdraw from such care at any time.    History of Present Ilness:   Caitlyn WILEY Bessydejan (Caitlyn) is a pleasant 69 y.o.female who presents virtually to discuss her blood clots and subsequent blood thinner needs.     --Diagnosed with COVID December 2023   --2/28/24 had endometrial cancer, stage 1A, no need for chemo post surgery.  --CTA on 4/27/24: Bilateral occlusive filling defects noted in the left lower lobe pulmonary vasculature involving segmental and subsegmental branches, lingular branches, and right upper lobe segmental, and right lower lobe segmental and subsegmental branches     Multiple family members with blood clots. Has a niece who is positive for Factor V Leiden. Her work up for FVL negative.     Has been on eliquis since that time.     Travels extensively. Going to [place] and then [place] until early January.     Has daily pressure in her  head, feels nauseated. ENT did CT that was clear.     Interval History:  Ms. Grace is doing very well. Stopped Eliquis on January 10th.     Energy and breathing remain unchanged. Happy to see her d-dimer is within normal range.    D-dimer of 0.31        PAST MEDICAL HISTORY:   Past Medical History:   Diagnosis Date    Abnormal ECG 05/02/2016 5/2/2016: Anterior T wave inversion. Normal stress echo 2016 Dr Grossman    Allergic rhinitis     ENT DR Meade, flonase bid    Bilateral hand pain 04/05/2022    Bunion of right foot 04/25/2019    MTP R 1st, [place]    Chronic gastric ulcer with hemorrhage 04/05/2022    Due to NSAIDS for L remington , sacroiliitis    Chronic left shoulder pain 04/18/2018    Endometrial cancer     Fever blister 04/18/2018    Gallbladder sludge 04/2014    general surgeon Dr Maxx Paige at [facility]    Gastric polyps     EGD 2015 Dr Brian    Hormone replacement therapy 04/05/2022    Postmenopausal vaginal bleeding 04/18/2018    Endometrial biopsy & tx by GYN Marv Smith 04/18/2018    Topical metronidazole    Seasonal allergic rhinitis due to pollen 04/25/2019    flonase daily, allergy testing in past, ENT Vaibhav    Seronegative spondyloarthropathy 05/02/2016    10/2015: Diagnosed.    Subclinical iodine-deficiency hypothyroidism 04/05/2022       PAST SURGICAL HISTORY:   Past Surgical History:   Procedure Laterality Date    APPENDECTOMY      laparoscopic same time as breezy    CHOLECYSTECTOMY      CYSTOSCOPY  02/28/2024    Procedure: CYSTOSCOPY;  Surgeon: Jodie Velazquez MD;  Location: [facility] OR;  Service: OB/GYN;;    HYSTERECTOMY      LYMPH NODE BIOPSY Bilateral 02/28/2024    Procedure: BIOPSY, LYMPH NODE;  Surgeon: Jodie Velazquez MD;  Location: [facility] OR;  Service: OB/GYN;  Laterality: Bilateral;    MAPPING, LYMPH NODE, SENTINEL Bilateral 02/28/2024    Procedure: MAPPING, LYMPH NODE, SENTINEL;  Surgeon: Jodie Velazquez MD;  Location: [facility] OR;  Service: OB/GYN;  Laterality:  Bilateral;    ROBOT-ASSISTED LAPAROSCOPIC ABDOMINAL HYSTERECTOMY USING DA DULCE MARIA XI N/A 2024    Procedure: XI ROBOTIC HYSTERECTOMY;  Surgeon: Jodie Velazquez MD;  Location: [facility];  Service: OB/GYN;  Laterality: N/A;  2 hr case    ROBOT-ASSISTED LAPAROSCOPIC SALPINGO-OOPHORECTOMY USING DA DULCE MARIA XI Bilateral 2024    Procedure: XI ROBOTIC SALPINGO-OOPHORECTOMY;  Surgeon: Jodie Velazquez MD;  Location: [facility] OR;  Service: OB/GYN;  Laterality: Bilateral;    SALIVARY GLAND SURGERY      R parotid, 2012, Dr Meade ENT    SURGICAL REMOVAL OF BONE SPUR         PAST SOCIAL HISTORY:   reports that she has never smoked. She has never used smokeless tobacco. She reports that she does not currently use alcohol. She reports that she does not use drugs.    FAMILY HISTORY:  Family History   Problem Relation Name Age of Onset    Cancer Mother  80        CLL    Alzheimer's disease Mother  83    Atrial fibrillation Mother      Hypertension Mother      Cancer Father          pancreatic,  81    Diabetes Father      Heart failure Father      Fibromyalgia Daughter Floridalma     Ovarian cancer Neg Hx      Uterine cancer Neg Hx      Breast cancer Neg Hx      Colon cancer Neg Hx         CURRENT MEDICATIONS:   Current Outpatient Medications   Medication Sig    benzonatate (TESSALON) 100 MG capsule TAKE 2 CAPSULES BY MOUTH THREE TIMES DAILY FOR COUGH    azithromycin (Z-SUSANA) 250 MG tablet FOLLOW PACKAGE DIRECTIONS    b complex vitamins tablet Take 1 tablet by mouth once daily.    beta-carotene,A,-vits C,E/mins (OCUVITE ORAL) Take by mouth.    budesonide (PULMICORT) 0.5 mg/2 mL nebulizer solution SMARTSI Packet(s) Both Nares Twice Daily    ELIQUIS 5 mg Tab Take 1 tablet (5 mg total) by mouth 2 (two) times daily.    Lactobacillus acidophilus (PROBIOTIC ORAL) Take 12.5 Billion Cells by mouth Daily. 1 capsule a day.    methocarbamoL (ROBAXIN) 750 MG Tab Take 750 mg by mouth 3 (three) times daily.    MULTIVITAMIN ORAL Take 1  tablet by mouth 2 (two) times daily.    NP THYROID 120 mg Tab Take 1 tablet by mouth every morning. Patient states she takes 90 mg.    ondansetron (ZOFRAN-ODT) 4 MG TbDL     prednisoLONE acetate (PRED FORTE) 1 % DrpS INSTILL 1 DROP INTO THE RIGHT EYE 3 TIMES A DAY    traMADoL (ULTRAM) 50 mg tablet     UNABLE TO FIND once daily. lumity    UNABLE TO FIND once daily. dosokap    UNABLE TO FIND once daily. omaprem    UNABLE TO FIND once daily. adrecor    valACYclovir (VALTREX) 1000 MG tablet 1 po bid x 2 d at onset of fever blister    vit B6-L. [id number]-ALA (NUFOLA) 25 mg-3,500 mcg DFE-1 mg-300 mg Cap Take 1 tablet by mouth once daily.    VITAMIN D2-VITAMIN K1 ORAL Take 1 tablet by mouth once daily.     No current facility-administered medications for this visit.     ALLERGIES:   Review of patient's allergies indicates:   Allergen Reactions    Clindamycin Rash    Doxycycline     Nsaids (non-steroidal anti-inflammatory drug) Other (See Comments)     Gastric ulcer-bleeding    Oxycodone Nausea And Vomiting    Percocet [oxycodone-acetaminophen] Nausea And Vomiting     Can take tylenol    Sulfa (sulfonamide antibiotics) Rash         Review of Systems:     Review of Systems   Constitutional:  Negative for appetite change and unexpected weight change.   HENT:   Negative for mouth sores.    Respiratory:  Negative for cough and shortness of breath.    Cardiovascular:  Negative for chest pain.   Gastrointestinal:  Negative for abdominal pain and diarrhea.   Musculoskeletal:  Positive for back pain.   Skin:  Negative for rash.   Neurological:  Positive for headaches.   Hematological:  Negative for adenopathy.   Psychiatric/Behavioral:  The patient is not nervous/anxious.         Physical Exam:   Limited secondary to virtual visit      ECOG Performance Status: (foot note - ECOG PS provided by Eastern Cooperative Oncology Group) 0 - Asymptomatic    Karnofsky Performance Score:  90%- Able to Carry on Normal Activity: Minor  Symptoms of Disease    Labs:   Lab Results   Component Value Date    WBC 6.50 02/26/2025    HGB 13.8 02/26/2025    HCT 43.4 02/26/2025     02/26/2025    CHOL 210 (H) 03/29/2022    TRIG 105 03/29/2022    HDL 51 03/29/2022    ALT 18 06/11/2024    AST 16 06/11/2024     06/11/2024    K 3.9 06/11/2024     06/11/2024    CREATININE 0.8 06/11/2024    BUN 11 06/11/2024    CO2 25 06/11/2024    TSH <0.010 (L) 05/24/2024         Imaging: Previous imaging has been reviewed     Assessment and Plan:     Mr. Grace is a pleasant 69 year old female with multiple PE's on after DOAC therapy.    Multiple subsegmental pulmonary emboli   --Did well on DOAC therapy -- completed Balwinder 10, 2025  --Will complete 6 months of therapy in the fall   --Given her extended travel planned for [place] will continue DOAC until she returns to [place] in early January  --Repeat D-dimer was more than 6 weeks off of DOAC therapy and is within normal range   --Okay for discontinuation of therapy  --Educated on the signs/symptoms of a new blood clot or PE      I have provided the patient with an opportunity to ask questions and have all questions answered to her satisfaction.       she will return to clinic as needed, but knows to call in the interim if symptoms change or should a problem arise.        Ashly Greer MD  Hematology and Medical Oncology  Bone Marrow Transplant  [facility], [place]        BMT Chart Routing      Follow up with physician No follow up needed.   Follow up with SHERRON    Provider visit type    Infusion scheduling note    Injection scheduling note    Labs    Imaging    Pharmacy appointment    Other referrals

## 2025-03-11 ENCOUNTER — CLINICAL SUPPORT (OUTPATIENT)
Dept: REHABILITATION | Facility: HOSPITAL | Age: 70
End: 2025-03-11
Payer: MEDICARE

## 2025-03-11 DIAGNOSIS — R53.81 PHYSICAL DECONDITIONING: Primary | ICD-10-CM

## 2025-03-11 DIAGNOSIS — F43.29 STRESS AND ADJUSTMENT REACTION: ICD-10-CM

## 2025-03-11 PROCEDURE — 97110 THERAPEUTIC EXERCISES: CPT

## 2025-03-11 PROCEDURE — 97535 SELF CARE MNGMENT TRAINING: CPT

## 2025-03-11 PROCEDURE — 97112 NEUROMUSCULAR REEDUCATION: CPT

## 2025-03-11 NOTE — PROGRESS NOTES
Outpatient Rehab    Occupational Therapy Visit    Patient Name: Caitlyn Garce  MRN: [id number]  YOB: 1955  Today's Date: 3/17/2025    Therapy Diagnosis:   Encounter Diagnoses   Name Primary?    Physical deconditioning Yes    Stress and adjustment reaction        Physician: Dubinsky, Joanna L., PA*    Physician Orders: Eval and Treat    Medical Diagnosis: Endometrial cancer [C54.1]      Visit # / Visits Authorized: 7 /12   Date of Evaluation:  7/22/24  Insurance Authorization Period: 1/1/25 to 12/31/25  Plan of Care Certification:  1/14/25 to 4/14/25                 Time In: 1300   Time Out: 1345  Total Time: 45   Total Billable Time: 45    Precautions     Standard and cancer      Subjective      Pain reported as 1/10. bilateral shoulders and neck due to stress    Objective   Patient Specific Functional Scale:           Activity 7/22/24 11/12/24 1/14/25       Endurance  4       6     7       2.   sleep 3       5      6       3.  deconditioned 5       5      6       4.  stress 3       5       7       5.             6.             SCORE   3.75     5.25    6.5          Total Score = Sum of activity scores / number of activities  Minimum Detectable Change (90% CII) for average score = 2 points  Minimum Detectable Change (90% CI) for single activity score = 3 points          Treatment:  Therapeutic Exercise  TE 1: STANDING: down dog with chair, chair pose/volcano, shoulder stretches, warrior 2,  TE 2: SEATED: cat-cow, forward fold, cobra, twist, figure 4  TE 4: YOGA EXERCISE IN SUPINE: knee to chest sequence, happy baby sequence  on bolster, modified boat,twist x 2  TE 5: SUPINE:  modified boat with block 3 x 5 breaths, knee to chest, knee to chest flow, happy baby flow, twist x 2              Patient's spiritual, cultural, and educational needs considered and patient agreeable to plan of care and goals.     Assessment & Plan   Assessment:       Education  Education was done with Patient.            Reviewed the relationship between the stress and relaxation response and immune health.         Plan:      Goals:   Active       Long Term Goals       Patient will demonstrate independence with yoga Home Exercise Program to increase strength and endurance for ADL's. (Progressing)       Start:  01/24/25    Expected End:  04/15/25            Patient will demonstrate independence with relaxation techniques to manage stress for immune health. Progressing (Progressing)       Start:  01/24/25    Expected End:  04/15/25            Patient will verbalize good understanding of stress/immune health relationship.  (Met)       Start:  01/24/25    Expected End:  04/15/25    Resolved:  03/17/25            Short Term Goals              1 Patient will demonstrate independence with yoga Home Exercise Program to increase strength and endurance for ADL's. Progressing  2 Patient will demonstrate independence with relaxation techniques to manage stress for immune health. Progressing  3 Patient will verbalize good understanding of stress/immune health relationship.            Pt. will be independent for diaphragmatic breathing (Met)       Start:  01/24/25    Expected End:  03/15/25    Resolved:  02/04/25         Pt. will require minimal assistance for yoga HEP  (Met)       Start:  01/24/25    Expected End:  03/15/25    Resolved:  02/04/25         Pt. will require minimal assistance for yoga relaxation techniques to assist with immune health (Progressing)       Start:  01/24/25    Expected End:  03/15/25                Katerin Loyola OT    Outpatient Rehab    Occupational Therapy Visit    Patient Name: Caitlyn Grace  MRN: [id number]  YOB: 1955  Today's Date: 3/17/2025    Therapy Diagnosis:   Encounter Diagnoses   Name Primary?    Physical deconditioning Yes    Stress and adjustment reaction        Physician: Dubinsky, Joanna L., PA*    Physician Orders: Eval and Treat     Medical Diagnosis: Endometrial cancer  [C54.1]      Visit # / Visits Authorized: 8 /12   Date of Evaluation:  7/22/24  Insurance Authorization Period: 1/1/25 to 12/31/25  Plan of Care Certification:  1/14/25 to 4/14/25       Time In: 1300   Time Out: 1345  Total Time: 45   Total Billable Time: 45        Precautions     Standard and cancer      Subjective   I am feeling much better and the headaches and neck pain are improving..  Pain reported as 1/10. bilateral shoulders and neck due to stress    Objective           Treatment:  Therapeutic Exercise  TE 1: STANDING: down dog with chair, chair pose/volcano, shoulder stretches, warrior 2,  TE 2: SEATED: cat-cow, forward fold, cobra, twist, figure 4  TE 4: YOGA EXERCISE IN SUPINE: knee to chest sequence, happy baby sequence  on bolster, modified boat,twist x 2  TE 5: SUPINE:  modified boat with block 3 x 5 breaths, knee to chest, knee to chest flow, happy baby flow, twist x 2              Assessment & Plan   Assessment: In today's session, Caitlyn reviewed her  yoga HEP consisting of strengthening, stretching, and balance yoga exercise.  She also practiced  breathing and body scan techniques to assist with relaxation/immune health. She reported that sheis using the relaxation body scan and breathing to help with stress as well as nausea. Caitlyn tolerated the session well and required minimum, (less), verbal and neuromuscular cues in all treatment.       Patient will continue to benefit from skilled outpatient occupational therapy to address the deficits listed in the problem list box on initial evaluation, provide pt/family education and to maximize pt's level of independence in the home and community environment.     Patient's spiritual, cultural, and educational needs considered and patient agreeable to plan of care and goals.     Education  Education was done with Patient.           Reviewed the relationship between the stress and relaxation response and immune health.         Plan: continue with OT goals  during weekly sessions.    Goals:   Active       Long Term Goals       Patient will demonstrate independence with yoga Home Exercise Program to increase strength and endurance for ADL's. (Progressing)       Start:  01/24/25    Expected End:  04/15/25            Patient will demonstrate independence with relaxation techniques to manage stress for immune health. Progressing (Progressing)       Start:  01/24/25    Expected End:  04/15/25            Patient will verbalize good understanding of stress/immune health relationship.  (Met)       Start:  01/24/25    Expected End:  04/15/25    Resolved:  03/17/25            Short Term Goals              1 Patient will demonstrate independence with yoga Home Exercise Program to increase strength and endurance for ADL's. Progressing  2 Patient will demonstrate independence with relaxation techniques to manage stress for immune health. Progressing  3 Patient will verbalize good understanding of stress/immune health relationship.            Pt. will be independent for diaphragmatic breathing (Met)       Start:  01/24/25    Expected End:  03/15/25    Resolved:  02/04/25         Pt. will require minimal assistance for yoga HEP  (Met)       Start:  01/24/25    Expected End:  03/15/25    Resolved:  02/04/25         Pt. will require minimal assistance for yoga relaxation techniques to assist with immune health (Progressing)       Start:  01/24/25    Expected End:  03/15/25                Katerin Loyola OT

## 2025-03-25 ENCOUNTER — CLINICAL SUPPORT (OUTPATIENT)
Dept: REHABILITATION | Facility: HOSPITAL | Age: 70
End: 2025-03-25
Payer: MEDICARE

## 2025-03-25 DIAGNOSIS — R53.81 PHYSICAL DECONDITIONING: Primary | ICD-10-CM

## 2025-03-25 DIAGNOSIS — F43.29 STRESS AND ADJUSTMENT REACTION: ICD-10-CM

## 2025-03-25 PROCEDURE — 97535 SELF CARE MNGMENT TRAINING: CPT

## 2025-03-25 PROCEDURE — 97110 THERAPEUTIC EXERCISES: CPT

## 2025-03-25 PROCEDURE — 97112 NEUROMUSCULAR REEDUCATION: CPT

## 2025-03-26 NOTE — PROGRESS NOTES
Outpatient Rehab    Occupational Therapy Visit    Patient Name: Caitlyn Grace  MRN: [id number]  YOB: 1955  Encounter Date: 3/25/2025    Therapy Diagnosis:   Encounter Diagnoses   Name Primary?    Physical deconditioning Yes    Stress and adjustment reaction      Physician: Dubinsky, Joanna L., PA*    Physician Orders: Eval and Treat  Medical Diagnosis: Endometrial cancer [C54.1]       Insurance Authorization Period: 1/1/2025 to 12/31/2025  Date of Evaluation: 7/22/24  Plan of Care Certification: 1/14/25 to 4/14/25  Visit #:  11/12     Time In: 1300   Time Out: 1345  Total Time: 45  Total Billable Time: 4545           Subjective   I am planning to [facility] yoga [facility]         Objective   Patient Specific Functional Scale:           Activity 7/22/24 11/12/24   1/14/25  3/25/25     Endurance  4       6     7     8     2.   sleep 3       5      6     7     3.  deconditioned 5       5      6     8     4.  stress 3       5       7     9     5.             6.             SCORE   3.75     5.25    6.5  8.0        Total Score = Sum of activity scores / number of activities  Minimum Detectable Change (90% CII) for average score = 2 points  Minimum Detectable Change (90% CI) for single activity score = 3 points                Treatment:  Therapeutic Exercise  TE 1: STANDING: down dog with chair, chair pose/volcano, shoulder stretches, warrior 2,  TE 2: SEATED: cat-cow, forward fold, cobra, twist, figure 4  TE 4: YOGA EXERCISE IN SUPINE: knee to chest sequence, happy baby sequence  on bolster, modified boat,twist x 2, bridge,    Time Entry(in minutes):  Neuromuscular Re-Education Time Entry: 15  Self Care/Home Management (ADLs) Time Entry: 15  Therapeutic Exercise Time Entry: 15    Assessment & Plan   Assessment:         Patient's spiritual, cultural, and educational needs considered and patient agreeable to plan of care and goals.     Education  Education was done with  Patient. The patient's learning style includes Demonstration, Listening, and Tactile.          Reviewed physiology of the relaxation and stress response and how yoga/meditation affect neuroendocrine system and immune health.       Plan: Discharge next session    Goals:   Active       Long Term Goals       Patient will demonstrate independence with yoga Home Exercise Program to increase strength and endurance for ADL's. (Progressing)       Start:  01/24/25    Expected End:  04/15/25            Patient will demonstrate independence with relaxation techniques to manage stress for immune health. Progressing (Progressing)       Start:  01/24/25    Expected End:  04/15/25    Resolved:  03/17/25         Patient will verbalize good understanding of stress/immune health relationship.  (Met)       Start:  01/24/25    Expected End:  04/15/25    Resolved:  03/18/25           Resolved       Short Term Goals              1 Patient will demonstrate independence with yoga Home Exercise Program to increase strength and endurance for ADL's. Progressing  2 Patient will demonstrate independence with relaxation techniques to manage stress for immune health. Progressing  3 Patient will verbalize good understanding of stress/immune health relationship.            Pt. will be independent for diaphragmatic breathing (Met)       Start:  01/24/25    Expected End:  03/15/25    Resolved:  03/18/25         Pt. will require minimal assistance for yoga HEP  (Met)       Start:  01/24/25    Expected End:  03/15/25    Resolved:  02/04/25         Pt. will require minimal assistance for yoga relaxation techniques to assist with immune health (Met)       Start:  01/24/25    Expected End:  03/15/25    Resolved:  03/18/25             Katerin Loyola OT

## 2025-04-15 ENCOUNTER — CLINICAL SUPPORT (OUTPATIENT)
Dept: REHABILITATION | Facility: HOSPITAL | Age: 70
End: 2025-04-15
Payer: MEDICARE

## 2025-04-15 DIAGNOSIS — F43.29 STRESS AND ADJUSTMENT REACTION: ICD-10-CM

## 2025-04-15 DIAGNOSIS — R53.81 PHYSICAL DECONDITIONING: Primary | ICD-10-CM

## 2025-04-15 PROCEDURE — 97110 THERAPEUTIC EXERCISES: CPT

## 2025-04-15 PROCEDURE — 97112 NEUROMUSCULAR REEDUCATION: CPT

## 2025-04-15 PROCEDURE — 97535 SELF CARE MNGMENT TRAINING: CPT

## 2025-04-16 NOTE — PROGRESS NOTES
Outpatient Rehab    Occupational Therapy Discharge    Patient Name: Caitlyn Grace  MRN: [id number]  YOB: 1955  Encounter Date: 4/15/2025    Therapy Diagnosis:   Encounter Diagnoses   Name Primary?    Physical deconditioning Yes    Stress and adjustment reaction      Physician: Dubinsky, Joanna L., PA*    Physician Orders: Eval and Treat  Medical Diagnosis: Endometrial cancer    Visit # / Visits Authorized: 8 / 20  Insurance Authorization Period: 1/1/2025 to 12/31/2025  Date of Evaluation: 7/22/24  Plan of Care Certification: 4/15/25     Time In: 1300   Time Out: 1345  Total Time: 45   Total Billable Time: 45      Subjective   I have been busy with houseguests and havent been able to pracice as much at home..         Objective     Patient Specific Functional Scale:           Activity 7/22/24 11/12/24   1/14/25  3/25/25  4/14/25   Endurance  4       6     7     8     8   2.   sleep 3       5      6     7      8   3.  deconditioned 5       5      6     8      9   4.  stress 3       5       7     9      9   5.             6.             SCORE   3.75     5.25    6.5  8.0     8.5      Total Score = Sum of activity scores / number of activities  Minimum Detectable Change (90% CII) for average score = 2 points  Minimum Detectable Change (90% CI) for single activity score = 3 points          Treatment:  Therapeutic Exercise  TE 1: STANDING:down dog with chair, chair pose, volcano shoulder stretches,wide straddle forward fold, triangle, side angle,  TE 3: QUADRAPED: cat-cow, puppy flow, camel,  TE 4: SUPINE: knee to chest flow, happy baby flow, bridge, modified boat with block, twist x 3  TE 7: BREATHING TECHNIQUES: diaphragmatic breathing, viloma breath, bramari breath,  TE 8: MEDITATION TECHNIQUES: body scan in supine, sit and stand  Self Care and ADLs  ADL 1: BREATHING TECHNIQUES: Diaphragmatic breathing, Viloma breath,bramari breath, ujaii breath,  ADL 2: MEDITATION TECHNIQUES:  body scan in supine,   sit,  and stand; mettaloving kindness  ADL 3: BREATHING TECHNIQUES: diaphragmatic breathing, viloma breath, bramari breath, ujaii breath,  Balance/Neuromuscular Re-Education  NMR 1: Pt. required tactile and verbal neuromuscular cues for yoga exercise and relaxation techniques    Time Entry(in minutes):  Neuromuscular Re-Education Time Entry: 15  Self Care/Home Management (ADLs) Time Entry: 15  Therapeutic Exercise Time Entry: 15    Assessment & Plan   Assessment: In today's DC session, Caitlyn reviewed her yoga HEP consisting of strengthening, stretching, and balance yoga exercise. She also practiced breathing and body scan techniques to assist with relaxation/immune health. She tolerated the session well.  She has met all of her goals and was independent with her HEP. She  identified her plan for integrating yoga in to her routine.       Patient's spiritual, cultural, and educational needs considered and patient agreeable to plan of care and goals.     Education  Education was done with Patient. The patient's learning style includes Demonstration, Listening, and Tactile.          Reviewed physiology of the relaxation and stress response and how yoga/meditation affect neuroendocrine system and immune health.       Plan: discharge today.    Goals:   Resolved       Long Term Goals       Patient will demonstrate independence with yoga Home Exercise Program to increase strength and endurance for ADL's. (Met)       Start:  01/24/25    Expected End:  04/15/25    Resolved:  04/15/25         Patient will demonstrate independence with relaxation techniques to manage stress for immune health. Progressing (Progressing)       Start:  01/24/25    Expected End:  04/15/25    Resolved:  03/17/25         Patient will verbalize good understanding of stress/immune health relationship.  (Met)       Start:  01/24/25    Expected End:  04/15/25    Resolved:  03/18/25            Short Term Goals              1 Patient will demonstrate  independence with yoga Home Exercise Program to increase strength and endurance for ADL's. Progressing  2 Patient will demonstrate independence with relaxation techniques to manage stress for immune health. Progressing  3 Patient will verbalize good understanding of stress/immune health relationship.            Pt. will be independent for diaphragmatic breathing (Met)       Start:  01/24/25    Expected End:  03/15/25    Resolved:  03/18/25         Pt. will require minimal assistance for yoga HEP  (Met)       Start:  01/24/25    Expected End:  03/15/25    Resolved:  02/04/25         Pt. will require minimal assistance for yoga relaxation techniques to assist with immune health (Met)       Start:  01/24/25    Expected End:  03/15/25    Resolved:  03/18/25             Katerin Loyola OT

## 2025-04-17 ENCOUNTER — TELEPHONE (OUTPATIENT)
Dept: GYNECOLOGIC ONCOLOGY | Facility: CLINIC | Age: 70
End: 2025-04-17
Payer: MEDICARE

## 2025-05-05 ENCOUNTER — OFFICE VISIT (OUTPATIENT)
Dept: GYNECOLOGIC ONCOLOGY | Facility: CLINIC | Age: 70
End: 2025-05-05
Payer: MEDICARE

## 2025-05-05 VITALS
HEART RATE: 71 BPM | DIASTOLIC BLOOD PRESSURE: 83 MMHG | SYSTOLIC BLOOD PRESSURE: 139 MMHG | WEIGHT: 136.44 LBS | BODY MASS INDEX: 22.71 KG/M2

## 2025-05-05 DIAGNOSIS — R11.0 NAUSEA: ICD-10-CM

## 2025-05-05 DIAGNOSIS — Z85.42 HISTORY OF ENDOMETRIAL CANCER: Primary | ICD-10-CM

## 2025-05-05 DIAGNOSIS — R10.11 CHRONIC RUQ PAIN: ICD-10-CM

## 2025-05-05 DIAGNOSIS — G89.29 CHRONIC RUQ PAIN: ICD-10-CM

## 2025-05-05 PROCEDURE — 99214 OFFICE O/P EST MOD 30 MIN: CPT | Mod: PBBFAC | Performed by: OBSTETRICS & GYNECOLOGY

## 2025-05-05 PROCEDURE — 99999 PR PBB SHADOW E&M-EST. PATIENT-LVL IV: CPT | Mod: PBBFAC,,, | Performed by: OBSTETRICS & GYNECOLOGY

## 2025-05-05 NOTE — PROGRESS NOTES
GYN ONC     SUBJECTIVE:     Chief Complaint:  SURVEILLANCE ENDOMETRIAL CANCER, Stage IA Grade 2   History of Present Illness:  Caitlyn Grace is a 69 y.o. female who is here for surveillance visit for endometrial cancer.  Doing well today.  She denies vaginal bleeding or pelvic pain.   She does report  nausea and RUQ pain, intermittent but ongoing since last visit. Not improved on a GI regimen she was placed on by her functional medicine doctor.    She denies unintentional weight loss ,early satiety, or changes in bowel or bladder habits.      Oncology History   History of endometrial cancer   2/28/2024 Surgery    RATLH BSO SLNB   Grade 2 Endometrioid Adenocarcinoma, Superficial myometrial invasion (<50%), No LVSI   MMR: intact  P53: wild type  [place] Lymph Nodes: NEGATIVE   [place] Review:  COMMENT   Thank you for the opportunity to participate in this patient's care. I agree with your diagnosis of FIGO grade 2 endometrioid adenocarcinoma of the endometrium. Your p53 immunostain demonstrates a wild type staining pattern in the tumor. The tumor   involves   adenomyosis but I believe there is evidence of superficial myoinvasion on slide 3D; the extent of myoinvasion does not appear to be sufficient to upstage the tumor. In the absence of cervical involvement or involvement of lymph nodes or other organs,   this would still be   compatible with stage IA regardless of whether there is superficial myoinvasion (less than 50% of the myometrial thickness) or not.        2/28/2024 Initial Diagnosis    Endometrial cancer        Review of Systems:  Review of Systems per HPI      OBJECTIVE:     Physical Exam:   Vitals:    05/05/25 1306   BP: 139/83   Pulse: 71      Physical Exam  Vitals reviewed. Exam conducted with a chaperone present.   Constitutional:       Appearance: Normal appearance.   Cardiovascular:      Rate and Rhythm: Normal rate.   Pulmonary:      Effort: Pulmonary effort is normal.   Abdominal:      General:  Abdomen is flat.      Palpations: Abdomen is soft.      Tenderness: There is abdominal tenderness in the right upper quadrant. There is no guarding or rebound.      Hernia: No hernia is present.   Genitourinary:     General: Normal vulva.      Vagina: Normal.      Uterus: Absent.       Adnexa:         Right: No mass.          Left: No mass.        Comments: No mass, no lesions, no nodularity. No evidence of disease recurrence.    Neurological:      General: No focal deficit present.      Mental Status: She is alert and oriented to person, place, and time.   Psychiatric:         Mood and Affect: Mood normal.         Behavior: Behavior normal.         Thought Content: Thought content normal.         Judgment: Judgment normal.       ASSESSMENT:       ICD-10-CM ICD-9-CM    1. History of endometrial cancer  Z85.42 V10.42 CT Abdomen Pelvis W Wo Contrast      Creatinine, serum      Ambulatory referral/consult to Gastroenterology      2. Nausea  R11.0 787.02 Ambulatory referral/consult to Gastroenterology      3. Chronic RUQ pain  R10.11 789.01 Ambulatory referral/consult to Gastroenterology    G89.29 338.29          Plan:      H/o Endometrial Cancer   No evidence of disease on today's exam.   Discussed signs and symptoms of recurrence and when to seek care between exams.   RTC 6 months for next surveillance    2. Nausea, RUQ Pain   We discussed recommendation for CT AP given her intermittent nausea and RUQ pain, now s/p negative Abd US and failed medical mgmt.  Ordered and scheduled today.   Also recommend referral to GI, placed today        Jodie Velazquez MD  Gynecologic Oncology     ONGOING COMPLEXITY BILLING  Visit today is associated with current or anticipated ongoing medical care related to this patients single serious condition/complex condition: History Of Endometrial cancer

## 2025-05-08 ENCOUNTER — TELEPHONE (OUTPATIENT)
Dept: PRIMARY CARE CLINIC | Facility: CLINIC | Age: 70
End: 2025-05-08
Payer: MEDICARE

## 2025-05-08 ENCOUNTER — OFFICE VISIT (OUTPATIENT)
Dept: PRIMARY CARE CLINIC | Facility: CLINIC | Age: 70
End: 2025-05-08
Payer: MEDICARE

## 2025-05-08 VITALS
DIASTOLIC BLOOD PRESSURE: 76 MMHG | HEART RATE: 68 BPM | TEMPERATURE: 99 F | WEIGHT: 138.88 LBS | OXYGEN SATURATION: 98 % | HEIGHT: 65 IN | BODY MASS INDEX: 23.14 KG/M2 | SYSTOLIC BLOOD PRESSURE: 130 MMHG

## 2025-05-08 DIAGNOSIS — Z12.31 OTHER SCREENING MAMMOGRAM: ICD-10-CM

## 2025-05-08 DIAGNOSIS — Z86.711 HISTORY OF PULMONARY EMBOLISM: ICD-10-CM

## 2025-05-08 DIAGNOSIS — R11.0 NAUSEA: ICD-10-CM

## 2025-05-08 DIAGNOSIS — H93.11 TINNITUS OF RIGHT EAR: ICD-10-CM

## 2025-05-08 DIAGNOSIS — Z78.0 POSTMENOPAUSAL: ICD-10-CM

## 2025-05-08 DIAGNOSIS — Z85.42 HISTORY OF ENDOMETRIAL CANCER: ICD-10-CM

## 2025-05-08 DIAGNOSIS — J06.9 UPPER RESPIRATORY TRACT INFECTION, UNSPECIFIED TYPE: Primary | ICD-10-CM

## 2025-05-08 PROCEDURE — 99214 OFFICE O/P EST MOD 30 MIN: CPT | Mod: S$PBB,,, | Performed by: FAMILY MEDICINE

## 2025-05-08 PROCEDURE — G2211 COMPLEX E/M VISIT ADD ON: HCPCS | Mod: S$PBB,,, | Performed by: FAMILY MEDICINE

## 2025-05-08 PROCEDURE — 99999 PR PBB SHADOW E&M-EST. PATIENT-LVL IV: CPT | Mod: PBBFAC,,, | Performed by: FAMILY MEDICINE

## 2025-05-08 PROCEDURE — 99214 OFFICE O/P EST MOD 30 MIN: CPT | Mod: PBBFAC,PN | Performed by: FAMILY MEDICINE

## 2025-05-08 RX ORDER — ONDANSETRON 4 MG/1
4 TABLET, ORALLY DISINTEGRATING ORAL 3 TIMES DAILY PRN
Qty: 21 TABLET | Refills: 0 | Status: SHIPPED | OUTPATIENT
Start: 2025-05-08 | End: 2025-05-15

## 2025-05-08 NOTE — ASSESSMENT & PLAN NOTE
Multiple subsegmental pulmonary emboli 4/25 flight to [place] after hyst 2/24 for endometrial CA  Tx x 6 months of therapy

## 2025-05-08 NOTE — TELEPHONE ENCOUNTER
"----- Message from Grover Durham NP sent at 5/8/2025 11:33 AM CDT -----  Regarding: annual exam  Order labs.Include TSH, free T4.  Schedule annual exam in near future. Schedule labs.  ----- Message -----  From: Koki Galvez MD  Sent: 6/4/2024   8:33 AM CDT  To: Kristan Shahid MD    Hi! This is a mutual patient of ours. I saw her in Integrative Oncology and cancer survivorship. She has been seeing an NP on the [facility] who is treating her with [place] Thyroid. Her levels are WAY off. I am not convinced that she has hypothyroid. I think NP was "optimizing" her which is not good.  Any thoughts on getting her off of Axtell?  TSH is <.010  Free T3 5.0  Thank you!  Uzma Galvez  "

## 2025-05-08 NOTE — PROGRESS NOTES
Assessment:     1. Upper respiratory tract infection, unspecified type    2. History of endometrial cancer    3. History of pulmonary embolism    4. Nausea    5. Tinnitus of right ear    6. Other screening mammogram    7. Postmenopausal      Plan:     History of pulmonary embolism  Multiple subsegmental pulmonary emboli 4/25 flight to [place] after hyst 2/24 for endometrial CA  Tx x 6 months of therapy     Nausea  Dr Velazquez ordered CT abd pelvis , nausea since JJ    Had Cholecystectomy in past    I sent ZOfran prn    She will make appt w ENT for evaluation of inner ear w ENT Dr Ebenezer Gilman at [facility] [phone]    Has appointment June 20th with gastroenterology NP Labat      History of endometrial cancer  Stable, follo w appointment November 3rd with gynecology oncologist Dr. Velazquez    Tinnitus of right ear  [facility] referred to ENT ,s he will make appt w ENT Ebenezer Gilman at [facility] or possible vertigo    URI (upper respiratory infection)  Sore throat w PND, mild cough x 2d. No fever    CLaritin nightly  Increase fluids  Tylenol 3 times a day for 3d    Come in for evaluation if not better      Mammo     DEXA    Move more, High fiber, good fat (avocado, olive oil, nuts) foods  Eat more food grown from the earth (picked from trees or out of the ground)  Colon cancer screening at 46 yo  Follow up yearly with LABS ONE WEEK PRIOR so we can discuss at your visit      HPI: Caitlyn Grace is a 69 y.o. female with is here today for follow up    2/24 total hysterectomy for endometrial carcinoma    History of Present Illness    CHIEF COMPLAINT:  Patient presents today for follow up of multiple medical concerns.    CURRENT SYMPTOMS:  She reports a sore throat that started 2 days ago.  She has worsening tinnitus that is now constant rather than occasional. She experiences episodes of feeling off balance, described as wave-like sensations. She also reports ongoing nausea and stomach issues since  "Endometrial cancer TAHBSO surgery.    ENT HISTORY:  She has a known cholesteatoma in the left ear.     NEUROLOGICAL HISTORY:  Neurologist Dr. David Alva suspected neck involvement. She completed 2 months of physical therapy for neck issues with methocarbamol treatment, reporting improved neck comfort but no improvement in other symptoms.    MEDICAL HISTORY:  History notable for COVID infection on 3-4 occasions, remote history of peptic ulcer disease, and previous cholecystectomy.    COAGULATION HISTORY:  She discontinued Eliquis at the end of January after completing extended anticoagulation treatment for blood clots. Treatment duration was prolonged beyond the initial 6-month course since she wanted to continue with flying to [place] again    PREVENTIVE CARE:  Mammogram and bone density scan are due in . Previous studies were performed at [facility] in May of last year.      ROS:  ROS as indicated in HPI.             Current Outpatient Medications   Medication Instructions    beta-carotene,A,-vits C,E/mins (OCUVITE ORAL) Take by mouth.    budesonide (PULMICORT) 0.5 mg/2 mL nebulizer solution SMARTSI Packet(s) Both Nares Twice Daily    Lactobacillus acidophilus (PROBIOTIC ORAL) 12.5 Billion Cells, Daily    methocarbamoL (ROBAXIN) 750 mg, 3 times daily    MULTIVITAMIN ORAL 1 tablet, 2 times daily    NP THYROID 120 mg Tab 1 tablet, Every morning    ondansetron (ZOFRAN-ODT) 4 mg, Oral, 3 times daily PRN    UNABLE TO FIND Daily    UNABLE TO FIND Daily    UNABLE TO FIND Daily    UNABLE TO FIND Daily    valACYclovir (VALTREX) 1000 MG tablet 1 po bid x 2 d at onset of fever blister    vit B6-L. kxezqnjz-iu-H57-ALA (NUFOLA) 25 mg-3,500 mcg DFE-1 mg-300 mg Cap 1 tablet, Daily    VITAMIN D2-VITAMIN K1 ORAL 1 tablet, Daily       No results found for: "HGBA1C"  No results found for: "MICALBCREAT"  Lab Results   Component Value Date    LDLCALC 138.0 2022    LDLCALC 134.8 04/15/2019    CHOL 210 (H) 2022    " HDL 51 03/29/2022    TRIG 105 03/29/2022       Lab Results   Component Value Date     06/11/2024    K 3.9 06/11/2024     06/11/2024    CO2 25 06/11/2024     06/11/2024    BUN 11 06/11/2024    CREATININE 0.8 06/11/2024    CALCIUM 9.8 06/11/2024    PROT 7.2 06/11/2024    ALBUMIN 4.1 06/11/2024    BILITOT 0.4 06/11/2024    ALKPHOS 99 06/11/2024    AST 16 06/11/2024    ALT 18 06/11/2024    ANIONGAP 11 06/11/2024    ESTGFRAFRICA >60.0 03/29/2022    EGFRNONAA >60.0 03/29/2022    WBC 6.50 02/26/2025    HGB 13.8 02/26/2025    HGB 13.1 09/20/2024    HCT 43.4 02/26/2025    MCV 87 02/26/2025     02/26/2025    TSH <0.010 (L) 05/24/2024    HEPCAB Negative 05/05/2016       Lab Results   Component Value Date    FSH 27 05/15/2008    ESTRADIOL 111 05/20/2008         Past Medical History:   Diagnosis Date    Abnormal ECG 05/02/2016 5/2/2016: Anterior T wave inversion. Normal stress echo 2016 Dr Grossman    Allergic rhinitis     ENT DR Meade, flonase bid    Bilateral hand pain 04/05/2022    Bunion of right foot 04/25/2019    MTP R 1st, [place]    Chronic gastric ulcer with hemorrhage 04/05/2022    Due to NSAIDS for L shoudler , sacroiliitis    Chronic left shoulder pain 04/18/2018    Endometrial cancer     Fever blister 04/18/2018    Gallbladder sludge 04/2014    general surgeon Dr Maxx Paige at [facility]    Gastric polyps     EGD 2015 Dr Brian    Hormone replacement therapy 04/05/2022    Postmenopausal vaginal bleeding 04/18/2018    Endometrial biopsy & tx by GYN Marv Smith 04/18/2018    Topical metronidazole    Seasonal allergic rhinitis due to pollen 04/25/2019    flonase daily, allergy testing in past, ENT Vaibhav    Seronegative spondyloarthropathy 05/02/2016    10/2015: Diagnosed.    Subclinical iodine-deficiency hypothyroidism 04/05/2022     Past Surgical History:   Procedure Laterality Date    APPENDECTOMY      laparoscopic same time as breezy    CHOLECYSTECTOMY      CYSTOSCOPY   "02/28/2024    Procedure: CYSTOSCOPY;  Surgeon: Jodie Velazquez MD;  Location: [facility] OR;  Service: OB/GYN;;    HYSTERECTOMY      LYMPH NODE BIOPSY Bilateral 02/28/2024    Procedure: BIOPSY, LYMPH NODE;  Surgeon: Jodie Velazquez MD;  Location: [facility] OR;  Service: OB/GYN;  Laterality: Bilateral;    MAPPING, LYMPH NODE, SENTINEL Bilateral 02/28/2024    Procedure: MAPPING, LYMPH NODE, SENTINEL;  Surgeon: Jodie Velazquez MD;  Location: [facility] OR;  Service: OB/GYN;  Laterality: Bilateral;    ROBOT-ASSISTED LAPAROSCOPIC ABDOMINAL HYSTERECTOMY USING DA DULCE MARIA XI N/A 02/28/2024    Procedure: XI ROBOTIC HYSTERECTOMY;  Surgeon: Jodie Velazquez MD;  Location: [facility] OR;  Service: OB/GYN;  Laterality: N/A;  2 hr case    ROBOT-ASSISTED LAPAROSCOPIC SALPINGO-OOPHORECTOMY USING DA DULCE MARIA XI Bilateral 02/28/2024    Procedure: XI ROBOTIC SALPINGO-OOPHORECTOMY;  Surgeon: Jodie Velazquez MD;  Location: [facility] OR;  Service: OB/GYN;  Laterality: Bilateral;    SALIVARY GLAND SURGERY      R parotid, August 2012, Dr Meade ENT    SURGICAL REMOVAL OF BONE SPUR       Vitals:    05/08/25 1204   BP: 130/76   Pulse: 68   Temp: 98.8 °F (37.1 °C)   TempSrc: Oral   SpO2: 98%   Weight: 63 kg (138 lb 14.2 oz)   Height: 5' 5" (1.651 m)   PainSc: 0-No pain     Wt Readings from Last 5 Encounters:   05/08/25 63 kg (138 lb 14.2 oz)   05/05/25 61.9 kg (136 lb 7.4 oz)   10/21/24 61.7 kg (136 lb 0.4 oz)   10/17/24 61.5 kg (135 lb 9.3 oz)   06/18/24 62.4 kg (137 lb 9.1 oz)     Objective:   Physical Exam  Vitals and nursing note reviewed.   Constitutional:       General: She is not in acute distress.     Appearance: She is well-developed.   HENT:      Right Ear: Tympanic membrane and external ear normal.      Left Ear: Tympanic membrane and external ear normal.      Nose: Mucosal edema present. No rhinorrhea.      Right Sinus: No maxillary sinus tenderness or frontal sinus tenderness.      Left Sinus: No maxillary sinus tenderness or frontal sinus tenderness.      " Mouth/Throat:      Pharynx: No oropharyngeal exudate or posterior oropharyngeal erythema.   Eyes:      Pupils: Pupils are equal, round, and reactive to light.   Neck:      Thyroid: No thyromegaly.   Cardiovascular:      Rate and Rhythm: Normal rate and regular rhythm.      Heart sounds: Normal heart sounds. No murmur heard.  Pulmonary:      Effort: Pulmonary effort is normal.      Breath sounds: Normal breath sounds. No wheezing or rales.   Abdominal:      General: Bowel sounds are normal. There is no distension.      Palpations: Abdomen is soft. There is no mass.      Tenderness: There is no abdominal tenderness. There is no guarding or rebound.   Musculoskeletal:      Cervical back: Normal range of motion and neck supple.   Lymphadenopathy:      Cervical: No cervical adenopathy.   Skin:     General: Skin is warm and dry.   Neurological:      Mental Status: She is alert.   Psychiatric:         Behavior: Behavior normal.

## 2025-05-08 NOTE — ASSESSMENT & PLAN NOTE
[facility]ae Neuro referred to ENT ,s he will make appt w ENT Ebenezer Kalina at [facility] or possible vertigo

## 2025-05-08 NOTE — ASSESSMENT & PLAN NOTE
Dr Velazquez ordered CT abd pelvis , nausea since JJ    Had Cholecystectomy in past    I sent ZOfran prn    She will make appt w ENT for evaluation of inner ear w ENT Dr Ebenezer Gilman at [facility] [phone]    Has appointment June 20th with gastroenterology NP Per

## 2025-05-08 NOTE — ASSESSMENT & PLAN NOTE
Sore throat w PND, mild cough x 2d. No fever    CLaritin nightly  Increase fluids  Tylenol 3 times a day for 3d    Come in for evaluation if not better

## 2025-05-12 ENCOUNTER — HOSPITAL ENCOUNTER (OUTPATIENT)
Dept: RADIOLOGY | Facility: HOSPITAL | Age: 70
Discharge: HOME | End: 2025-05-12
Attending: OBSTETRICS & GYNECOLOGY
Payer: MEDICARE

## 2025-05-12 DIAGNOSIS — Z85.42 HISTORY OF ENDOMETRIAL CANCER: ICD-10-CM

## 2025-05-12 PROCEDURE — 25500020 PHARM REV CODE 255: Performed by: OBSTETRICS & GYNECOLOGY

## 2025-05-12 PROCEDURE — A9698 NON-RAD CONTRAST MATERIALNOC: HCPCS | Performed by: OBSTETRICS & GYNECOLOGY

## 2025-05-12 PROCEDURE — 74177 CT ABD & PELVIS W/CONTRAST: CPT | Mod: TC

## 2025-05-12 PROCEDURE — 74177 CT ABD & PELVIS W/CONTRAST: CPT | Mod: 26,,, | Performed by: STUDENT IN AN ORGANIZED HEALTH CARE EDUCATION/TRAINING PROGRAM

## 2025-05-12 RX ADMIN — BARIUM SULFATE 450 ML: 20 SUSPENSION ORAL at 07:05

## 2025-05-12 RX ADMIN — IOHEXOL 75 ML: 350 INJECTION, SOLUTION INTRAVENOUS at 07:05

## 2025-05-13 NOTE — PROGRESS NOTES
Discussed CT findings. Lymphocele in pelvis. Abdominal imaging with  incompletely characterized hepatic normality. She will pursue a MRI of the liver to better characterize this.

## 2025-05-14 ENCOUNTER — TELEPHONE (OUTPATIENT)
Dept: GYNECOLOGIC ONCOLOGY | Facility: CLINIC | Age: 70
End: 2025-05-14
Payer: MEDICARE

## 2025-05-14 ENCOUNTER — PATIENT MESSAGE (OUTPATIENT)
Dept: GYNECOLOGIC ONCOLOGY | Facility: CLINIC | Age: 70
End: 2025-05-14
Payer: MEDICARE

## 2025-05-14 DIAGNOSIS — Z85.42 HISTORY OF ENDOMETRIAL CANCER: Primary | ICD-10-CM

## 2025-05-21 DIAGNOSIS — R42 DIZZINESS AND GIDDINESS: Primary | ICD-10-CM

## 2025-05-27 ENCOUNTER — HOSPITAL ENCOUNTER (OUTPATIENT)
Dept: RADIOLOGY | Facility: HOSPITAL | Age: 70
Discharge: HOME | End: 2025-05-27
Attending: OBSTETRICS & GYNECOLOGY
Payer: MEDICARE

## 2025-05-27 DIAGNOSIS — Z85.42 HISTORY OF ENDOMETRIAL CANCER: ICD-10-CM

## 2025-05-27 PROCEDURE — 74183 MRI ABD W/O CNTR FLWD CNTR: CPT | Mod: TC

## 2025-05-27 PROCEDURE — 25500020 PHARM REV CODE 255: Performed by: OBSTETRICS & GYNECOLOGY

## 2025-05-27 PROCEDURE — A9585 GADOBUTROL INJECTION: HCPCS | Performed by: OBSTETRICS & GYNECOLOGY

## 2025-05-27 RX ORDER — GADOBUTROL 604.72 MG/ML
10 INJECTION INTRAVENOUS
Status: COMPLETED | OUTPATIENT
Start: 2025-05-27 | End: 2025-05-27

## 2025-05-27 RX ADMIN — GADOBUTROL 10 ML: 604.72 INJECTION INTRAVENOUS at 08:05

## 2025-05-28 ENCOUNTER — TELEPHONE (OUTPATIENT)
Dept: GYNECOLOGIC ONCOLOGY | Facility: CLINIC | Age: 70
End: 2025-05-28
Payer: MEDICARE

## 2025-05-28 ENCOUNTER — RESULTS FOLLOW-UP (OUTPATIENT)
Dept: GYNECOLOGIC ONCOLOGY | Facility: CLINIC | Age: 70
End: 2025-05-28

## 2025-05-28 NOTE — TELEPHONE ENCOUNTER
"Verified Name/. Discussed her MRI results per Dr. Velazquez. She had a questions regarding her billary reading. "Gallbladder normal. It must have grown a new one. I don't have one. We got a kick out that." "Please tell Dr. Velazquez. Thank you for your thoroughness."No other questions asked. VU.   "

## 2025-05-28 NOTE — TELEPHONE ENCOUNTER
----- Message from Jodie Velazquez MD sent at 5/28/2025 10:45 AM CDT -----  Regarding: FW:  Please let her know good news! There was nothing concerning on the MRI and we will continue routine surveillance as scheduled     Thanks   Jodie  ----- Message -----  From: Interface, Rad Results In  Sent: 5/28/2025   9:42 AM CDT  To: Jodie Velazquez MD

## 2025-06-02 ENCOUNTER — HOSPITAL ENCOUNTER (OUTPATIENT)
Dept: RADIOLOGY | Facility: HOSPITAL | Age: 70
Discharge: HOME | End: 2025-06-02
Attending: FAMILY MEDICINE
Payer: MEDICARE

## 2025-06-02 DIAGNOSIS — Z12.31 OTHER SCREENING MAMMOGRAM: ICD-10-CM

## 2025-06-02 PROCEDURE — 77067 SCR MAMMO BI INCL CAD: CPT | Mod: 26,,, | Performed by: RADIOLOGY

## 2025-06-02 PROCEDURE — 77067 SCR MAMMO BI INCL CAD: CPT | Mod: TC

## 2025-06-02 PROCEDURE — 77063 BREAST TOMOSYNTHESIS BI: CPT | Mod: 26,,, | Performed by: RADIOLOGY

## 2025-06-18 ENCOUNTER — TELEPHONE (OUTPATIENT)
Dept: ADMINISTRATIVE | Facility: OTHER | Age: 70
End: 2025-06-18
Payer: MEDICARE

## 2025-06-18 ENCOUNTER — TELEPHONE (OUTPATIENT)
Dept: GASTROENTEROLOGY | Facility: CLINIC | Age: 70
End: 2025-06-18
Payer: MEDICARE

## 2025-06-18 NOTE — TELEPHONE ENCOUNTER
Copied from CRM #[id number]. Topic: Appointments - Appointment Confirmation  >> Jun 18, 2025  2:46 PM Shannan wrote:  Type: Confirm appt     Who Called: Ms Grace   Would the patient rather a call back or a response via [facility]? Call if needed   Best Call Back Number: [phone]  Additional Information: She states that Ms Reveles was calling to confirm her appt for June 20 she will be there call if she need more information she states please call her on [phone]

## 2025-06-18 NOTE — TELEPHONE ENCOUNTER
Copied from CRM #[id number]. Topic: General Inquiry - Return Call  >> Jun 18, 2025 11:20 AM Joycelyn wrote:  Returning a Missed Call        Caller:Caitlyn Grace           Returning call to:Yadira           Caller can be reached @:[phone] (home)         Nature of the call:  Missed Call

## 2025-06-19 NOTE — PROGRESS NOTES
Gastroenterology Clinic Consultation Note    Patient ID: Caitlyn Grace is a 69 y.o. female.    Chief Complaint: Abdominal Pain    History of Present Illness    CHIEF COMPLAINT:  Patient presents today for evaluation of abdominal pain and nausea.    HISTORY OF PRESENT ILLNESS:  She reports ongoing abdominal pain and nausea since starting Eliquis for pulmonary blood clots in April. Pain is predominantly located under right rib with varying locations. Nausea occurs approximately 2 hours after eating with an associated tingling sensation but no vomiting. Symptoms began three weeks after initiating Eliquis. Ultrasound and CT of sinuses were unremarkable. She also reports headache and head pressure since July, evaluated by ENT and neurologist without definitive diagnosis.    GASTROINTESTINAL:  She experiences 2-3 bowel movements daily, a change from daily prior to appendix and gallbladder removal. Initially had loose stools and occasional diarrhea, which improved after following a gut protocol. She has a small internal hemorrhoid with history of occasional blood in stool during periods of straining, though denies current blood in stool. She has increased water intake and denies pain with bowel movements. She has history of ulcer 20 years ago attributed to NSAID use. She also has a bone spur at the cryopharyngeus region currently pushing on the esophagus.    MEDICAL HISTORY:  History of localized cancer that was surgically removed without recurrence. History of pulmonary blood clots diagnosed in April requiring anticoagulation.    SURGICAL HISTORY:  Laparoscopic hysterectomy in February of previous year with prolonged post-operative pain. Combined cholecystectomy and appendectomy in 2017 due to gallbladder sand/sludge and abnormal appearing appendix found during colonoscopy.    MEDICATIONS:  Previously on Eliquis until January. Recently completed 26-27 days of Pepcid Complete but discontinued due to stomach cramps.  Previous trial of Prilosec caused stomach cramps. Currently taking Zofran as needed, up to twice daily, with awareness of potential constipation side effect.      ROS:  General: -fever, -chills, -fatigue, -weight gain, -weight loss  Eyes: -vision changes, -redness, -discharge  ENT: -ear pain, -nasal congestion, +sore throat, +sinus pressure  Cardiovascular: -chest pain, -palpitations, -lower extremity edema  Respiratory: -cough, -shortness of breath  Gastrointestinal: +abdominal pain, +nausea, -vomiting, -diarrhea, -constipation, -blood in stool, +change in bowel habits, +difficulty swallowing  Genitourinary: -dysuria, -hematuria, -frequency  Musculoskeletal: -joint pain, -muscle pain  Skin: -rash, -lesion  Neurological: +headache, +dizziness, -numbness, -tingling, +head pain, +vertigo  Psychiatric: -anxiety, -depression, -sleep difficulty         Physical Exam    General: No acute distress. Well-developed. Well-nourished.  Eyes: EOMI. Sclerae anicteric.  HENT: Normocephalic. Atraumatic. Nares patent. Moist oral mucosa.  Ears: Bilateral TMs clear. Bilateral EACs clear.  Cardiovascular: Regular rate. Regular rhythm. No murmurs. No rubs. No gallops. Normal S1, S2.  Respiratory: Normal respiratory effort. Clear to auscultation bilaterally. No rales. No rhonchi. No wheezing.  Abdomen: Soft. Non-tender. Non-distended. Normoactive bowel sounds. Tenderness to palpation around surgical incision.  Musculoskeletal: No  obvious deformity.  Extremities: No lower extremity edema.  Neurological: Alert & oriented x3. No slurred speech. Normal gait.  Psychiatric: Normal mood. Normal affect. Good insight. Good judgment.  Skin: Warm. Dry. No rash.         Medical History:  has a past medical history of Abnormal ECG (05/02/2016), Allergic rhinitis, Bilateral hand pain (04/05/2022), Bunion of right foot (04/25/2019), Chronic gastric ulcer with hemorrhage (04/05/2022), Chronic left shoulder pain (04/18/2018), Endometrial cancer, Fever  blister (04/18/2018), Gallbladder sludge (04/2014), Gastric polyps, Hormone replacement therapy (04/05/2022), Postmenopausal vaginal bleeding (04/18/2018), Rosacea (04/18/2018), Seasonal allergic rhinitis due to pollen (04/25/2019), Seronegative spondyloarthropathy (05/02/2016), and Subclinical iodine-deficiency hypothyroidism (04/05/2022).    Surgical History:  has a past surgical history that includes Salivary gland surgery; Cholecystectomy; Appendectomy; Surgical removal of bone spur; Robot-assisted laparoscopic abdominal hysterectomy using da Som Xi (N/A, 02/28/2024); Robot-assisted laparoscopic salpingo-oophorectomy using da Som Xi (Bilateral, 02/28/2024); mapping, lymph node, sentinel (Bilateral, 02/28/2024); Lymph node biopsy (Bilateral, 02/28/2024); Cystoscopy (02/28/2024); and Hysterectomy.    Family History: family history includes Alzheimer's disease (age of onset: 83) in her mother; Atrial fibrillation in her mother; Cancer in her father; Cancer (age of onset: 80) in her mother; Diabetes in her father; Fibromyalgia in her daughter; Heart failure in her father; Hypertension in her mother..       Review of patient's allergies indicates:   Allergen Reactions    Clindamycin Rash    Doxycycline     Nsaids (non-steroidal anti-inflammatory drug) Other (See Comments)     Gastric ulcer-bleeding    Oxycodone Nausea And Vomiting    Percocet [oxycodone-acetaminophen] Nausea And Vomiting     Can take tylenol    Sulfa (sulfonamide antibiotics) Rash       Medications Ordered Prior to Encounter[1]    Labs:  Lab Results   Component Value Date    WBC 6.50 02/26/2025    HGB 13.8 02/26/2025    HCT 43.4 02/26/2025     02/26/2025    CHOL 210 (H) 03/29/2022    TRIG 105 03/29/2022    HDL 51 03/29/2022    ALKPHOS 99 06/11/2024    ALT 18 06/11/2024    AST 16 06/11/2024     06/11/2024    K 3.9 06/11/2024     06/11/2024    CREATININE 0.8 05/12/2025    BUN 11 06/11/2024    CO2 25 06/11/2024    TSH <0.010 (L)  "05/24/2024       Vital Signs:  /84 (BP Location: Left arm, Patient Position: Sitting)   Pulse 73   Ht 5' 5" (1.651 m)   LMP 02/10/2014   BMI 23.11 kg/m²   Body mass index is 23.11 kg/m².    Imaging reviewed: MRCP 5/27/2025  Impression:     Subcentimeter non-masslike blush of arterial hyperenhancement in the left hepatic lobe corresponding to the finding characterized on CT 05/12/2025, likely a transient hepatic intensity difference with no suspicious imaging features.  No suspicious hepatic lesions.     Nonenhancing, thin walled 2.0 cm cystic focus overlying the right common iliac artery, likely a benign lymphocele or inclusion cyst.     Electronically signed by resident: Kenji Salas  Date:                                            05/28/2025  Time:                                           09:07    CT A/P 5/12/2025  Impression:     Reported history of endometrial malignancy status post hysterectomy.  Grossly unremarkable appearance of the vaginal cuff.     New retroperitoneal cystic structure, in the right common iliac region, benign lymphocele favored given surgical history.  Cystic metastasis is felt less likely, however attention on follow-up imaging is recommended.     Small focus of hyperenhancement in the left hepatic lobe, possibly representing transient perfusional hepatic differences, however incompletely evaluated.  Attention on follow-up imaging versus further evaluation with dedicated CT or MR liver protocol.     Additional findings as detailed in the body of the report.     Electronically signed by resident: Roman Downs  Date:                                            05/13/2025  Time:                                           14:24    US Abdomen 6/20/2024  Impression:     No significant sonographic abnormality.     Electronically signed by resident: Dionisio Perez  Date:                                            06/20/2024  Time:                                           " 09:16    Endoscopy reviewed: Colonoscopy 6/6/2023  -Normal colon  -Repeat colonoscopy in 10 years       Assessment:  1. History of endometrial cancer    2. Nausea    3. Chronic RUQ pain      Orders Placed This Encounter    H. pylori antigen, stool    famotidine (PEPCID) 40 MG tablet       Assessment & Plan      GENERALIZED ABDOMINAL PAIN:   Abdominal pain potentially related to prior hysterectomy, possibly due to visceral hypersensitivity or scar tissue.   Chronic nausea and abdominal pain, with normal imaging results to date.   Considered functional abdominal pain if all tests are normal.   Explained potential causes of abdominal pain post-op, including visceral hypersensitivity and scar tissue.   Educated on the layers of abdominal anatomy (muscle, fascia, fat, skin) in relation to organ pain.   Discussed potential link between constipation and upper abdominal pain.   Started Pepcid 40 mg as needed for abdominal pain.    OTHER GASTRITIS WITHOUT BLEEDING:   Possible mild gastric inflammation as cause of symptoms.   Plan upper endoscopy to directly visualize esophagus, stomach, and small intestine for any abnormalities not detected on imaging, including evaluation of reported bone spur pushing on esophagus.   Discussed how Pepcid works as an H2 blocker and its potential for tachyphylaxis with prolonged use.   Explained how proton pump inhibitors work to reduce stomach acid production.   Ordered upper endoscopy.   Ordered H. pylori stool test to check for infection as potential cause of symptoms.   Contact the office if H. pylori test is positive for antibiotic instructions.    NAUSEA:   Continued Zofran as needed for nausea, with caution about potential constipation.    CONSTIPATION MANAGEMENT:   Patient to increase fiber intake to prevent constipation.   Patient to increase water intake.    ACQUIRED ABSENCE OF UTERUS:   Abdominal pain potentially related to prior hysterectomy, possibly due to visceral  hypersensitivity or scar tissue.    FOLLOW-UP:   Follow up after endoscopy results to discuss findings and further management if needed.           No follow-ups on file.        LAURENCE MANNING  Gastroenterology Department  [facility] [phone]      This note was generated with the assistance of ambient listening technology. Verbal consent was obtained by the patient and accompanying visitor(s) for the recording of patient appointment to facilitate this note. I attest to having reviewed and edited the generated note for accuracy, though some syntax or spelling errors may persist. Please contact the author of this note for any clarification.                       [1]   Current Outpatient Medications on File Prior to Visit   Medication Sig Dispense Refill    beta-carotene,A,-vits C,E/mins (OCUVITE ORAL) Take by mouth.      budesonide (PULMICORT) 0.5 mg/2 mL nebulizer solution SMARTSI Packet(s) Both Nares Twice Daily      Lactobacillus acidophilus (PROBIOTIC ORAL) Take 12.5 Billion Cells by mouth Daily. 1 capsule a day.      methocarbamoL (ROBAXIN) 750 MG Tab Take 750 mg by mouth 3 (three) times daily.      MULTIVITAMIN ORAL Take 1 tablet by mouth 2 (two) times daily.      NP THYROID 120 mg Tab Take 1 tablet by mouth every morning. Patient states she takes 90 mg.      valACYclovir (VALTREX) 1000 MG tablet 1 po bid x 2 d at onset of fever blister 12 tablet 3    vit B6-L. [id number]-ALA (NUFOLA) 25 mg-3,500 mcg DFE-1 mg-300 mg Cap Take 1 tablet by mouth once daily.      VITAMIN D2-VITAMIN K1 ORAL Take 1 tablet by mouth once daily.      UNABLE TO FIND once daily. lumity      UNABLE TO FIND once daily. dosokap      UNABLE TO FIND once daily. omaprem      UNABLE TO FIND once daily. adrecor       No current facility-administered medications on file prior to visit.

## 2025-06-20 ENCOUNTER — OFFICE VISIT (OUTPATIENT)
Dept: GASTROENTEROLOGY | Facility: CLINIC | Age: 70
End: 2025-06-20
Payer: MEDICARE

## 2025-06-20 ENCOUNTER — TELEPHONE (OUTPATIENT)
Dept: ENDOSCOPY | Facility: HOSPITAL | Age: 70
End: 2025-06-20
Payer: MEDICARE

## 2025-06-20 VITALS
DIASTOLIC BLOOD PRESSURE: 84 MMHG | BODY MASS INDEX: 23.11 KG/M2 | SYSTOLIC BLOOD PRESSURE: 126 MMHG | HEIGHT: 65 IN | HEART RATE: 73 BPM

## 2025-06-20 DIAGNOSIS — K21.9 GASTROESOPHAGEAL REFLUX DISEASE, UNSPECIFIED WHETHER ESOPHAGITIS PRESENT: ICD-10-CM

## 2025-06-20 DIAGNOSIS — R10.9 ABDOMINAL PAIN, UNSPECIFIED ABDOMINAL LOCATION: Primary | ICD-10-CM

## 2025-06-20 DIAGNOSIS — G89.29 CHRONIC RUQ PAIN: Primary | ICD-10-CM

## 2025-06-20 DIAGNOSIS — R11.0 NAUSEA: ICD-10-CM

## 2025-06-20 DIAGNOSIS — Z85.42 HISTORY OF ENDOMETRIAL CANCER: ICD-10-CM

## 2025-06-20 DIAGNOSIS — R10.11 CHRONIC RUQ PAIN: Primary | ICD-10-CM

## 2025-06-20 PROCEDURE — 99999 PR PBB SHADOW E&M-EST. PATIENT-LVL IV: CPT | Mod: PBBFAC,,,

## 2025-06-20 PROCEDURE — 99214 OFFICE O/P EST MOD 30 MIN: CPT | Mod: PBBFAC

## 2025-06-20 RX ORDER — FAMOTIDINE 40 MG/1
40 TABLET, FILM COATED ORAL DAILY
Qty: 30 TABLET | Refills: 11 | Status: SHIPPED | OUTPATIENT
Start: 2025-06-20 | End: 2026-06-20

## 2025-06-20 NOTE — TELEPHONE ENCOUNTER
"----- Message from Ashly Albarado NP sent at 6/20/2025  2:38 PM CDT -----  Regarding: EGD  Procedure: EGD    Diagnosis: Abdominal pain    Procedure Timing: Within 12 weeks    #If within 4 weeks selected, please joana as high priority#    #If greater than 12 weeks, please select "5-12 weeks" and delay sending until 3 months prior to requested date#     Location: Any Site    Additional Scheduling Information: No scheduling concerns    Prep Specifications:N/A    Is the patient taking a GLP-1 Agonist:no    Have you attached a patient to this message: yes  "

## 2025-06-20 NOTE — TELEPHONE ENCOUNTER
Patient is scheduled for a Upper Endoscopy (EGD) on 7/24/25 with Dr. KEY Celestin  Referral for procedure from Clinic visit.

## 2025-06-25 ENCOUNTER — LAB VISIT (OUTPATIENT)
Dept: LAB | Facility: HOSPITAL | Age: 70
End: 2025-06-25
Payer: MEDICARE

## 2025-06-25 DIAGNOSIS — R11.0 NAUSEA: ICD-10-CM

## 2025-06-25 DIAGNOSIS — G89.29 CHRONIC RUQ PAIN: ICD-10-CM

## 2025-06-25 DIAGNOSIS — R10.11 CHRONIC RUQ PAIN: ICD-10-CM

## 2025-06-25 PROCEDURE — 87338 HPYLORI STOOL AG IA: CPT

## 2025-06-26 ENCOUNTER — RESULTS FOLLOW-UP (OUTPATIENT)
Dept: GASTROENTEROLOGY | Facility: CLINIC | Age: 70
End: 2025-06-26

## 2025-06-26 LAB
H. PYLORI SURFACE ANTIGEN, INTERPRETATION (OHS): NEGATIVE
HELICOBACTER PYLORI SURFACE ANTIGEN (OHS): 0.15

## 2025-07-16 ENCOUNTER — TELEPHONE (OUTPATIENT)
Dept: PRIMARY CARE CLINIC | Facility: CLINIC | Age: 70
End: 2025-07-16
Payer: MEDICARE

## 2025-07-16 DIAGNOSIS — Z01.810 PREOP CARDIOVASCULAR EXAM: Primary | ICD-10-CM

## 2025-07-24 ENCOUNTER — ANESTHESIA (OUTPATIENT)
Dept: ENDOSCOPY | Facility: HOSPITAL | Age: 70
End: 2025-07-24
Payer: MEDICARE

## 2025-07-24 ENCOUNTER — HOSPITAL ENCOUNTER (OUTPATIENT)
Facility: HOSPITAL | Age: 70
Discharge: HOME | End: 2025-07-24
Attending: STUDENT IN AN ORGANIZED HEALTH CARE EDUCATION/TRAINING PROGRAM | Admitting: STUDENT IN AN ORGANIZED HEALTH CARE EDUCATION/TRAINING PROGRAM
Payer: MEDICARE

## 2025-07-24 ENCOUNTER — ANESTHESIA EVENT (OUTPATIENT)
Dept: ENDOSCOPY | Facility: HOSPITAL | Age: 70
End: 2025-07-24
Payer: MEDICARE

## 2025-07-24 VITALS
HEART RATE: 67 BPM | SYSTOLIC BLOOD PRESSURE: 157 MMHG | OXYGEN SATURATION: 99 % | DIASTOLIC BLOOD PRESSURE: 74 MMHG | RESPIRATION RATE: 16 BRPM | HEIGHT: 65 IN | TEMPERATURE: 98 F | BODY MASS INDEX: 22.96 KG/M2 | WEIGHT: 137.81 LBS

## 2025-07-24 DIAGNOSIS — K21.9 GASTROESOPHAGEAL REFLUX DISEASE, UNSPECIFIED WHETHER ESOPHAGITIS PRESENT: ICD-10-CM

## 2025-07-24 DIAGNOSIS — R10.9 ABDOMINAL PAIN, UNSPECIFIED ABDOMINAL LOCATION: ICD-10-CM

## 2025-07-24 DIAGNOSIS — R10.9 ABDOMINAL PAIN: ICD-10-CM

## 2025-07-24 PROCEDURE — 63600175 PHARM REV CODE 636 W HCPCS: Performed by: NURSE ANESTHETIST, CERTIFIED REGISTERED

## 2025-07-24 PROCEDURE — 25000003 PHARM REV CODE 250: Performed by: NURSE ANESTHETIST, CERTIFIED REGISTERED

## 2025-07-24 PROCEDURE — 88305 TISSUE EXAM BY PATHOLOGIST: CPT | Mod: 26,,, | Performed by: PATHOLOGY

## 2025-07-24 PROCEDURE — 37000009 HC ANESTHESIA EA ADD 15 MINS: Performed by: STUDENT IN AN ORGANIZED HEALTH CARE EDUCATION/TRAINING PROGRAM

## 2025-07-24 PROCEDURE — 25000003 PHARM REV CODE 250: Performed by: STUDENT IN AN ORGANIZED HEALTH CARE EDUCATION/TRAINING PROGRAM

## 2025-07-24 PROCEDURE — 37000008 HC ANESTHESIA 1ST 15 MINUTES: Performed by: STUDENT IN AN ORGANIZED HEALTH CARE EDUCATION/TRAINING PROGRAM

## 2025-07-24 PROCEDURE — 27201012 HC FORCEPS, HOT/COLD, DISP: Performed by: STUDENT IN AN ORGANIZED HEALTH CARE EDUCATION/TRAINING PROGRAM

## 2025-07-24 PROCEDURE — 43251 EGD REMOVE LESION SNARE: CPT | Performed by: STUDENT IN AN ORGANIZED HEALTH CARE EDUCATION/TRAINING PROGRAM

## 2025-07-24 PROCEDURE — 88305 TISSUE EXAM BY PATHOLOGIST: CPT | Mod: TC,91 | Performed by: STUDENT IN AN ORGANIZED HEALTH CARE EDUCATION/TRAINING PROGRAM

## 2025-07-24 PROCEDURE — 43239 EGD BIOPSY SINGLE/MULTIPLE: CPT | Mod: XS | Performed by: STUDENT IN AN ORGANIZED HEALTH CARE EDUCATION/TRAINING PROGRAM

## 2025-07-24 PROCEDURE — 27201089 HC SNARE, DISP (ANY): Performed by: STUDENT IN AN ORGANIZED HEALTH CARE EDUCATION/TRAINING PROGRAM

## 2025-07-24 PROCEDURE — 43251 EGD REMOVE LESION SNARE: CPT | Mod: ,,, | Performed by: STUDENT IN AN ORGANIZED HEALTH CARE EDUCATION/TRAINING PROGRAM

## 2025-07-24 PROCEDURE — 43239 EGD BIOPSY SINGLE/MULTIPLE: CPT | Mod: XS,,, | Performed by: STUDENT IN AN ORGANIZED HEALTH CARE EDUCATION/TRAINING PROGRAM

## 2025-07-24 RX ORDER — ONDANSETRON 4 MG/1
4 TABLET, FILM COATED ORAL EVERY 8 HOURS PRN
COMMUNITY

## 2025-07-24 RX ORDER — LIDOCAINE HYDROCHLORIDE 20 MG/ML
INJECTION, SOLUTION EPIDURAL; INFILTRATION; INTRACAUDAL; PERINEURAL
Status: DISCONTINUED | OUTPATIENT
Start: 2025-07-24 | End: 2025-07-24

## 2025-07-24 RX ORDER — PROPOFOL 10 MG/ML
VIAL (ML) INTRAVENOUS
Status: DISCONTINUED | OUTPATIENT
Start: 2025-07-24 | End: 2025-07-24

## 2025-07-24 RX ORDER — SODIUM CHLORIDE 9 MG/ML
INJECTION, SOLUTION INTRAVENOUS CONTINUOUS
Status: DISCONTINUED | OUTPATIENT
Start: 2025-07-24 | End: 2025-07-24 | Stop reason: HOSPADM

## 2025-07-24 RX ORDER — ETOMIDATE 2 MG/ML
INJECTION INTRAVENOUS
Status: DISCONTINUED | OUTPATIENT
Start: 2025-07-24 | End: 2025-07-24

## 2025-07-24 RX ADMIN — SODIUM CHLORIDE: 0.9 INJECTION, SOLUTION INTRAVENOUS at 12:07

## 2025-07-24 RX ADMIN — ETOMIDATE 8 MG: 2 INJECTION INTRAVENOUS at 12:07

## 2025-07-24 RX ADMIN — PROPOFOL 125 MCG/KG/MIN: 10 INJECTION, EMULSION INTRAVENOUS at 12:07

## 2025-07-24 RX ADMIN — LIDOCAINE HYDROCHLORIDE 60 MG: 20 INJECTION, SOLUTION EPIDURAL; INFILTRATION; INTRACAUDAL; PERINEURAL at 12:07

## 2025-07-24 RX ADMIN — PROPOFOL 80 MG: 10 INJECTION, EMULSION INTRAVENOUS at 12:07

## 2025-07-24 NOTE — ANESTHESIA POSTPROCEDURE EVALUATION
Anesthesia Post Evaluation    Patient: Caitlyn Grace    Procedure(s) Performed: Procedure(s) (LRB):  EGD (ESOPHAGOGASTRODUODENOSCOPY) (N/A)    Final Anesthesia Type: general      Patient location during evaluation: GI PACU  Patient participation: Yes- Able to Participate  Level of consciousness: awake and alert and oriented  Post-procedure vital signs: reviewed and stable  Pain management: adequate  Airway patency: patent    PONV status at discharge: No PONV  Anesthetic complications: no      Cardiovascular status: hemodynamically stable  Respiratory status: unassisted  Hydration status: euvolemic  Follow-up not needed.              Vitals Value Taken Time   /65 07/24/25 12:47   Temp 36.6 °C (97.9 °F) 07/24/25 12:36   Pulse 74 07/24/25 12:47   Resp 18 07/24/25 12:47   SpO2 100 % 07/24/25 12:47         No case tracking events are documented in the log.      Pain/Maria Dolores Score: Maria Dolores Score: 10 (7/24/2025 12:47 PM)

## 2025-07-24 NOTE — TRANSFER OF CARE
"Anesthesia Transfer of Care Note    Patient: Caitlyn Grace    Procedure(s) Performed: Procedure(s) (LRB):  EGD (ESOPHAGOGASTRODUODENOSCOPY) (N/A)    Patient location: GI    Anesthesia Type: general    Transport from OR: Transported from OR on 2-3 L/min O2 by NC with adequate spontaneous ventilation    Post pain: adequate analgesia    Post assessment: no apparent anesthetic complications and tolerated procedure well    Post vital signs: stable    Level of consciousness: awake, alert and oriented    Nausea/Vomiting: no nausea/vomiting    Complications: none    Transfer of care protocol was followed      Last vitals: Visit Vitals  /76   Pulse 79   Temp 36.6 °C (97.9 °F)   Resp 18   Ht 5' 5" (1.651 m)   Wt 62.5 kg (137 lb 12.6 oz)   LMP 02/10/2014   SpO2 99%   Breastfeeding No   BMI 22.93 kg/m²     "

## 2025-07-24 NOTE — H&P
Short Stay Endoscopy History and Physical    PCP - Kristan Shahid MD     Procedure - EGD  ASA - per anesthesia  Mallampati - per anesthesia  History of Anesthesia problems - no  Family history Anesthesia problems -  no   Plan of anesthesia - General    HPI:  This is a 69 y.o. female here for evaluation of :     Abdominal pain, nausea. Reports being told she had potential cricopharyngeal bar on xray but denies any dysphagia.      Medical History:  has a past medical history of Abnormal ECG (05/02/2016), Allergic rhinitis, Bilateral hand pain (04/05/2022), Bunion of right foot (04/25/2019), Chronic gastric ulcer with hemorrhage (04/05/2022), Chronic left shoulder pain (04/18/2018), Endometrial cancer, Fever blister (04/18/2018), Gallbladder sludge (04/2014), Gastric polyps, Hormone replacement therapy (04/05/2022), Postmenopausal vaginal bleeding (04/18/2018), Rosacea (04/18/2018), Seasonal allergic rhinitis due to pollen (04/25/2019), Seronegative spondyloarthropathy (05/02/2016), and Subclinical iodine-deficiency hypothyroidism (04/05/2022).    Surgical History:  has a past surgical history that includes Salivary gland surgery; Cholecystectomy; Appendectomy; Surgical removal of bone spur; Robot-assisted laparoscopic abdominal hysterectomy using da Som Xi (N/A, 02/28/2024); Robot-assisted laparoscopic salpingo-oophorectomy using da Som Xi (Bilateral, 02/28/2024); mapping, lymph node, sentinel (Bilateral, 02/28/2024); Lymph node biopsy (Bilateral, 02/28/2024); Cystoscopy (02/28/2024); and Hysterectomy.    Family History: family history includes Alzheimer's disease (age of onset: 83) in her mother; Atrial fibrillation in her mother; Cancer in her father; Cancer (age of onset: 80) in her mother; Diabetes in her father; Fibromyalgia in her daughter; Heart failure in her father; Hypertension in her mother.    Social History:  reports that she has never smoked. She has never used smokeless tobacco. She reports  that she does not currently use alcohol. She reports that she does not use drugs.    Review of patient's allergies indicates:   Allergen Reactions    Clindamycin Rash    Doxycycline     Nsaids (non-steroidal anti-inflammatory drug) Other (See Comments)     Gastric ulcer-bleeding    Oxycodone Nausea And Vomiting    Percocet [oxycodone-acetaminophen] Nausea And Vomiting     Can take tylenol    Sulfa (sulfonamide antibiotics) Rash       Medications:   Prescriptions Prior to Admission[1]    Physical Exam:    Vital Signs:   Vitals:    07/24/25 1101   BP: (!) 161/88   Pulse: 81   Resp: 16   Temp: 97.7 °F (36.5 °C)       General Appearance: Well appearing in no acute distress  Head: Normocephalic, without obvious abnormality   Lungs: Non-labored breathing  Abdomen: Soft, non tender, non distended     Labs:  Lab Results   Component Value Date    WBC 6.50 02/26/2025    HGB 13.8 02/26/2025    HCT 43.4 02/26/2025     02/26/2025    CHOL 210 (H) 03/29/2022    TRIG 105 03/29/2022    HDL 51 03/29/2022    ALT 18 06/11/2024    AST 16 06/11/2024     06/11/2024    K 3.9 06/11/2024     06/11/2024    CREATININE 0.8 05/12/2025    BUN 11 06/11/2024    CO2 25 06/11/2024    TSH <0.010 (L) 05/24/2024       I have explained the risks and benefits of endoscopy procedures to the patient including but not limited to bleeding, perforation, infection, and death.  The patient was asked if they understand and allowed to ask any further questions to their satisfaction.      Xavier Celestin MD        [1]   Medications Prior to Admission   Medication Sig Dispense Refill Last Dose/Taking    famotidine (PEPCID) 40 MG tablet Take 1 tablet (40 mg total) by mouth once daily. 30 tablet 11 Past Month    NP THYROID 120 mg Tab Take 1 tablet by mouth every morning. Patient states she takes 90 mg.   7/24/2025    ondansetron (ZOFRAN) 4 MG tablet Take 4 mg by mouth every 8 (eight) hours as needed for Nausea.   Past Week    beta-carotene,A,-vits  C,E/mins (OCUVITE ORAL) Take by mouth.       budesonide (PULMICORT) 0.5 mg/2 mL nebulizer solution SMARTSI Packet(s) Both Nares Twice Daily   More than a month    Lactobacillus acidophilus (PROBIOTIC ORAL) Take 12.5 Billion Cells by mouth Daily. 1 capsule a day.       methocarbamoL (ROBAXIN) 750 MG Tab Take 750 mg by mouth 3 (three) times daily.   Unknown    MULTIVITAMIN ORAL Take 1 tablet by mouth 2 (two) times daily.       UNABLE TO FIND once daily. lumity       UNABLE TO FIND once daily. dosokap       UNABLE TO FIND once daily. omaprem       UNABLE TO FIND once daily. adrecor       valACYclovir (VALTREX) 1000 MG tablet 1 po bid x 2 d at onset of fever blister 12 tablet 3     vit B6-L. [id number]-we-H56-ALA (NUFOLA) 25 mg-3,500 mcg DFE-1 mg-300 mg Cap Take 1 tablet by mouth once daily.       VITAMIN D2-VITAMIN K1 ORAL Take 1 tablet by mouth once daily.

## 2025-07-24 NOTE — [employer] PATIENT INSTRUCTIONS
Discharge Summary/Instructions after an Endoscopic Procedure  Patient Name: Caitlyn Grace  Patient MRN: [id number]  Patient YOB: 1955 Thursday, July 24, 2025  Xavier Celestin MD  Dear patient,  As a result of recent federal legislation (The Federal Cures Act), you may   receive lab or pathology results from your procedure in your [facility]   account before your physician is able to contact you. Your physician or   their representative will relay the results to you with their   recommendations at their soonest availability.  Thank you,  RESTRICTIONS:  During your procedure today, you received medications for sedation.  These   medications may affect your judgment, balance and coordination.  Therefore,   for 24 hours, you have the following restrictions:   - DO NOT drive a car, operate machinery, make legal/financial decisions,   sign important papers or drink alcohol.    ACTIVITY:  Today: no heavy lifting, straining or running due to procedural   sedation/anesthesia.  The following day: return to full activity including work.  DIET:  Eat and drink normally unless instructed otherwise.     TREATMENT FOR COMMON SIDE EFFECTS:  - Mild abdominal pain, nausea, belching, bloating or excessive gas:  rest,   eat lightly and use a heating pad.  - Sore Throat: treat with throat lozenges and/or gargle with warm salt   water.  - Because air was used during the procedure, expelling large amounts of air   from your rectum or belching is normal.  - If a bowel prep was taken, you may not have a bowel movement for 1-3 days.    This is normal.  SYMPTOMS TO WATCH FOR AND REPORT TO YOUR PHYSICIAN:  1. Abdominal pain or bloating, other than gas cramps.  2. Chest pain.  3. Back pain.  4. Signs of infection such as: chills or fever occurring within 24 hours   after the procedure.  5. Rectal bleeding, which would show as bright red, maroon, or black stools.   (A tablespoon of blood from the rectum is not serious, especially if    hemorrhoids are present.)  6. Vomiting.  7. Weakness or dizziness.  GO DIRECTLY TO THE NEAREST EMERGENCY ROOM IF YOU HAVE ANY OF THE FOLLOWING:      Difficulty breathing              Chills and/or fever over 101 F   Persistent vomiting and/or vomiting blood   Severe abdominal pain   Severe chest pain   Black, tarry stools   Bleeding- more than one tablespoon   Any other symptom or condition that you feel may need urgent attention  Your doctor recommends these additional instructions:  If any biopsies were taken, your doctors clinic will contact you in 1 to 2   weeks with any results.  - Discharge patient to home (with escort).   - Await pathology results.   - Patient has a contact number available for emergencies.  The signs and   symptoms of potential delayed complications were discussed with the   patient.  Return to normal activities tomorrow.  Written discharge   instructions were provided to the patient.  For questions, problems or results please call your physician - Xavier Celestin MD at Work:  [phone].  [facility], EMERGENCY ROOM PHONE NUMBER: [phone]  IF A COMPLICATION OR EMERGENCY SITUATION ARISES AND YOU ARE UNABLE TO REACH   YOUR PHYSICIAN - GO DIRECTLY TO THE EMERGENCY ROOM.  MD Xavier Ott MD  7/24/2025 12:33:51 PM  This report has been verified and signed electronically.  Dear patient,  As a result of recent federal legislation (The Federal Cures Act), you may   receive lab or pathology results from your procedure in your [facility]   account before your physician is able to contact you. Your physician or   their representative will relay the results to you with their   recommendations at their soonest availability.  Thank you,  [employer]ATION

## 2025-07-24 NOTE — ANESTHESIA PREPROCEDURE EVALUATION
07/24/2025  Caitlyn Grace is a 69 y.o., female.      Pre-op Assessment    I have reviewed the Patient Summary Reports.     I have reviewed the Nursing Notes. I have reviewed the NPO Status.   I have reviewed the Medications.     Review of Systems  Anesthesia Hx:  No problems with previous Anesthesia   Neg history of prior surgery.          Denies Family Hx of Anesthesia complications.    Denies Personal Hx of Anesthesia complications.                    Hematology/Oncology:  Hematology Normal   Oncology Normal                                   EENT/Dental:  EENT/Dental Normal           Cardiovascular:  Cardiovascular Normal                                              Pulmonary:  Pulmonary Normal                       Renal/:  Renal/ Normal                 Hepatic/GI:  Hepatic/GI Normal                    Musculoskeletal:  Musculoskeletal Normal                Neurological:  Neurology Normal                                      Endocrine:  Endocrine Normal            Dermatological:  Skin Normal    Psych:  Psychiatric Normal                    Physical Exam  General: Well nourished    Airway:  Mallampati: II / II  TM Distance: Normal  Tongue: Normal    Dental:  Intact        Anesthesia Plan  Type of Anesthesia, risks & benefits discussed:    Anesthesia Type: MAC  Intra-op Monitoring Plan: Standard ASA Monitors  Induction:  IV  Informed Consent: Informed consent signed with the Patient and all parties understand the risks and agree with anesthesia plan.  All questions answered.   ASA Score: 2    Ready For Surgery From Anesthesia Perspective.     .

## 2025-07-25 ENCOUNTER — NURSE TRIAGE (OUTPATIENT)
Dept: ADMINISTRATIVE | Facility: CLINIC | Age: 70
End: 2025-07-25
Payer: MEDICARE

## 2025-07-25 ENCOUNTER — TELEPHONE (OUTPATIENT)
Dept: GASTROENTEROLOGY | Facility: CLINIC | Age: 70
End: 2025-07-25
Payer: MEDICARE

## 2025-07-25 ENCOUNTER — TELEPHONE (OUTPATIENT)
Dept: GASTROENTEROLOGY | Facility: HOSPITAL | Age: 70
End: 2025-07-25
Payer: MEDICARE

## 2025-07-25 LAB
ESTROGEN SERPL-MCNC: NORMAL PG/ML
INSULIN SERPL-ACNC: NORMAL U[IU]/ML
LAB AP CLINICAL INFORMATION: NORMAL
LAB AP GROSS DESCRIPTION: NORMAL
LAB AP PERFORMING LOCATION(S): NORMAL
LAB AP REPORT FOOTNOTES: NORMAL

## 2025-07-25 NOTE — TELEPHONE ENCOUNTER
Copied from CRM #[id number]. Topic: General Inquiry - Patient Advice  >> Jul 25, 2025  1:53 PM Ronald wrote:  Type:  Needs Medical Advice  Who Called: The pt   Symptoms (please be specific): Dizzy and pt vomit.   How long has patient had these symptoms:  9am today   Pharmacy name and phone #:  .  Would the patient rather a call back or a response via [facility]? Call back  Call Back Number:[phone]

## 2025-07-25 NOTE — TELEPHONE ENCOUNTER
"EGD yesterday. Felt fine yesterday. Woke up during the night, severe vertigo. Vomited today, a few times over the course of a few minutes. Feels very nauseated. States she does have a HX of Vertigo but has never vomited before. Very concerned due to this never happening before. Triage done- dispo Call PCP now.     Secure chat to office Dr. Celestin informed. The office will reach out.   Reason for Disposition   Vomiting after endoscopy, questions about   [1] Caller has URGENT question or concern AND [2] triager unable to answer question    Additional Information   Negative: SEVERE abdomen pain (e.g., excruciating)   Negative: SEVERE dizziness (e.g., unable to stand, requires support to walk, feels like passing out now)   Negative: [1] Vomited blood AND [2] more than a few streaks of blood   (Exception: Few streaks and only occurred once.)   Negative: [1] Vomiting AND [2] contains black ("coffee ground") material   Negative: Black or tarry bowel movements   Negative: Area of skin on neck or upper chest swells or feels crunchy (crackles) when touched   Negative: [1] Pain in upper abdomen lasts > 10 minutes AND [2] age > 35 AND [3] at least one cardiac risk factor (e.g., diabetes, high cholesterol, hypertension, obesity, smoker or strong family history of heart disease)   Negative: [1] Pain in upper abdomen lasts > 10 minutes AND [2] age > 40 AND [3] associated chest, arm, neck, upper back or jaw pain   Negative: [1] Pain in upper abdomen lasts > 10 minutes AND [2] age > 50   Negative: [1] Pain in upper abdomen lasts > 10 minutes AND [2] history of heart disease (i.e., heart attack, bypass surgery, angina, angioplasty, HF; not just a heart murmur)   Negative: [1] Pain in upper abdomen lasts > 10 minutes AND [2] difficulty breathing   Negative: [1] Abdomen pain AND [2] pain radiates to upper back or shoulder   Negative: SEVERE vomiting (e.g., 6 or more times/day)   Negative: SEVERE throat pain (excruciating)   Negative: " SEVERE difficulty swallowing (e.g., drooling or spitting, can't swallow water)   Negative: [1] MILD-MODERATE abdomen pain AND [2] constant AND [3] present > 2 hours   Negative: [1] Drinking very little AND [2] dehydration suspected (e.g., no urine > 12 hours, very dry mouth, very lightheaded)   Negative: Patient sounds very sick or weak to the triager   Negative: Fever > 100.4 F (38.0 C)   Negative: [1] MILD to MODERATE vomiting (e.g., 1 to 5 times a day) AND [2] lasting > 4 hours    Protocols used: Endoscopy (Upper GI) Symptoms and [id number]

## (undated) DEVICE — GLOVE SENSICARE PI SURG 6

## (undated) DEVICE — DEVICE SNAPSECURE FOL CATH

## (undated) DEVICE — SUT MCRYL PLUS 4-0 PS2 27IN

## (undated) DEVICE — GLOVE SENSICARE PI SURG 6.5

## (undated) DEVICE — TOWEL OR DISP STRL BLUE 4/PK

## (undated) DEVICE — TRAY DO THE ROBOT

## (undated) DEVICE — SET TRI-LUMEN FILTERED TUBE

## (undated) DEVICE — KIT WING PAD POSITIONING

## (undated) DEVICE — APPLICATOR ARISTA FLEX XL

## (undated) DEVICE — GLOVE SENSICARE PI MICRO 5.5

## (undated) DEVICE — INSERT CUSHIONPRONE VIEW LARGE

## (undated) DEVICE — NDL INSUFFLATION VERRES 120MM

## (undated) DEVICE — OBTURATOR BLADELESS 8MM XI CLR

## (undated) DEVICE — MARKER SKIN STND TIP BLUE BARR

## (undated) DEVICE — COVER TIP CURVED SCISSORS XI

## (undated) DEVICE — FIBRILLAR ABS HEMOSTAT 4X4

## (undated) DEVICE — GOWN NONREINF SET-IN SLV XL

## (undated) DEVICE — SUT V-LOC ABSRB WOUND CLSR

## (undated) DEVICE — GLOVE SENSICARE PI GRN 7

## (undated) DEVICE — SYR 10CC LUER LOCK

## (undated) DEVICE — SUT VLOC 180 2-0 GS-22 NDL

## (undated) DEVICE — NDL HYPO REG 25G X 1 1/2

## (undated) DEVICE — SOL POVIDONE SCRUB IODINE 4 OZ

## (undated) DEVICE — SOL NORMAL USPCA 0.9%

## (undated) DEVICE — DRAPE ARM DAVINCI XI

## (undated) DEVICE — BAG TISSUE RETRIEVAL 5MM

## (undated) DEVICE — SOL ELECTROLUBE ANTI-STIC

## (undated) DEVICE — POWDER ARISTA AH 3G

## (undated) DEVICE — GLOVE SENSICARE PI GRN 6.5

## (undated) DEVICE — SEAL UNIVERSAL 5MM-8MM XI

## (undated) DEVICE — MANIPULATOR VCARE PLUS 34MM

## (undated) DEVICE — CONTAINER SPEC OR STRL 4.5OZ

## (undated) DEVICE — SOL IRRI STRL WATER 1000ML

## (undated) DEVICE — SOL POVIDONE PREP IODINE 4 OZ

## (undated) DEVICE — GLOVE SENSICARE PI GRN 6

## (undated) DEVICE — ADHESIVE DERMABOND ADVANCED

## (undated) DEVICE — DRAPE COLUMN DAVINCI XI

## (undated) DEVICE — IRRIGATOR ENDOSCOPY DISP.

## (undated) DEVICE — SYS SEE SHARP SCP ANTIFG LNG

## (undated) DEVICE — ELECTRODE REM PLYHSV RETURN 9

## (undated) DEVICE — JELLY SURGILUBE 5GR